# Patient Record
Sex: FEMALE | Race: WHITE | NOT HISPANIC OR LATINO | Employment: OTHER | URBAN - METROPOLITAN AREA
[De-identification: names, ages, dates, MRNs, and addresses within clinical notes are randomized per-mention and may not be internally consistent; named-entity substitution may affect disease eponyms.]

---

## 2017-01-03 ENCOUNTER — HOSPITAL ENCOUNTER (OUTPATIENT)
Dept: PHYSICAL THERAPY | Facility: CLINIC | Age: 57
Setting detail: THERAPIES SERIES
End: 2017-01-03
Attending: PSYCHIATRY & NEUROLOGY
Payer: COMMERCIAL

## 2017-01-03 PROCEDURE — 97110 THERAPEUTIC EXERCISES: CPT | Mod: GP | Performed by: PHYSICAL THERAPIST

## 2017-01-03 PROCEDURE — 40000718 ZZHC STATISTIC PT DEPARTMENT ORTHO VISIT: Performed by: PHYSICAL THERAPIST

## 2017-01-03 PROCEDURE — 97014 ELECTRIC STIMULATION THERAPY: CPT | Mod: GP | Performed by: PHYSICAL THERAPIST

## 2017-01-03 PROCEDURE — 97140 MANUAL THERAPY 1/> REGIONS: CPT | Mod: GP | Performed by: PHYSICAL THERAPIST

## 2017-01-06 ENCOUNTER — TELEPHONE (OUTPATIENT)
Dept: FAMILY MEDICINE | Facility: CLINIC | Age: 57
End: 2017-01-06
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
## 2017-01-06 NOTE — TELEPHONE ENCOUNTER
Discussed @ length being able to remember things and having a hard time saying what she wants to say.  Talked about stress and anxiety.  Problem with incontinence.  Scheduled appt next week to discuss incontinence.  Yanira

## 2017-01-10 ENCOUNTER — HOSPITAL ENCOUNTER (OUTPATIENT)
Dept: PHYSICAL THERAPY | Facility: CLINIC | Age: 57
Setting detail: THERAPIES SERIES
End: 2017-01-10
Attending: PSYCHIATRY & NEUROLOGY
Payer: COMMERCIAL

## 2017-01-10 PROCEDURE — 97110 THERAPEUTIC EXERCISES: CPT | Mod: GP | Performed by: PHYSICAL THERAPIST

## 2017-01-10 PROCEDURE — 40000718 ZZHC STATISTIC PT DEPARTMENT ORTHO VISIT: Performed by: PHYSICAL THERAPIST

## 2017-01-10 PROCEDURE — 97140 MANUAL THERAPY 1/> REGIONS: CPT | Mod: GP | Performed by: PHYSICAL THERAPIST

## 2017-01-17 ENCOUNTER — HOSPITAL ENCOUNTER (OUTPATIENT)
Dept: PHYSICAL THERAPY | Facility: CLINIC | Age: 57
Setting detail: THERAPIES SERIES
End: 2017-01-17
Attending: PSYCHIATRY & NEUROLOGY
Payer: COMMERCIAL

## 2017-01-17 PROCEDURE — 97140 MANUAL THERAPY 1/> REGIONS: CPT | Mod: GP | Performed by: PHYSICAL THERAPIST

## 2017-01-17 PROCEDURE — 40000718 ZZHC STATISTIC PT DEPARTMENT ORTHO VISIT: Performed by: PHYSICAL THERAPIST

## 2017-01-17 PROCEDURE — 97110 THERAPEUTIC EXERCISES: CPT | Mod: GP | Performed by: PHYSICAL THERAPIST

## 2017-01-25 ENCOUNTER — HOSPITAL ENCOUNTER (OUTPATIENT)
Dept: PHYSICAL THERAPY | Facility: CLINIC | Age: 57
Setting detail: THERAPIES SERIES
End: 2017-01-25
Attending: PSYCHIATRY & NEUROLOGY
Payer: COMMERCIAL

## 2017-01-25 ENCOUNTER — TELEPHONE (OUTPATIENT)
Dept: FAMILY MEDICINE | Facility: CLINIC | Age: 57
End: 2017-01-25

## 2017-01-25 PROCEDURE — 40000718 ZZHC STATISTIC PT DEPARTMENT ORTHO VISIT: Performed by: PHYSICAL THERAPIST

## 2017-01-25 PROCEDURE — 97140 MANUAL THERAPY 1/> REGIONS: CPT | Mod: GP | Performed by: PHYSICAL THERAPIST

## 2017-01-25 PROCEDURE — 97110 THERAPEUTIC EXERCISES: CPT | Mod: GP | Performed by: PHYSICAL THERAPIST

## 2017-01-25 NOTE — TELEPHONE ENCOUNTER
Reason for Call:  Form, our goal is to have forms completed with 72 hours, however, some forms may require a visit or additional information.    Type of letter, form or note:  disability    Who is the form from?:  (if other please explain)    Where did the form come from: form was faxed in    What clinic location was the form placed at?: [facility]    Where the form was placed: Form's Box    What number is listed as a contact on the form?: [phone]       Additional comments:     Call taken on 1/25/2017 at 10:18 AM by Catie Jerez

## 2017-01-25 NOTE — PROGRESS NOTES
Mihaela PEREYRA Pinky  1960  Diagnosis - Cervical Strain / Thoracic pain / Lumbar strain / Rib fracture / Cervicogenic headache Physical Therapy Progress Note  01/25/17 0900   Signing Clinician's Name / Credentials   Signing clinician's name / credentials Loyd Juarez, PT, DPT   Session Number   Session Number 5 (Start of Care Date - 12/27/2016)   Progress Note/Recertification   Progress Note Due Date 01/27/17   Progress Note Completed Date 01/25/17   Adult Goals   PT Ortho Eval Goals 1;2;3;4;5;6   Ortho Goal 1   Goal Description Pt will be independent in HEP in order to achieve long term treatment goals.  (Mostly met.)   Target Date 01/10/17   Ortho Goal 2   Goal Description Pt will be able to take out garbage with minimal increase in back pain 1-2/10.   (Not met)   Target Date 02/21/17   Ortho Goal 3   Goal Description Pt will be able to do dishes with minimal increase in back pain 1-2/10.  (Not met)   Target Date 02/21/17   Ortho Goal 4   Goal Description Pt will be able to vacuum with minimal increase in low back pain 1-2/10.  (Not met)   Target Date 02/21/17   Ortho Goal 5   Goal Description Pt will reduce occurrence of headahces by 50%.  (Not met)   Target Date 02/21/17   Ortho Goal 6   Goal Description Pt will be able to sleep through the night without having to take medication to fall asleep.  (Not met)   Target Date 02/21/17   Subjective Report   Subjective Report Seems like can walk a little better lately. 5/10 pain currently. 25% improvement since beginning physcial therapy.   Objective Measures   Objective Measures Objective Measure 1;Objective Measure 2;Objective Measure 3;Objective Measure 4;Objective Measure 5   Objective Measure 3   Objective Measure Palpation   Details Hypertonicity of cervical paraspinals, thoracic paraspinals, and lumbar paraspinals. Also hypertonicity around PSIS.    Objective Measure 4   Objective Measure Transfers   Details Difficulty with sit to supine and supine to sit  transfers. Able to perform independently.   Objective Measure 5   Objective Measure Gait   Details Shuffling and deliberate gait at today's visit.   Treatment Interventions   Interventions Therapeutic Procedure/Exercise;Manual Therapy   Therapeutic Procedure/exercise   Minutes 11   Skilled Intervention Stretching and strengthening exercises    Patient Response No increase in discomfort noted during treatment session.   Treatment Detail Chin tucks x5 with 3 second holds / Supine knee push x15 B with 3 second holds / Bridges x10 / Reverse crunches x15 /    Manual Therapy   Minutes 31   Skilled Intervention STM / Joint mobilizations    Patient Response Decrease in tone and improved motion following treatment. Mild dizziness upon sitting up which subsided after 30 seconds.   Treatment Detail Suboccipital release to reduce tone and pain / STM to cervical and thoracic paraspinals and UT to reduce tone and discomfort / Mid-cervical downslides grades I-III at C3-C6 to reduce pain and improve motion / Thoracic mobilizations grades I-III to reduce pain and improve motion / Gentle manual cervical spine distraction to improve motion and reduce pain   Education   Learner Patient   Readiness Acceptance   Method Booklet/handout;Explanation;Demonstration   Response Verbalizes Understanding;Demonstrates Understanding   Education Comments VHI handout   Plan   Home program Lumbar rotations / Piriformis stretch / UT stretch 2x30 seconds with hand holding side of chair / LS Stretch / Standing marching x10 B /    Plan for next session MT as tolerated. Strengthening and stretching exercises as tolerated.   Comments   Comments Pt has made some progress towards goals but has not met them currently. Pt states that she has seen roughly a 25% improvement since beginning physical therapy. Pt would benefit from continued skilled PT in order to improve overall strength and functional ability.    Total Session Time   Total Session Time 42 (1TE  2MT)     Referring Physician - Harley Llamas

## 2017-01-31 ENCOUNTER — HOSPITAL ENCOUNTER (OUTPATIENT)
Dept: PHYSICAL THERAPY | Facility: CLINIC | Age: 57
Setting detail: THERAPIES SERIES
End: 2017-01-31
Attending: PSYCHIATRY & NEUROLOGY
Payer: COMMERCIAL

## 2017-01-31 PROCEDURE — 97140 MANUAL THERAPY 1/> REGIONS: CPT | Mod: GP | Performed by: PHYSICAL THERAPIST

## 2017-01-31 PROCEDURE — 97110 THERAPEUTIC EXERCISES: CPT | Mod: GP | Performed by: PHYSICAL THERAPIST

## 2017-01-31 PROCEDURE — 40000718 ZZHC STATISTIC PT DEPARTMENT ORTHO VISIT: Performed by: PHYSICAL THERAPIST

## 2017-02-01 ENCOUNTER — OFFICE VISIT (OUTPATIENT)
Dept: FAMILY MEDICINE | Facility: CLINIC | Age: 57
End: 2017-02-01
Payer: COMMERCIAL

## 2017-02-01 ENCOUNTER — MEDICAL CORRESPONDENCE (OUTPATIENT)
Dept: HEALTH INFORMATION MANAGEMENT | Facility: CLINIC | Age: 57
End: 2017-02-01

## 2017-02-01 VITALS
SYSTOLIC BLOOD PRESSURE: 120 MMHG | BODY MASS INDEX: 26.84 KG/M2 | HEIGHT: 67 IN | OXYGEN SATURATION: 98 % | WEIGHT: 171 LBS | TEMPERATURE: 97.2 F | DIASTOLIC BLOOD PRESSURE: 80 MMHG | HEART RATE: 78 BPM

## 2017-02-01 DIAGNOSIS — Z23 NEED FOR PROPHYLACTIC VACCINATION AND INOCULATION AGAINST INFLUENZA: Primary | ICD-10-CM

## 2017-02-01 PROCEDURE — 90686 IIV4 VACC NO PRSV 0.5 ML IM: CPT | Performed by: FAMILY MEDICINE

## 2017-02-01 PROCEDURE — 90471 IMMUNIZATION ADMIN: CPT | Performed by: FAMILY MEDICINE

## 2017-02-01 PROCEDURE — 99212 OFFICE O/P EST SF 10 MIN: CPT | Mod: 25 | Performed by: FAMILY MEDICINE

## 2017-02-01 ASSESSMENT — PAIN SCALES - GENERAL: PAINLEVEL: SEVERE PAIN (7)

## 2017-02-01 NOTE — PROGRESS NOTES
SUBJECTIVE:                                                    Mihaela Vance is a 56 year old female who presents to clinic today for the following health issues:    She is having trouble walking. She does not walk much outside because she is afraid of falling. She cannot stand or walk for prolonged periods.  She cannot sit for prolonged periods.  She is not able to function mentally in any job.  She has a history of degenerative disc disease of the lumbar spine.  She has a history of anxiety and panic attacks.      Needs forms for disability        Problem list and histories reviewed & adjusted, as indicated.  Additional history: as documented    Medical, surgical, family, social histories, allergies and meds reviewed and updated.    ROS:  General: No change in weight, sleep or appetite.  Normal energy.  No fever or chills  Resp: No coughing, wheezing or shortness of breath  CV: No chest pains or palpitations  GI: No nausea, vomiting,  heartburn, abdominal pain, diarrhea, constipation or change in bowel habits    Exam:  GENERAL APPEARANCE ADULT: Alert, no acute distress  NECK: No adenopathy,masses or thyromegaly  RESP: lungs clear to auscultation   CV: normal rate, regular rhythm, no murmur or gallop    ASSESSMENT:  (Z23) Need for prophylactic vaccination and inoculation against influenza  (primary encounter diagnosis)  Comment:   Plan: FLU VAC, SPLIT VIRUS IM > 3 YO (QUADRIVALENT)         [65581], Vaccine Administration, Initial         [56907]              PLAN:  Orders Placed This Encounter     FLU VAC, SPLIT VIRUS IM > 3 YO (QUADRIVALENT) [60712]     Vaccine Administration, Initial [37786]   Recheck in clinic as needed.      There are no Patient Instructions on file for this visit.      Ivan Wu               Injectable Influenza Immunization Documentation    1.  Is the person to be vaccinated sick today?  No    2. Does the person to be vaccinated have an allergy to eggs or to a component of the  vaccine?  No    3. Has the person to be vaccinated today ever had a serious reaction to influenza vaccine in the past?  No    4. Has the person to be vaccinated ever had Guillain-[place] syndrome?  No     Form completed by Meghan MCKEON

## 2017-02-01 NOTE — NURSING NOTE
"Chief Complaint   Patient presents with     Forms     for disability       Initial /92 mmHg  Pulse 78  Temp(Src) 97.2  F (36.2  C) (Tympanic)  Ht 5' 6.75\" (1.695 m)  Wt 171 lb (77.565 kg)  BMI 27.00 kg/m2  SpO2 98%  LMP 09/16/2006  Breastfeeding? No Estimated body mass index is 27 kg/(m^2) as calculated from the following:    Height as of this encounter: 5' 6.75\" (1.695 m).    Weight as of this encounter: 171 lb (77.565 kg).  BP completed using cuff size: ludmila MCKEON      "

## 2017-02-01 NOTE — MR AVS SNAPSHOT
"              After Visit Summary   2/1/2017    Mihaela Vance    MRN: [id number]           Patient Information     Date Of Birth          1960        Visit Information        Provider Department      2/1/2017 10:20 AM Ivan Wu MD [facility]        Today's Diagnoses     Need for prophylactic vaccination and inoculation against influenza    -  1        Follow-ups after your visit        Your next 10 appointments already scheduled     Feb 09, 2017  9:00 AM   Treatment with Rommel Juarez PT   [facility] ([facility])    [address]  [facility] [address]   [phone]              Who to contact     If you have questions or need follow up information about today's clinic visit or your schedule please contact [facility] directly at [phone].  Normal or non-critical lab and imaging results will be communicated to you by MyChart, letter or phone within 4 business days after the clinic has received the results. If you do not hear from us within 7 days, please contact the clinic through MyChart or phone. If you have a critical or abnormal lab result, we will notify you by phone as soon as possible.  Submit refill requests through Jacked or call your pharmacy and they will forward the refill request to us. Please allow 3 business days for your refill to be completed.          Additional Information About Your Visit        MyChart Information     Jacked lets you send messages to your doctor, view your test results, renew your prescriptions, schedule appointments and more. To sign up, go to [email or web address] . Click on \"Log in\" on the left side of the screen, which will take you to the Welcome page. Then click on \"Sign up Now\" on the right side of the page.     You will be asked to enter the access code listed below, as well as some personal information. Please follow the directions to create your " "username and password.     Your access code is: [email or web address]  Expires: 2017 11:23 AM     Your access code will  in 90 days. If you need help or a new code, please call your [facility] or [phone].        Care EveryWhere ID     This is your Care EveryWhere ID. This could be used by other organizations to access your [facility] medical records  [id number]        Your Vitals Were     Pulse Temperature Height    78 97.2  F (36.2  C) (Tympanic) 5' 6.75\" (1.695 m)    BMI (Body Mass Index) Pulse Oximetry Last Period    27.00 kg/m2 98% 2006    Breastfeeding?          No         Blood Pressure from Last 3 Encounters:   17 120/80   16 117/86   06/24/15 123/83    Weight from Last 3 Encounters:   17 171 lb (77.565 kg)   16 171 lb (77.565 kg)   06/24/15 146 lb (66.225 kg)              We Performed the Following     FLU VAC, SPLIT VIRUS IM > 3 YO (QUADRIVALENT) [13046]     Vaccine Administration, Initial [35217]        Primary Care Provider Office Phone # Fax #    Ivan Wu [phone]       [facility] [address] [facility] [address]        Thank you!     Thank you for choosing [facility]  for your care. Our goal is always to provide you with excellent care. Hearing back from our patients is one way we can continue to improve our services. Please take a few minutes to complete the written survey that you may receive in the mail after your visit with us. Thank you!             Your Updated Medication List - Protect others around you: Learn how to safely use, store and throw away your medicines at www.disposemymeds.org.          This list is accurate as of: 17 11:23 AM.  Always use your most recent med list.                   Brand Name Dispense Instructions for use    amLODIPine 5 MG tablet    NORVASC    90 tablet    Take 1 tablet (5 mg) by mouth daily       atenolol 50 MG tablet    TENORMIN    45 tablet    " Take 0.5 tablets (25 mg) by mouth daily       B COMPLEX PO      Take 1 tablet by mouth daily.       BusPIRone HCl 30 MG Tabs     180 tablet    Take 1 capsule by mouth 2 times daily       CALCIUM + D PO      Take 2 tablets by mouth daily.       celecoxib 200 MG capsule    celeBREX    90 capsule    Take 1 capsule (200 mg) by mouth daily As needed       cyclobenzaprine 10 MG tablet    FLEXERIL    30 tablet    Take 1 tablet (10 mg) by mouth 3 times daily       Diclofenac Sodium 1 % Crea     60 g    Place 2 inches onto the skin daily       esomeprazole 20 MG CR capsule    nexIUM    90 capsule    Take 1 capsule (20 mg) by mouth every morning (before breakfast) Take 30-60 minutes before eating.       FISH OIL PO      Take 1 tablet by mouth daily.       FOLIC ACID PO      Take 400 mcg by mouth daily       furosemide 20 MG tablet    LASIX    90 tablet    Take 1 tablet (20 mg) by mouth daily       HYDROcodone-acetaminophen 5-500 MG per tablet    VICODIN    20 tablet    Take 1-2 tablets by mouth every 6 hours as needed for pain.       hydrOXYzine 25 MG tablet    ATARAX    100 tablet    Take 1 tablet (25 mg) by mouth nightly as needed for itching or other (sleep)       lidocaine 5 % Patch    LIDODERM    30 patch    Apply up to 3 patches to painful area at once for up to 12 h within a 24 h period.  Remove after 12 hours.       magnesium oxide 400 MG tablet    MAG-OX    60 tablet    Take 1 tablet (400 mg) by mouth daily       spironolactone 25 MG tablet    ALDACTONE     Take 50 mg by mouth daily       triamcinolone 0.1 % cream    KENALOG    80 g    Apply sparingly to affected area three times daily as needed       VITAMIN D (CHOLECALCIFEROL) PO      Take by mouth daily

## 2017-02-09 ENCOUNTER — HOSPITAL ENCOUNTER (OUTPATIENT)
Dept: PHYSICAL THERAPY | Facility: CLINIC | Age: 57
Setting detail: THERAPIES SERIES
End: 2017-02-09
Attending: PSYCHIATRY & NEUROLOGY
Payer: COMMERCIAL

## 2017-02-09 PROCEDURE — 97110 THERAPEUTIC EXERCISES: CPT | Mod: GP | Performed by: PHYSICAL THERAPIST

## 2017-02-09 PROCEDURE — 40000718 ZZHC STATISTIC PT DEPARTMENT ORTHO VISIT: Performed by: PHYSICAL THERAPIST

## 2017-02-09 PROCEDURE — 97140 MANUAL THERAPY 1/> REGIONS: CPT | Mod: GP | Performed by: PHYSICAL THERAPIST

## 2017-02-15 ENCOUNTER — HOSPITAL ENCOUNTER (OUTPATIENT)
Dept: PHYSICAL THERAPY | Facility: CLINIC | Age: 57
Setting detail: THERAPIES SERIES
End: 2017-02-15
Attending: PSYCHIATRY & NEUROLOGY
Payer: COMMERCIAL

## 2017-02-15 PROCEDURE — 97110 THERAPEUTIC EXERCISES: CPT | Mod: GP | Performed by: PHYSICAL THERAPIST

## 2017-02-15 PROCEDURE — 40000718 ZZHC STATISTIC PT DEPARTMENT ORTHO VISIT: Performed by: PHYSICAL THERAPIST

## 2017-02-21 ENCOUNTER — HOSPITAL ENCOUNTER (OUTPATIENT)
Dept: PHYSICAL THERAPY | Facility: CLINIC | Age: 57
Setting detail: THERAPIES SERIES
End: 2017-02-21
Attending: PSYCHIATRY & NEUROLOGY
Payer: COMMERCIAL

## 2017-02-21 PROCEDURE — 40000718 ZZHC STATISTIC PT DEPARTMENT ORTHO VISIT: Performed by: PHYSICAL THERAPIST

## 2017-02-21 PROCEDURE — 97110 THERAPEUTIC EXERCISES: CPT | Mod: GP | Performed by: PHYSICAL THERAPIST

## 2017-02-21 PROCEDURE — 97140 MANUAL THERAPY 1/> REGIONS: CPT | Mod: GP | Performed by: PHYSICAL THERAPIST

## 2017-02-21 NOTE — PROGRESS NOTES
"  Mihaela Vance  1960  Diagnosis - Cervical strain / Thoracic pain / Lumbar strain / Rib fracture / Cervicogenic headache Physical Therapy Discharge Note  02/21/17 0800   Signing Clinician's Name / Credentials   Signing clinician's name / credentials Loyd Juarez, PT, DPT   Session Number   Session Number 9 (Start of Care Date - 12/27/2016)   Progress Note/Recertification   Progress Note Due Date 02/25/17   Progress Note Completed Date 02/21/17   Adult Goals   PT Ortho Eval Goals 1;2;3;4;5;6   Ortho Goal 1   Goal Description Pt will be independent in HEP in order to achieve long term treatment goals.  (Not met)   Target Date 01/10/17   Ortho Goal 2   Goal Description Pt will be able to take out garbage with minimal increase in back pain 1-2/10.   (Not met)   Target Date 02/21/17   Ortho Goal 3   Goal Description Pt will be able to do dishes with minimal increase in back pain 1-2/10.  (Not met)   Target Date 02/21/17   Ortho Goal 4   Goal Description Pt will be able to vacuum with minimal increase in low back pain 1-2/10.  (Not met)   Target Date 02/21/17   Ortho Goal 5   Goal Description Pt will reduce occurrence of headahces by 50%.  (Not met. )   Target Date 02/21/17   Ortho Goal 6   Goal Description Pt will be able to sleep through the night without having to take medication to fall asleep.  (Not met)   Target Date 02/21/17   Subjective Report   Subjective Report \"It was horrible after last visit. Too many exercises last time. Took lots of medicine.\" HEP at home does not hurt. Has not been able to do exercises for a few days last week.   Objective Measures   Objective Measures Objective Measure 1;Objective Measure 2;Objective Measure 3;Objective Measure 4;Objective Measure 5   Objective Measure 1   Objective Measure Strength   Details Hip flexion - 4-/5 B / Knee extension - 5/5 B / Ankle DF - 5/5 B / Shoulder shrug - 5/5 B / Shoulder Abduction - 4/5 B / Shoulder ER - 4-/5 B / Elbow Flexion - 5/5 B / Elbow " Extension - 5/5 B / Wrist flexion - 5/5 B / Wrist extension - 5/5 B    Objective Measure 2   Objective Measure ABIMAEL   Details 53.33%   Objective Measure 3   Objective Measure Eliseo   Details High   Objective Measure 4   Objective Measure Reflexes    Details Patellar - 2+ B   Treatment Interventions   Interventions Therapeutic Procedure/Exercise;Manual Therapy   Therapeutic Procedure/exercise   Minutes 15   Skilled Intervention Stretching exercises   Patient Response Requires cueing for several exercises.   Treatment Detail Chair rotations x5B with 10 second holds / Bridges x15 / Reverse crunches x15 / Lumbar rotations x10 with 10 second holds / Piriformis stretch 2x30 seconds / UT stretch 2x30 seconds with hand holding side of chair 4x30 seconds / LS Stretch 4x30 seconds / Standing marching x15 B / Mini Lunges x15 B     Manual Therapy   Minutes 25   Skilled Intervention STM and joint mobilizations   Patient Response Reduction in tone and improved motion   Treatment Detail STM to lumbar, thoracic, and cervical paraspinal musculature / STM to quadratus lumborm B / PA joint mobilizations grades I-III T4-L5 to improve motion and reduce pain / Sacral rocking to improve motion   Plan   Home program Chair rotations / Bridges / Reverse crunches / Lumbar rotations / Piriformis stretch / UT stretch 2x30 seconds with hand holding side of chair / LS Stretch / Standing marching x10 B / Mini Lunges    Plan for next session Discharged   Comments   Comments Pt states that she has seen some improvement since beginning physical therapy, however she has not met goals set up at time of initial evaluation. Pt's ABIMAEL score has increased and her Eliseo score remains high. Due to lack of progress in terms of functional ability, pt will be discharged to [facility]. Pt is in agreement with this plan.   Total Session Time   Total Session Time 40 (1TE 2MT)     Referring Physician - Harley Llamas

## 2017-07-21 ENCOUNTER — TELEPHONE (OUTPATIENT)
Dept: FAMILY MEDICINE | Facility: CLINIC | Age: 57
End: 2017-07-21

## 2017-07-21 ENCOUNTER — OFFICE VISIT (OUTPATIENT)
Dept: FAMILY MEDICINE | Facility: CLINIC | Age: 57
End: 2017-07-21
Payer: COMMERCIAL

## 2017-07-21 VITALS
SYSTOLIC BLOOD PRESSURE: 108 MMHG | TEMPERATURE: 98.4 F | OXYGEN SATURATION: 100 % | DIASTOLIC BLOOD PRESSURE: 82 MMHG | HEART RATE: 103 BPM | BODY MASS INDEX: 24.46 KG/M2 | WEIGHT: 155 LBS

## 2017-07-21 DIAGNOSIS — M54.5 CHRONIC LOW BACK PAIN, UNSPECIFIED BACK PAIN LATERALITY, WITH SCIATICA PRESENCE UNSPECIFIED: ICD-10-CM

## 2017-07-21 DIAGNOSIS — I10 HYPERTENSION, GOAL BELOW 140/90: Primary | ICD-10-CM

## 2017-07-21 DIAGNOSIS — K21.9 GASTROESOPHAGEAL REFLUX DISEASE WITHOUT ESOPHAGITIS: ICD-10-CM

## 2017-07-21 DIAGNOSIS — R52 GENERALIZED PAIN: ICD-10-CM

## 2017-07-21 DIAGNOSIS — M25.50 ARTHRALGIA, UNSPECIFIED JOINT: ICD-10-CM

## 2017-07-21 DIAGNOSIS — Z13.6 CARDIOVASCULAR SCREENING; LDL GOAL LESS THAN 160: ICD-10-CM

## 2017-07-21 DIAGNOSIS — I10 ESSENTIAL HYPERTENSION WITH GOAL BLOOD PRESSURE LESS THAN 140/90: ICD-10-CM

## 2017-07-21 DIAGNOSIS — M51.369 DDD (DEGENERATIVE DISC DISEASE), LUMBAR: Primary | ICD-10-CM

## 2017-07-21 DIAGNOSIS — Z11.59 NEED FOR HEPATITIS C SCREENING TEST: ICD-10-CM

## 2017-07-21 DIAGNOSIS — Z12.4 SCREENING FOR CERVICAL CANCER: ICD-10-CM

## 2017-07-21 DIAGNOSIS — G89.29 CHRONIC LOW BACK PAIN, UNSPECIFIED BACK PAIN LATERALITY, WITH SCIATICA PRESENCE UNSPECIFIED: ICD-10-CM

## 2017-07-21 DIAGNOSIS — Z00.00 ROUTINE GENERAL MEDICAL EXAMINATION AT A HEALTH CARE FACILITY: ICD-10-CM

## 2017-07-21 DIAGNOSIS — L29.9 ITCHING: ICD-10-CM

## 2017-07-21 DIAGNOSIS — F41.9 ANXIETY: ICD-10-CM

## 2017-07-21 LAB
ALBUMIN SERPL-MCNC: 3.9 G/DL (ref 3.4–5)
ALP SERPL-CCNC: 191 U/L (ref 40–150)
ALT SERPL W P-5'-P-CCNC: 58 U/L (ref 0–50)
ANION GAP SERPL CALCULATED.3IONS-SCNC: 11 MMOL/L (ref 3–14)
AST SERPL W P-5'-P-CCNC: 109 U/L (ref 0–45)
BILIRUB SERPL-MCNC: 1.5 MG/DL (ref 0.2–1.3)
BUN SERPL-MCNC: 23 MG/DL (ref 7–30)
CALCIUM SERPL-MCNC: 9.5 MG/DL (ref 8.5–10.1)
CHLORIDE SERPL-SCNC: 88 MMOL/L (ref 94–109)
CO2 SERPL-SCNC: 26 MMOL/L (ref 20–32)
CREAT SERPL-MCNC: 1.47 MG/DL (ref 0.52–1.04)
GFR SERPL CREATININE-BSD FRML MDRD: 36 ML/MIN/1.7M2
GLUCOSE SERPL-MCNC: 97 MG/DL (ref 70–99)
LDLC SERPL DIRECT ASSAY-MCNC: 157 MG/DL
POTASSIUM SERPL-SCNC: 3.5 MMOL/L (ref 3.4–5.3)
PROT SERPL-MCNC: 8.7 G/DL (ref 6.8–8.8)
SODIUM SERPL-SCNC: 125 MMOL/L (ref 133–144)

## 2017-07-21 PROCEDURE — G0476 HPV COMBO ASSAY CA SCREEN: HCPCS | Performed by: FAMILY MEDICINE

## 2017-07-21 PROCEDURE — 99396 PREV VISIT EST AGE 40-64: CPT | Performed by: FAMILY MEDICINE

## 2017-07-21 PROCEDURE — 36415 COLL VENOUS BLD VENIPUNCTURE: CPT | Performed by: FAMILY MEDICINE

## 2017-07-21 PROCEDURE — G0472 HEP C SCREEN HIGH RISK/OTHER: HCPCS | Performed by: FAMILY MEDICINE

## 2017-07-21 PROCEDURE — G0145 SCR C/V CYTO,THINLAYER,RESCR: HCPCS | Performed by: FAMILY MEDICINE

## 2017-07-21 PROCEDURE — 80053 COMPREHEN METABOLIC PANEL: CPT | Performed by: FAMILY MEDICINE

## 2017-07-21 PROCEDURE — 83721 ASSAY OF BLOOD LIPOPROTEIN: CPT | Performed by: FAMILY MEDICINE

## 2017-07-21 RX ORDER — FUROSEMIDE 20 MG
20 TABLET ORAL DAILY
Qty: 90 TABLET | Refills: 3 | Status: SHIPPED | OUTPATIENT
Start: 2017-07-21 | End: 2019-03-26

## 2017-07-21 RX ORDER — CELECOXIB 200 MG/1
200 CAPSULE ORAL DAILY
Qty: 90 CAPSULE | Refills: 3 | Status: ON HOLD | OUTPATIENT
Start: 2017-07-21 | End: 2019-04-25

## 2017-07-21 RX ORDER — LIDOCAINE 50 MG/G
PATCH TOPICAL
Qty: 30 PATCH | Refills: 11 | Status: SHIPPED | OUTPATIENT
Start: 2017-07-21 | End: 2018-08-21

## 2017-07-21 RX ORDER — HYDROXYZINE HYDROCHLORIDE 25 MG/1
25 TABLET, FILM COATED ORAL
Qty: 100 TABLET | Refills: 3 | Status: SHIPPED | OUTPATIENT
Start: 2017-07-21 | End: 2018-08-21

## 2017-07-21 RX ORDER — AMLODIPINE BESYLATE 5 MG/1
5 TABLET ORAL DAILY
Qty: 90 TABLET | Refills: 3 | Status: SHIPPED | OUTPATIENT
Start: 2017-07-21 | End: 2018-08-21

## 2017-07-21 RX ORDER — BUSPIRONE HYDROCHLORIDE 30 MG/1
1 TABLET ORAL 2 TIMES DAILY
Qty: 180 TABLET | Refills: 3 | Status: SHIPPED | OUTPATIENT
Start: 2017-07-21 | End: 2018-08-21

## 2017-07-21 RX ORDER — ATENOLOL 50 MG/1
25 TABLET ORAL DAILY
Qty: 45 TABLET | Refills: 3 | Status: SHIPPED | OUTPATIENT
Start: 2017-07-21 | End: 2018-08-21

## 2017-07-21 ASSESSMENT — PAIN SCALES - GENERAL: PAINLEVEL: SEVERE PAIN (6)

## 2017-07-21 NOTE — TELEPHONE ENCOUNTER
[facility] calling, stating Diclofenac Sodium 1 % does not come in a cream, gel only and directions must state number of grams to apply, please send new rx, thank you!

## 2017-07-21 NOTE — MR AVS SNAPSHOT
After Visit Summary   7/21/2017    Mihaela Vance    MRN: [id number]           Patient Information     Date Of Birth          1960        Visit Information        Provider Department      7/21/2017 9:20 AM Ivan Wu MD [facility]        Today's Diagnoses     Hypertension, goal below 140/90    -  1    Generalized pain        Gastroesophageal reflux disease without esophagitis        Essential hypertension with goal blood pressure less than 140/90        Anxiety        Chronic low back pain, unspecified back pain laterality, with sciatica presence unspecified        Arthralgia, unspecified joint        Itching        CARDIOVASCULAR SCREENING; LDL GOAL LESS THAN 160        Routine general medical examination at a health care facility        Screening for cervical cancer        Need for hepatitis C screening test          Care Instructions      Preventive Health Recommendations  Female Ages 50 - 64    Yearly exam: See your health care provider every year in order to  o Review health changes.   o Discuss preventive care.    o Review your medicines if your doctor has prescribed any.      Get a Pap test every three years (unless you have an abnormal result and your provider advises testing more often).    If you get Pap tests with HPV test, you only need to test every 5 years, unless you have an abnormal result.     You do not need a Pap test if your uterus was removed (hysterectomy) and you have not had cancer.    You should be tested each year for STDs (sexually transmitted diseases) if you're at risk.     Have a mammogram every 1 to 2 years.    Have a colonoscopy at age 50, or have a yearly FIT test (stool test). These exams screen for colon cancer.      Have a cholesterol test every 5 years, or more often if advised.    Have a diabetes test (fasting glucose) every three years. If you are at risk for diabetes, you should have this test more often.     If you are at  "risk for osteoporosis (brittle bone disease), think about having a bone density scan (DEXA).    Shots: Get a flu shot each year. Get a tetanus shot every 10 years.    Nutrition:     Eat at least 5 servings of fruits and vegetables each day.    Eat whole-grain bread, whole-wheat pasta and brown rice instead of white grains and rice.    Talk to your provider about Calcium and Vitamin D.     Lifestyle    Exercise at least 150 minutes a week (30 minutes a day, 5 days a week). This will help you control your weight and prevent disease.    Limit alcohol to one drink per day.    No smoking.     Wear sunscreen to prevent skin cancer.     See your dentist every six months for an exam and cleaning.    See your eye doctor every 1 to 2 years.            Follow-ups after your visit        Who to contact     If you have questions or need follow up information about today's clinic visit or your schedule please contact [facility] directly at [phone].  Normal or non-critical lab and imaging results will be communicated to you by [employer], letter or phone within 4 business days after the clinic has received the results. If you do not hear from us within 7 days, please contact the clinic through [employer] or phone. If you have a critical or abnormal lab result, we will notify you by phone as soon as possible.  Submit refill requests through Cannon[employer] or call your pharmacy and they will forward the refill request to us. Please allow 3 business days for your refill to be completed.          Additional Information About Your Visit        Cannonball Information     Cannonball lets you send messages to your doctor, view your test results, renew your prescriptions, schedule appointments and more. To sign up, go to [email or web address] . Click on \"Log in\" on the left side of the screen, which will take you to the Welcome page. Then click on \"Sign up Now\" on the right side of the page.     You will be asked to enter the " access code listed below, as well as some personal information. Please follow the directions to create your username and password.     Your access code is: [email or web address]  Expires: 10/19/2017 10:00 AM     Your access code will  in 90 days. If you need help or a new code, please call your [facility] clinic or [phone].        Care EveryWhere ID     This is your Care EveryWhere ID. This could be used by other organizations to access your [facility] medical records  [id number]        Your Vitals Were     Pulse Temperature Last Period Pulse Oximetry Breastfeeding? BMI (Body Mass Index)    103 98.4  F (36.9  C) (Tympanic) 2006 100% No 24.46 kg/m2       Blood Pressure from Last 3 Encounters:   17 108/82   17 120/80   16 117/86    Weight from Last 3 Encounters:   17 155 lb (70.3 kg)   17 171 lb (77.6 kg)   16 171 lb (77.6 kg)              We Performed the Following     **Hepatitis C Screen Reflex to RNA FUTURE anytime     Comprehensive metabolic panel (BMP + Alb, Alk Phos, ALT, AST, Total. Bili, TP)     LDL cholesterol direct     Pap imaged thin layer screen with HPV - recommended age 30 - 65 years (select HPV order below)          Where to get your medicines      These medications were sent to [facility] - [facility]DERS - [address]  [address], [place] [address]    Hours:  MICKIE Saunders [facility] Phone:  [phone]     amLODIPine 5 MG tablet    atenolol 50 MG tablet    BusPIRone HCl 30 MG Tabs    celecoxib 200 MG capsule    Diclofenac Sodium 1 % Crea    esomeprazole 20 MG CR capsule    furosemide 20 MG tablet    hydrOXYzine 25 MG tablet    lidocaine 5 % Patch          Primary Care Provider Office Phone # Fax #    Ivan Kumar Wu [phone]       [facility] [address] [facility] [address]        Equal Access to Services     JOSE PRASAD AH: natan Szymanski,  dagoberto humphreys ah. So [facility] [phone].    ATENCIÓN: Si ruma cortés, tiene a guallpa disposición servicios gratuitos de asistencia lingüística. Haley al [phone].    We comply with applicable federal civil rights laws and [place] laws. We do not discriminate on the basis of race, color, national origin, age, disability sex, sexual orientation or gender identity.            Thank you!     Thank you for choosing [facility]  for your care. Our goal is always to provide you with excellent care. Hearing back from our patients is one way we can continue to improve our services. Please take a few minutes to complete the written survey that you may receive in the mail after your visit with us. Thank you!             Your Updated Medication List - Protect others around you: Learn how to safely use, store and throw away your medicines at www.disposemymeds.org.          This list is accurate as of: 7/21/17 10:01 AM.  Always use your most recent med list.                   Brand Name Dispense Instructions for use Diagnosis    amLODIPine 5 MG tablet    NORVASC    90 tablet    Take 1 tablet (5 mg) by mouth daily    Essential hypertension with goal blood pressure less than 140/90       atenolol 50 MG tablet    TENORMIN    45 tablet    Take 0.5 tablets (25 mg) by mouth daily    Essential hypertension with goal blood pressure less than 140/90       B COMPLEX PO      Take 1 tablet by mouth daily.    Generalized anxiety disorder, Panic disorder without agoraphobia       BusPIRone HCl 30 MG Tabs     180 tablet    Take 1 capsule by mouth 2 times daily    Anxiety       CALCIUM + D PO      Take 2 tablets by mouth daily.    Generalized anxiety disorder, Panic disorder without agoraphobia       celecoxib 200 MG capsule    celeBREX    90 capsule    Take 1 capsule (200 mg) by mouth daily As needed    Generalized pain       cyclobenzaprine 10 MG tablet    FLEXERIL    30  tablet    Take 1 tablet (10 mg) by mouth 3 times daily    Other unspecified back disorder       Diclofenac Sodium 1 % Crea     60 g    Place 2 inches onto the skin daily    Arthralgia, unspecified joint       esomeprazole 20 MG CR capsule    nexIUM    90 capsule    Take 1 capsule (20 mg) by mouth every morning (before breakfast) Take 30-60 minutes before eating.    Gastroesophageal reflux disease without esophagitis       FISH OIL PO      Take 1 tablet by mouth daily.    Generalized anxiety disorder, Panic disorder without agoraphobia       FOLIC ACID PO      Take 400 mcg by mouth daily        furosemide 20 MG tablet    LASIX    90 tablet    Take 1 tablet (20 mg) by mouth daily    Essential hypertension with goal blood pressure less than 140/90       hydrOXYzine 25 MG tablet    ATARAX    100 tablet    Take 1 tablet (25 mg) by mouth nightly as needed for itching or other (sleep)    Itching       lidocaine 5 % Patch    LIDODERM    30 patch    Apply up to 3 patches to painful area at once for up to 12 h within a 24 h period.  Remove after 12 hours.    Chronic low back pain, unspecified back pain laterality, with sciatica presence unspecified       magnesium oxide 400 MG tablet    MAG-OX    60 tablet    Take 1 tablet (400 mg) by mouth daily    Anemia       spironolactone 25 MG tablet    ALDACTONE     Take 50 mg by mouth daily        VITAMIN D (CHOLECALCIFEROL) PO      Take by mouth daily

## 2017-07-21 NOTE — LETTER
July 31, 2017    Mihaela Vance  [address] [address] [address]    Dear Mihaela,  We are happy to inform you that your PAP smear result from 7/21/17 is normal.  We are now able to do a follow up test on PAP smears. The DNA test is for HPV (Human Papilloma Virus). Cervical cancer is closely linked with certain types of HPV. Your result showed no evidence of high risk HPV.  Therefore we recommend you return in 3 years for your next pap smear and HPV test.  You will still need to return to the clinic every year for an annual exam and other preventive tests.  Please contact the clinic at [phone] with any questions.  Sincerely,    Ivan Wu MD/hao

## 2017-07-21 NOTE — PROGRESS NOTES
SUBJECTIVE:   CC: Mihaela Vance is an 57 year old woman who presents for preventive health visit.     She has a  and is trying to get Social Security disability due to her severe anxiety.  She is wearing to see a  in a hearing. She will probably have to wait 8 more months.    Her back has been flaring up. She has tried chiropractor and physical therapy.  I recommended trying seeing pain specialist,  [facility].    Healthy Habits:    Do you get at least three servings of calcium containing foods daily (dairy, green leafy vegetables, etc.)? yes    Amount of exercise or daily activities, outside of work: 2 day(s) per week    Problems taking medications regularly No    Medication side effects: No    Have you had an eye exam in the past two years? yes    Do you see a dentist twice per year? no    Do you have sleep apnea, excessive snoring or daytime drowsiness?no              Today's PHQ-2 Score: PHQ-2 ( 1999 [employer]) 7/21/2017 6/24/2016   Q1: Little interest or pleasure in doing things 0 0   Q2: Feeling down, depressed or hopeless 0 0   PHQ-2 Score 0 0         Abuse: Current or Past(Physical, Sexual or Emotional)- No  Do you feel safe in your environment - Yes  Social History   Substance Use Topics     Smoking status: Never Smoker     Smokeless tobacco: Never Used     Alcohol use Yes      Comment: 2 days per week 3-4 drinks      The patient does not drink >3 drinks per day nor >7 drinks per week.    Reviewed orders with patient.  Reviewed health maintenance and updated orders accordingly -           Pertinent mammograms are reviewed under the imaging tab.  History of abnormal Pap smear:     Reviewed and updated as needed this visit by clinical staff  Allergies         Reviewed and updated as needed this visit by Provider            ROS:  C: NEGATIVE for fever, chills, change in weight  I: NEGATIVE for worrisome rashes, moles or lesions  E: NEGATIVE for vision changes or  irritation  ENT: NEGATIVE for ear, mouth and throat problems  R: NEGATIVE for significant cough or SOB  B: NEGATIVE for masses, tenderness or discharge  CV: NEGATIVE for chest pain, palpitations or peripheral edema  GI: NEGATIVE for nausea, abdominal pain, heartburn, or change in bowel habits  : NEGATIVE for unusual urinary or vaginal symptoms. No vaginal bleeding.  M: NEGATIVE for significant arthralgias or myalgia  N: NEGATIVE for weakness, dizziness or paresthesias  P: NEGATIVE for changes in mood or affect     OBJECTIVE:   LMP 09/16/2006  EXAM:  GENERAL APPEARANCE: healthy, alert and no distress  EYES: Eyes grossly normal to inspection, PERRL and conjunctivae and sclerae normal  HENT: ear canals and TM's normal, nose and mouth without ulcers or lesions, oropharynx clear and oral mucous membranes moist  NECK: no adenopathy, no asymmetry, masses, or scars and thyroid normal to palpation  RESP: lungs clear to auscultation - no rales, rhonchi or wheezes  BREAST: normal without masses, tenderness or nipple discharge and no palpable axillary masses or adenopathy  CV: regular rate and rhythm, normal S1 S2, no S3 or S4, no murmur, click or rub, no peripheral edema and peripheral pulses strong  ABDOMEN: soft, nontender, no hepatosplenomegaly, no masses and bowel sounds normal   (female): normal female external genitalia, normal urethral meatus, vaginal mucosal atrophy noted, normal cervix, adnexae, and uterus without masses or abnormal discharge  MS: no musculoskeletal defects are noted and gait is age appropriate without ataxia  SKIN: no suspicious lesions or rashes  NEURO: Normal strength and tone, sensory exam grossly normal, mentation intact and speech normal  PSYCH: mentation appears normal and affect normal/bright    ASSESSMENT/PLAN:       ICD-10-CM    1. Hypertension, goal below 140/90 I10 Comprehensive metabolic panel (BMP + Alb, Alk Phos, ALT, AST, Total. Bili, TP)   2. Generalized pain R52 celecoxib  "(CELEBREX) 200 MG capsule   3. Gastroesophageal reflux disease without esophagitis K21.9 esomeprazole (NEXIUM) 20 MG CR capsule   4. Essential hypertension with goal blood pressure less than 140/90 I10 furosemide (LASIX) 20 MG tablet     atenolol (TENORMIN) 50 MG tablet     amLODIPine (NORVASC) 5 MG tablet   5. Anxiety F41.9 BusPIRone HCl 30 MG TABS   6. Chronic low back pain, unspecified back pain laterality, with sciatica presence unspecified M54.5 lidocaine (LIDODERM) 5 % Patch    G89.29    7. Arthralgia, unspecified joint M25.50 Diclofenac Sodium 1 % CREA   8. Itching L29.9 hydrOXYzine (ATARAX) 25 MG tablet   9. CARDIOVASCULAR SCREENING; LDL GOAL LESS THAN 160 Z13.6 LDL cholesterol direct   10. Routine general medical examination at a health care facility Z00.00    11. Screening for cervical cancer Z12.4 Pap imaged thin layer screen with HPV - recommended age 30 - 65 years (select HPV order below)   12. Need for hepatitis C screening test Z11.59 **Hepatitis C Screen Reflex to RNA FUTURE anytime       COUNSELING:   Reviewed preventive health counseling, as reflected in patient instructions       Regular exercise       Healthy diet/nutrition       reports that she has never smoked. She has never used smokeless tobacco.    Estimated body mass index is 26.98 kg/(m^2) as calculated from the following:    Height as of 2/1/17: 5' 6.75\" (1.695 m).    Weight as of 2/1/17: 171 lb (77.6 kg).   Weight management plan: Discussed healthy diet and exercise guidelines and patient will follow up in 12 months in clinic to re-evaluate.    Counseling Resources:  ATP IV Guidelines  Pooled Cohorts Equation Calculator  Breast Cancer Risk Calculator  FRAX Risk Assessment  ICSI Preventive Guidelines  Dietary Guidelines for Americans, 2010  USDA's MyPlate  ASA Prophylaxis  Lung CA Screening    Ivan Wu MD  [facility]  "

## 2017-07-21 NOTE — TELEPHONE ENCOUNTER
PA for lidoderm patch completed at [facility] and faxed to [employer] - will await response    Id number [id number]  [phone]

## 2017-07-21 NOTE — NURSING NOTE
"Chief Complaint   Patient presents with     Physical       Initial /82 (BP Location: Right arm, Patient Position: Chair, Cuff Size: Adult Regular)  Pulse 103  Temp 98.4  F (36.9  C) (Tympanic)  Wt 155 lb (70.3 kg)  LMP 09/16/2006  SpO2 100%  Breastfeeding? No  BMI 24.46 kg/m2 Estimated body mass index is 24.46 kg/(m^2) as calculated from the following:    Height as of 2/1/17: 5' 6.75\" (1.695 m).    Weight as of this encounter: 155 lb (70.3 kg).  Medication Reconciliation: complete   Meghan Urrutia CMA       "

## 2017-07-21 NOTE — TELEPHONE ENCOUNTER
Please see message below.  Order pended but please state how my grams to the skin to apply.    Thank you  Octavia CAMPBELL RN

## 2017-07-24 LAB — HCV AB SERPL QL IA: NORMAL

## 2017-07-25 ENCOUNTER — TELEPHONE (OUTPATIENT)
Dept: FAMILY MEDICINE | Facility: CLINIC | Age: 57
End: 2017-07-25

## 2017-07-25 LAB
COPATH REPORT: NORMAL
PAP: NORMAL

## 2017-07-25 NOTE — TELEPHONE ENCOUNTER
Please call patient, her kidney function, GFR, has gone edvin from 59 1 year ago to 36. Please stop celebrex, spironolactone, and lasix.  Recheck BMP in 2 weeks.

## 2017-07-26 ENCOUNTER — TELEPHONE (OUTPATIENT)
Dept: FAMILY MEDICINE | Facility: CLINIC | Age: 57
End: 2017-07-26

## 2017-07-26 DIAGNOSIS — M51.369 DDD (DEGENERATIVE DISC DISEASE), LUMBAR: Primary | ICD-10-CM

## 2017-07-26 RX ORDER — CYCLOBENZAPRINE HCL 10 MG
10 TABLET ORAL 3 TIMES DAILY
Qty: 30 TABLET | Refills: 5 | Status: SHIPPED | OUTPATIENT
Start: 2017-07-26 | End: 2019-07-23

## 2017-07-26 NOTE — TELEPHONE ENCOUNTER
Flexeril refill.  Low back pain.  Has herniated discs that act up.  Has done ice,Ibuprofen and Tylenol.  Last written 3/11/15.  Last seen 7/21/17 for HTN.  Pt uses sparingly.  Advise.  Marcy

## 2017-07-26 NOTE — TELEPHONE ENCOUNTER
Please call patient, please stop ibuprofen also as this medicine can cause a decrease in kidney function also.

## 2017-07-26 NOTE — TELEPHONE ENCOUNTER
Reason for Call:  Other prescription    Detailed comments: Flexeril - Pt calling for refill - [facility]  Flexeril      Last Written Prescription Date: 03/11/15  Last Fill Quantity: 30,  # refills: 5   Last Office Visit with [facility], P or [facility] prescribing provider: 7/21/17                                             Phone Number Patient can be reached at: Home number on file [phone] (home)    Best Time: any    Can we leave a detailed message on this number? YES    Call taken on 7/26/2017 at 10:59 AM by Catie Jerez

## 2017-07-27 LAB
FINAL DIAGNOSIS: NORMAL
HPV HR 12 DNA CVX QL NAA+PROBE: NEGATIVE
HPV16 DNA SPEC QL NAA+PROBE: NEGATIVE
HPV18 DNA SPEC QL NAA+PROBE: NEGATIVE
SPECIMEN DESCRIPTION: NORMAL

## 2017-07-29 NOTE — TELEPHONE ENCOUNTER
PA for lidoderm patch has been denied. Patient does not have a qualifying diagnosis.  Pharmacy notified.

## 2017-08-02 ENCOUNTER — TELEPHONE (OUTPATIENT)
Dept: FAMILY MEDICINE | Facility: CLINIC | Age: 57
End: 2017-08-02

## 2017-08-02 NOTE — TELEPHONE ENCOUNTER
Pt will continue with Diclofenac gel (was doing 1x/day will increase to 4x/day per order).  Will do mor ice for inflammation.  Will f/u as needed.KpavelRN

## 2017-08-02 NOTE — TELEPHONE ENCOUNTER
"Reason for call:  Patient reporting a symptom    Symptom or request: Pt saw Dr. MARCK Wu took her off of her Ibuprofen, Naprosyn and Lasix when she saw him 7/21/17.  She is taking Diclofenac and Tylenol.  Pt states that she is in \"so much pain\" in her back and all over her body.  Pt states that she is swollen, in ankles especially and the pain is almost more than she can take.  Pt is sobbing.      Duration (how long have symptoms been present): ongpoing    Have you been treated for this before? Yes    Additional comments:     Phone Number patient can be reached at:  Home number on file [phone] (home)    Best Time:  any    Can we leave a detailed message on this number:  YES    Call taken on 8/2/2017 at 11:05 AM by Catie Jerez    "

## 2017-08-03 ENCOUNTER — DOCUMENTATION ONLY (OUTPATIENT)
Dept: FAMILY MEDICINE | Facility: CLINIC | Age: 57
End: 2017-08-03

## 2017-08-03 DIAGNOSIS — I10 HYPERTENSION, GOAL BELOW 140/90: Primary | ICD-10-CM

## 2017-08-03 DIAGNOSIS — E83.42 HYPOMAGNESEMIA: ICD-10-CM

## 2017-08-03 NOTE — PROGRESS NOTES
Pt is coming in for lab work on 08-08.  She states she needs kidney tested.  Please place orders.    Thanks.

## 2017-08-08 DIAGNOSIS — E83.42 HYPOMAGNESEMIA: ICD-10-CM

## 2017-08-08 DIAGNOSIS — I10 HYPERTENSION, GOAL BELOW 140/90: ICD-10-CM

## 2017-08-08 PROCEDURE — 36415 COLL VENOUS BLD VENIPUNCTURE: CPT | Performed by: FAMILY MEDICINE

## 2017-08-08 PROCEDURE — 80053 COMPREHEN METABOLIC PANEL: CPT | Performed by: FAMILY MEDICINE

## 2017-08-09 DIAGNOSIS — R94.5 ABNORMAL LIVER FUNCTION: Primary | ICD-10-CM

## 2017-08-09 DIAGNOSIS — I10 HYPERTENSION, GOAL BELOW 140/90: ICD-10-CM

## 2017-08-09 LAB
ALBUMIN SERPL-MCNC: 3.6 G/DL (ref 3.4–5)
ALP SERPL-CCNC: 173 U/L (ref 40–150)
ALT SERPL W P-5'-P-CCNC: 54 U/L (ref 0–50)
ANION GAP SERPL CALCULATED.3IONS-SCNC: 7 MMOL/L (ref 3–14)
AST SERPL W P-5'-P-CCNC: 101 U/L (ref 0–45)
BILIRUB SERPL-MCNC: 0.3 MG/DL (ref 0.2–1.3)
BUN SERPL-MCNC: 6 MG/DL (ref 7–30)
CALCIUM SERPL-MCNC: 9.1 MG/DL (ref 8.5–10.1)
CHLORIDE SERPL-SCNC: 90 MMOL/L (ref 94–109)
CO2 SERPL-SCNC: 27 MMOL/L (ref 20–32)
CREAT SERPL-MCNC: 0.83 MG/DL (ref 0.52–1.04)
GFR SERPL CREATININE-BSD FRML MDRD: 71 ML/MIN/1.7M2
GLUCOSE SERPL-MCNC: 98 MG/DL (ref 70–99)
POTASSIUM SERPL-SCNC: 4.6 MMOL/L (ref 3.4–5.3)
PROT SERPL-MCNC: 8.1 G/DL (ref 6.8–8.8)
SODIUM SERPL-SCNC: 124 MMOL/L (ref 133–144)

## 2017-08-28 ENCOUNTER — TELEPHONE (OUTPATIENT)
Dept: FAMILY MEDICINE | Facility: CLINIC | Age: 57
End: 2017-08-28

## 2017-08-28 DIAGNOSIS — M25.50 ARTHRALGIA, UNSPECIFIED JOINT: Primary | ICD-10-CM

## 2017-08-28 NOTE — TELEPHONE ENCOUNTER
Pt requesting Diclofenac Gel PA.  States she is unable to take any oral anti-inflammatories.  Advise.  Yanira

## 2017-08-28 NOTE — TELEPHONE ENCOUNTER
Reason for Call:  Other prescription    Detailed comments: Pt states that her insurance will not cover her Diclofenac Gel Rx.  Pt cannot afford to pay for the med out-pf-pocket and she cannot take any oral anti-inflammatories.  She is asking that Dr. MARCK Wu send a P.A. to her insurance company stating that she must take the gel, because she cannot take any anti-inflammatories.      Phone Number Patient can be reached at: Home number on file [phone] (home)    Best Time: any    Can we leave a detailed message on this number? YES    Call taken on 8/28/2017 at 12:04 PM by Catie Jerez

## 2017-09-05 ENCOUNTER — TELEPHONE (OUTPATIENT)
Dept: PEDIATRICS | Facility: CLINIC | Age: 57
End: 2017-09-05

## 2017-09-05 DIAGNOSIS — E87.1 HYPONATREMIA: Primary | ICD-10-CM

## 2017-09-05 NOTE — TELEPHONE ENCOUNTER
Patient called stating that she has questions with buspar, she states that OD on buspar well on vacation.     Radha VALERO  Station

## 2017-09-05 NOTE — TELEPHONE ENCOUNTER
REYNA:  Spoke with pt @ length about the amt/dose of Buspar she is taking.  Pt has been on Buspar 30 mg 1 tab bid for a little over 2 yrs.  Over the w/e pt took 1 tab AM and then 3 tabs later in the day.  She experienced a blackout and not able to talk with this MORE then prescribed dose.  Was in the ER.  Years prior pt was on a LOWER dose and would take 3-4 tabs.  Discussed options to marking the bottle so she wouldn't take MORE then prescribed.  Marcy

## 2017-09-06 DIAGNOSIS — K21.9 GASTROESOPHAGEAL REFLUX DISEASE WITHOUT ESOPHAGITIS: Primary | ICD-10-CM

## 2017-09-06 RX ORDER — OMEPRAZOLE 10 MG/1
CAPSULE, DELAYED RELEASE ORAL
Qty: 90 CAPSULE | Refills: 3 | Status: SHIPPED | OUTPATIENT
Start: 2017-09-06 | End: 2018-10-15

## 2017-09-06 NOTE — TELEPHONE ENCOUNTER
Request for omeprazole instead of esomeprazole because it is cheaper.    Omperazole      Last Written Prescription Date:  Not on med list  Last Fill Quantity: 0,   # refills: 0  Last Office Visit with G, P or [facility] prescribing provider: 07/21/2017  Future Office visit:       Routing refill request to provider for review/approval because:  Drug not active on patient's medication list      William WOODRUFF (R)

## 2017-09-06 NOTE — TELEPHONE ENCOUNTER
Pt has Esomeprazole 20 mg order in chart.  Requesting Omeprazole, cheaper.  Order pended, check dose?  Advise.  KPavelRN

## 2017-09-12 DIAGNOSIS — R94.5 ABNORMAL LIVER FUNCTION: ICD-10-CM

## 2017-09-12 DIAGNOSIS — I10 HYPERTENSION, GOAL BELOW 140/90: ICD-10-CM

## 2017-09-12 LAB
ANION GAP SERPL CALCULATED.3IONS-SCNC: 7 MMOL/L (ref 3–14)
BUN SERPL-MCNC: 9 MG/DL (ref 7–30)
CALCIUM SERPL-MCNC: 9 MG/DL (ref 8.5–10.1)
CHLORIDE SERPL-SCNC: 89 MMOL/L (ref 94–109)
CO2 SERPL-SCNC: 28 MMOL/L (ref 20–32)
CREAT SERPL-MCNC: 0.84 MG/DL (ref 0.52–1.04)
GFR SERPL CREATININE-BSD FRML MDRD: 69 ML/MIN/1.7M2
GLUCOSE SERPL-MCNC: 91 MG/DL (ref 70–99)
POTASSIUM SERPL-SCNC: 3.8 MMOL/L (ref 3.4–5.3)
SODIUM SERPL-SCNC: 124 MMOL/L (ref 133–144)

## 2017-09-12 PROCEDURE — 80048 BASIC METABOLIC PNL TOTAL CA: CPT | Performed by: FAMILY MEDICINE

## 2017-09-12 PROCEDURE — 36415 COLL VENOUS BLD VENIPUNCTURE: CPT | Performed by: FAMILY MEDICINE

## 2017-09-12 NOTE — TELEPHONE ENCOUNTER
Medication approved from 9/7/17 to 9/7/17  Pharmacy notified and sent to christoph Paz  [facility] [place]

## 2017-09-19 DIAGNOSIS — E87.1 HYPONATREMIA: ICD-10-CM

## 2017-10-13 ENCOUNTER — TELEPHONE (OUTPATIENT)
Dept: PEDIATRICS | Facility: CLINIC | Age: 57
End: 2017-10-13

## 2017-10-13 DIAGNOSIS — K21.9 GASTROESOPHAGEAL REFLUX DISEASE WITHOUT ESOPHAGITIS: ICD-10-CM

## 2017-10-13 NOTE — TELEPHONE ENCOUNTER
[facility] reports that Omeprazole is prescribed over 120 days per 365 calendar year  Medication requires a prior authorization for quantity exception beyond 120 days  Member # [id number]  [facility] #[phone]      Routing     Jaimee MANNING Rn

## 2017-10-13 NOTE — TELEPHONE ENCOUNTER
Patient called stating that she is having difficulties refilling medication; it sounds like medication may have been prescribed greater than 120 days; and md office needs to fill out additional paperwork, patient states that she called insurance; insurance say that md office has not contacted them for reason why patient needs to be on medication.     Nidia Saunders-- pharmacy    Radha VALERO  [facility]

## 2017-10-17 ENCOUNTER — TELEPHONE (OUTPATIENT)
Dept: FAMILY MEDICINE | Facility: CLINIC | Age: 57
End: 2017-10-17

## 2017-10-17 NOTE — TELEPHONE ENCOUNTER
Reason for Call:  Other FYI    Detailed comments: She didn't bring in her UA because of her anxiety and taking too much Buspar.  She was to take 15 mg 2 tablets twice daily.  They went to the [facility].  She took her Buspar 1 tablet in the AM and then took 3 tablets in the PM but the tablets were 30 mg each.  She got to be like a zombie.  She was taken to [place], [place] by ambulance.  She is vomiting 1 day per week but now that is getting better to 2-3 days per month.  She will bring the urine in 1-2 weeks when she feels better.  Her anxiety is still very high.    Phone Number Patient can be reached at: Home number on file [phone] (home)    Best Time: any    Can we leave a detailed message on this number? YES    Call taken on 10/17/2017 at 8:57 AM by Stephanie Mustafa

## 2017-10-19 NOTE — TELEPHONE ENCOUNTER
The PA will be denied regardless.  Patient's insurance will only cover 120 tabs/capsules in a 365 day period.  Patient has already had 120 approved for the year.  She will need to wait until next year or pay out of pocket in the meantime.

## 2017-10-19 NOTE — TELEPHONE ENCOUNTER
Pt notified she would need to pay cash for her Omeprazole for the rest of the year.  She has already received her #120 for the year.  KpavelRN

## 2017-11-04 ENCOUNTER — NURSE TRIAGE (OUTPATIENT)
Dept: NURSING | Facility: CLINIC | Age: 57
End: 2017-11-04

## 2017-11-04 NOTE — TELEPHONE ENCOUNTER
Ongoing anxiety, is on Buspar BID which has normally been working for her. Questions about Celexa being an option?  Questions answered as noted in [employer].  Concerns for nausea/vomiting ongoing, which is caused by her anxiety.      Reason for Disposition    [1] Symptoms of anxiety or panic attack AND [2] is a chronic symptom (recurrent or ongoing AND present > 4 weeks)    Protocols used: ANXIETY AND PANIC ATTACK-ADULT-

## 2018-04-18 DIAGNOSIS — M25.50 ARTHRALGIA, UNSPECIFIED JOINT: ICD-10-CM

## 2018-04-18 NOTE — TELEPHONE ENCOUNTER
Requested Prescriptions   Pending Prescriptions Disp Refills     diclofenac (VOLTAREN) 1 % GEL topical gel  Last Written Prescription Date:  08/29/2017  Last Fill Quantity: 100 g,  # refills: 1   Last office visit: 7/21/2017 with prescribing provider:  PAPI Wu   Future Office Visit:     100 g 1     Sig: Apply 4 grams to knees or 2 grams to hands four times daily using enclosed dosing card.    There is no refill protocol information for this order        William McclendonR)

## 2018-04-18 NOTE — TELEPHONE ENCOUNTER
Requested Prescriptions   Pending Prescriptions Disp Refills     diclofenac (VOLTAREN) 1 % GEL topical gel 100 g 1     Sig: Apply 4 grams to knees or 2 grams to hands four times daily using enclosed dosing card.    There is no refill protocol information for this order        Routing refill request to provider for review/approval because:  Drug not [place] [facility] [place]     Routing to provider.    Jaimee MANNING Rn

## 2018-06-28 DIAGNOSIS — M51.369 DDD (DEGENERATIVE DISC DISEASE), LUMBAR: ICD-10-CM

## 2018-06-28 NOTE — TELEPHONE ENCOUNTER
"Requested Prescriptions   Pending Prescriptions Disp Refills     diclofenac (VOLTAREN) 1 % GEL topical gel  Last Written Prescription Date:  04/18/2018  Last Fill Quantity: 100g,  # refills: 0   Last office visit: 7/21/2017 with prescribing provider:  Stephen   Future Office Visit:           Sig: Place 2 g onto the skin 4 times daily as needed for moderate pain    Topical Steroid Protocol Passed    6/28/2018  9:58 AM       Passed - Patient is age 6 or older       Passed - Authorizing prescriber's most recent note related to this medication read.       Passed - High potency steroid not ordered       Passed - Recent (12 mo) or future (30 days) visit within the authorizing provider's specialty    Patient had office visit in the last 12 months or has a visit in the next 30 days with authorizing provider or within the authorizing provider's specialty.  See \"Patient Info\" tab in inbasket, or \"Choose Columns\" in Meds & Orders section of the refill encounter.              "

## 2018-08-21 ENCOUNTER — OFFICE VISIT (OUTPATIENT)
Dept: FAMILY MEDICINE | Facility: CLINIC | Age: 58
End: 2018-08-21
Payer: MEDICARE

## 2018-08-21 VITALS
SYSTOLIC BLOOD PRESSURE: 99 MMHG | DIASTOLIC BLOOD PRESSURE: 70 MMHG | TEMPERATURE: 96.8 F | WEIGHT: 148 LBS | BODY MASS INDEX: 24.66 KG/M2 | HEART RATE: 74 BPM | OXYGEN SATURATION: 97 % | HEIGHT: 65 IN | RESPIRATION RATE: 12 BRPM

## 2018-08-21 DIAGNOSIS — Z00.00 ROUTINE GENERAL MEDICAL EXAMINATION AT A HEALTH CARE FACILITY: ICD-10-CM

## 2018-08-21 DIAGNOSIS — Z11.4 SCREENING FOR HUMAN IMMUNODEFICIENCY VIRUS: ICD-10-CM

## 2018-08-21 DIAGNOSIS — M51.369 DDD (DEGENERATIVE DISC DISEASE), LUMBAR: ICD-10-CM

## 2018-08-21 DIAGNOSIS — Z13.220 SCREENING FOR HYPERLIPIDEMIA: ICD-10-CM

## 2018-08-21 DIAGNOSIS — F41.9 ANXIETY: ICD-10-CM

## 2018-08-21 DIAGNOSIS — I10 ESSENTIAL HYPERTENSION WITH GOAL BLOOD PRESSURE LESS THAN 140/90: Primary | ICD-10-CM

## 2018-08-21 PROCEDURE — 99396 PREV VISIT EST AGE 40-64: CPT | Performed by: NURSE PRACTITIONER

## 2018-08-21 RX ORDER — ATENOLOL 50 MG/1
25 TABLET ORAL DAILY
Qty: 45 TABLET | Refills: 3 | Status: SHIPPED | OUTPATIENT
Start: 2018-08-21 | End: 2019-07-23

## 2018-08-21 RX ORDER — HYDROXYZINE HYDROCHLORIDE 25 MG/1
25 TABLET, FILM COATED ORAL
Qty: 100 TABLET | Refills: 3 | Status: SHIPPED | OUTPATIENT
Start: 2018-08-21 | End: 2019-07-23

## 2018-08-21 RX ORDER — BUSPIRONE HYDROCHLORIDE 30 MG/1
30 TABLET ORAL 2 TIMES DAILY
Qty: 180 TABLET | Refills: 3 | Status: SHIPPED | OUTPATIENT
Start: 2018-08-21 | End: 2019-05-28

## 2018-08-21 RX ORDER — AMLODIPINE BESYLATE 5 MG/1
5 TABLET ORAL DAILY
Qty: 90 TABLET | Refills: 3 | Status: SHIPPED | OUTPATIENT
Start: 2018-08-21 | End: 2019-04-23 | Stop reason: DRUGHIGH

## 2018-08-21 ASSESSMENT — PAIN SCALES - GENERAL: PAINLEVEL: MODERATE PAIN (5)

## 2018-08-21 NOTE — MR AVS SNAPSHOT
After Visit Summary   8/21/2018    Mihaela Vance    MRN: [id number]           Patient Information     Date Of Birth          1960        Visit Information        Provider Department      8/21/2018 8:20 AM Alicia Cameron APRN [facility]        Today's Diagnoses     Screening for human immunodeficiency virus    -  1    DDD (degenerative disc disease), lumbar        Essential hypertension with goal blood pressure less than 140/90        Anxiety        Itching        Screening for hyperlipidemia          Care Instructions    Return for fasting labs    Debrox is the solution for your ears- you can buy it over the counter    Resume using regular salt        Preventive Health Recommendations  Female Ages 50 - 64    Yearly exam: See your health care provider every year in order to  o Review health changes.   o Discuss preventive care.    o Review your medicines if your doctor has prescribed any.      Get a Pap test every three years (unless you have an abnormal result and your provider advises testing more often).    If you get Pap tests with HPV test, you only need to test every 5 years, unless you have an abnormal result.     You do not need a Pap test if your uterus was removed (hysterectomy) and you have not had cancer.    You should be tested each year for STDs (sexually transmitted diseases) if you're at risk.     Have a mammogram every 1 to 2 years.    Have a colonoscopy at age 50, or have a yearly FIT test (stool test). These exams screen for colon cancer.      Have a cholesterol test every 5 years, or more often if advised.    Have a diabetes test (fasting glucose) every three years. If you are at risk for diabetes, you should have this test more often.     If you are at risk for osteoporosis (brittle bone disease), think about having a bone density scan (DEXA).    Shots: Get a flu shot each year. Get a tetanus shot every 10 years.    Nutrition:     Eat at least 5  servings of fruits and vegetables each day.    Eat whole-grain bread, whole-wheat pasta and brown rice instead of white grains and rice.    Get adequate Calcium and Vitamin D.     Lifestyle    Exercise at least 150 minutes a week (30 minutes a day, 5 days a week). This will help you control your weight and prevent disease.    Limit alcohol to one drink per day.    No smoking.     Wear sunscreen to prevent skin cancer.     See your dentist every six months for an exam and cleaning.    See your eye doctor every 1 to 2 years.            Follow-ups after your visit        Follow-up notes from your care team     Return in about 4 days (around 8/25/2018) for BP Recheck, Lab Work.      Your next 10 appointments already scheduled     Aug 22, 2018  7:30 AM CDT   LAB with [facility]   [facility]    [address] [place] [id number]   [phone]           Please do not eat 10-12 hours before your appointment if you are coming in fasting for labs on lipids, cholesterol, or glucose (sugar). This does not apply to pregnant women. Water, hot tea and black coffee (with nothing added) are okay. Do not drink other fluids, diet soda or chew gum.            Sep 05, 2018  9:15 AM CDT   MA SCREENING DIGITAL BILATERAL with CLMA1   [facility] ([facility])    [address] [place] [id number]   [phone]           Do not use any powder, lotion or deodorant under your arms or on your breast. If you do, we will ask you to remove it before your exam.  Wear comfortable, two-piece clothing.  If you have any allergies, tell your care team.  Bring any previous mammograms from other facilities or have them mailed to the breast center.              Future tests that were ordered for you today     Open Future Orders        Priority Expected Expires Ordered    **Comprehensive metabolic panel FUTURE anytime Routine 8/21/2018 8/21/2019  "8/21/2018    Lipid panel reflex to direct LDL Fasting Routine 8/21/2018 8/21/2019 8/21/2018    **HIV Antigen Antibody Combo FUTURE anytime Routine 8/21/2018 8/21/2019 8/21/2018            Who to contact     If you have questions or need follow up information about today's clinic visit or your schedule please contact [facility] directly at [phone].  Normal or non-critical lab and imaging results will be communicated to you by MyChart, letter or phone within 4 business days after the clinic has received the results. If you do not hear from us within 7 days, please contact the clinic through MyChart or phone. If you have a critical or abnormal lab result, we will notify you by phone as soon as possible.  Submit refill requests through [employer] or call your pharmacy and they will forward the refill request to us. Please allow 3 business days for your refill to be completed.          Additional Information About Your Visit        Care EveryWhere ID     This is your Care EveryWhere ID. This could be used by other organizations to access your [facility] medical records  [id number]        Your Vitals Were     Pulse Temperature Respirations Height Last Period Pulse Oximetry    74 96.8  F (36  C) (Tympanic) 12 5' 5\" (1.651 m) 09/16/2006 97%    Breastfeeding? BMI (Body Mass Index)                No 24.63 kg/m2           Blood Pressure from Last 3 Encounters:   08/21/18 99/70   07/21/17 108/82   02/01/17 120/80    Weight from Last 3 Encounters:   08/21/18 148 lb (67.1 kg)   07/21/17 155 lb (70.3 kg)   02/01/17 171 lb (77.6 kg)                 Today's Medication Changes          These changes are accurate as of 8/21/18 11:44 AM.  If you have any questions, ask your nurse or doctor.               These medicines have changed or have updated prescriptions.        Dose/Directions    BusPIRone HCl 30 MG Tabs   This may have changed:  how much to take   Used for:  Anxiety   Changed by:  Alicia Cameron, " APRN CNP        Dose:  30 mg   Take 30 mg by mouth 2 times daily   Quantity:  180 tablet   Refills:  3            Where to get your medicines      These medications were sent to [facility] - - [place] - [id number] [facility]  [id number] [place] [address], [place] [address]    Hours:  [place] Phone:  [phone]     amLODIPine 5 MG tablet    atenolol 50 MG tablet    BusPIRone HCl 30 MG Tabs    diclofenac 1 % Gel topical gel    hydrOXYzine 25 MG tablet                Primary Care Provider    Ivan Wu MD       No address on file        Equal Access to Services     [facility]: Hadii malika barr hadasho Soomaali, waaxda luqadaha, qaybta kaalmada adeegyasyeda, dagoberto marvin . So [facility] [phone].    ATENCIÓN: Si habla español, tiene a guallpa disposición servicios gratuitos de asistencia lingüística. [facility] [phone].    We comply with applicable federal civil rights laws and [place] laws. We do not discriminate on the basis of race, color, national origin, age, disability, sex, sexual orientation, or gender identity.            Thank you!     Thank you for choosing [facility]  for your care. Our goal is always to provide you with excellent care. Hearing back from our patients is one way we can continue to improve our services. Please take a few minutes to complete the written survey that you may receive in the mail after your visit with us. Thank you!             Your Updated Medication List - Protect others around you: Learn how to safely use, store and throw away your medicines at www.disposemymeds.org.          This list is accurate as of 8/21/18 11:44 AM.  Always use your most recent med list.                   Brand Name Dispense Instructions for use Diagnosis    amLODIPine 5 MG tablet    NORVASC    90 tablet    Take 1 tablet (5 mg) by mouth daily    Essential hypertension with goal blood pressure less than 140/90        atenolol 50 MG tablet    TENORMIN    45 tablet    Take 0.5 tablets (25 mg) by mouth daily    Essential hypertension with goal blood pressure less than 140/90       B COMPLEX PO      Take 1 tablet by mouth daily.    Generalized anxiety disorder, Panic disorder without agoraphobia       BusPIRone HCl 30 MG Tabs     180 tablet    Take 30 mg by mouth 2 times daily    Anxiety       CALCIUM + D PO      Take 2 tablets by mouth daily.    Generalized anxiety disorder, Panic disorder without agoraphobia       celecoxib 200 MG capsule    celeBREX    90 capsule    Take 1 capsule (200 mg) by mouth daily As needed    Generalized pain       cyclobenzaprine 10 MG tablet    FLEXERIL    30 tablet    Take 1 tablet (10 mg) by mouth 3 times daily    DDD (degenerative disc disease), lumbar       * diclofenac 1 % Gel topical gel    VOLTAREN    100 g    Apply 4 grams to knees or 2 grams to hands four times daily using enclosed dosing card.    Arthralgia, unspecified joint       * diclofenac 1 % Gel topical gel    VOLTAREN    60 g    Place 2 g onto the skin 4 times daily as needed for moderate pain    DDD (degenerative disc disease), lumbar       FISH OIL PO      Take 1 tablet by mouth daily.    Generalized anxiety disorder, Panic disorder without agoraphobia       FOLIC ACID PO      Take 400 mcg by mouth daily        furosemide 20 MG tablet    LASIX    90 tablet    Take 1 tablet (20 mg) by mouth daily    Essential hypertension with goal blood pressure less than 140/90       hydrOXYzine 25 MG tablet    ATARAX    100 tablet    Take 1 tablet (25 mg) by mouth nightly as needed for itching or other (sleep)    Itching       magnesium oxide 400 MG tablet    MAG-OX    60 tablet    Take 1 tablet (400 mg) by mouth daily    Anemia       omeprazole 10 MG CR capsule    priLOSEC    90 capsule    Take by mouth 30-60 minutes before a meal.    Gastroesophageal reflux disease without esophagitis       spironolactone 25 MG tablet    ALDACTONE     Take  50 mg by mouth daily        VITAMIN D (CHOLECALCIFEROL) PO      Take by mouth daily        * Notice:  This list has 2 medication(s) that are the same as other medications prescribed for you. Read the directions carefully, and ask your doctor or other care provider to review them with you.

## 2018-08-21 NOTE — PATIENT INSTRUCTIONS
Return for fasting labs    Debrox is the solution for your ears- you can buy it over the counter    Resume using regular salt        Preventive Health Recommendations  Female Ages 50 - 64    Yearly exam: See your health care provider every year in order to  o Review health changes.   o Discuss preventive care.    o Review your medicines if your doctor has prescribed any.      Get a Pap test every three years (unless you have an abnormal result and your provider advises testing more often).    If you get Pap tests with HPV test, you only need to test every 5 years, unless you have an abnormal result.     You do not need a Pap test if your uterus was removed (hysterectomy) and you have not had cancer.    You should be tested each year for STDs (sexually transmitted diseases) if you're at risk.     Have a mammogram every 1 to 2 years.    Have a colonoscopy at age 50, or have a yearly FIT test (stool test). These exams screen for colon cancer.      Have a cholesterol test every 5 years, or more often if advised.    Have a diabetes test (fasting glucose) every three years. If you are at risk for diabetes, you should have this test more often.     If you are at risk for osteoporosis (brittle bone disease), think about having a bone density scan (DEXA).    Shots: Get a flu shot each year. Get a tetanus shot every 10 years.    Nutrition:     Eat at least 5 servings of fruits and vegetables each day.    Eat whole-grain bread, whole-wheat pasta and brown rice instead of white grains and rice.    Get adequate Calcium and Vitamin D.     Lifestyle    Exercise at least 150 minutes a week (30 minutes a day, 5 days a week). This will help you control your weight and prevent disease.    Limit alcohol to one drink per day.    No smoking.     Wear sunscreen to prevent skin cancer.     See your dentist every six months for an exam and cleaning.    See your eye doctor every 1 to 2 years.

## 2018-08-21 NOTE — NURSING NOTE
Attempted to flush both ears with 2 bottles of water per ear unable to get wax to come out pt will get ear drops from pharmacy and come back in for nurse only visit to have re flushed. Meghan Urrutia CMA
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
## 2018-08-22 DIAGNOSIS — I10 ESSENTIAL HYPERTENSION WITH GOAL BLOOD PRESSURE LESS THAN 140/90: ICD-10-CM

## 2018-08-22 DIAGNOSIS — Z11.4 SCREENING FOR HUMAN IMMUNODEFICIENCY VIRUS: ICD-10-CM

## 2018-08-22 DIAGNOSIS — Z13.220 SCREENING FOR HYPERLIPIDEMIA: ICD-10-CM

## 2018-08-22 LAB
ALBUMIN SERPL-MCNC: 3.6 G/DL (ref 3.4–5)
ALP SERPL-CCNC: 173 U/L (ref 40–150)
ALT SERPL W P-5'-P-CCNC: 62 U/L (ref 0–50)
ANION GAP SERPL CALCULATED.3IONS-SCNC: 12 MMOL/L (ref 3–14)
AST SERPL W P-5'-P-CCNC: 139 U/L (ref 0–45)
BILIRUB SERPL-MCNC: 0.5 MG/DL (ref 0.2–1.3)
BUN SERPL-MCNC: 6 MG/DL (ref 7–30)
CALCIUM SERPL-MCNC: 8.7 MG/DL (ref 8.5–10.1)
CHLORIDE SERPL-SCNC: 94 MMOL/L (ref 94–109)
CHOLEST SERPL-MCNC: 243 MG/DL
CO2 SERPL-SCNC: 25 MMOL/L (ref 20–32)
CREAT SERPL-MCNC: 0.9 MG/DL (ref 0.52–1.04)
GFR SERPL CREATININE-BSD FRML MDRD: 64 ML/MIN/1.7M2
GLUCOSE SERPL-MCNC: 76 MG/DL (ref 70–99)
HDLC SERPL-MCNC: 109 MG/DL
LDLC SERPL CALC-MCNC: 124 MG/DL
NONHDLC SERPL-MCNC: 134 MG/DL
POTASSIUM SERPL-SCNC: 4.2 MMOL/L (ref 3.4–5.3)
PROT SERPL-MCNC: 8.6 G/DL (ref 6.8–8.8)
SODIUM SERPL-SCNC: 131 MMOL/L (ref 133–144)
TRIGL SERPL-MCNC: 52 MG/DL

## 2018-08-22 PROCEDURE — 80061 LIPID PANEL: CPT | Performed by: NURSE PRACTITIONER

## 2018-08-22 PROCEDURE — 87389 HIV-1 AG W/HIV-1&-2 AB AG IA: CPT | Performed by: NURSE PRACTITIONER

## 2018-08-22 PROCEDURE — 36415 COLL VENOUS BLD VENIPUNCTURE: CPT | Performed by: NURSE PRACTITIONER

## 2018-08-22 PROCEDURE — 80053 COMPREHEN METABOLIC PANEL: CPT | Performed by: NURSE PRACTITIONER

## 2018-08-23 LAB — HIV 1+2 AB+HIV1 P24 AG SERPL QL IA: NONREACTIVE

## 2018-08-28 ENCOUNTER — OFFICE VISIT (OUTPATIENT)
Dept: FAMILY MEDICINE | Facility: CLINIC | Age: 58
End: 2018-08-28
Payer: MEDICARE

## 2018-08-28 DIAGNOSIS — E78.5 HYPERLIPIDEMIA LDL GOAL <100: Primary | ICD-10-CM

## 2018-08-28 RX ORDER — ATORVASTATIN CALCIUM 20 MG/1
20 TABLET, FILM COATED ORAL DAILY
Qty: 90 TABLET | Refills: 1 | Status: SHIPPED | OUTPATIENT
Start: 2018-08-28 | End: 2018-09-10 | Stop reason: ALTCHOICE

## 2018-08-28 NOTE — MR AVS SNAPSHOT
After Visit Summary   8/28/2018    Mihaela Vance    MRN: [id number]           Patient Information     Date Of Birth          1960        Visit Information        Provider Department      8/28/2018 9:00 AM Alicia Cameron APRN CNP [facility]        Today's Diagnoses     Hyperlipidemia LDL goal <100    -  1       Follow-ups after your visit        Who to contact     If you have questions or need follow up information about today's clinic visit or your schedule please contact [facility] directly at [phone].  Normal or non-critical lab and imaging results will be communicated to you by MyChart, letter or phone within 4 business days after the clinic has received the results. If you do not hear from us within 7 days, please contact the clinic through MyChart or phone. If you have a critical or abnormal lab result, we will notify you by phone as soon as possible.  Submit refill requests through [employer] or call your pharmacy and they will forward the refill request to us. Please allow 3 business days for your refill to be completed.          Additional Information About Your Visit        Care EveryWhere ID     This is your Care EveryWhere ID. This could be used by other organizations to access your [place] medical records  [id number]        Your Vitals Were     Last Period                   09/16/2006            Blood Pressure from Last 3 Encounters:   08/21/18 99/70   07/21/17 108/82   02/01/17 120/80    Weight from Last 3 Encounters:   08/21/18 148 lb (67.1 kg)   07/21/17 155 lb (70.3 kg)   02/01/17 171 lb (77.6 kg)                 Today's Medication Changes          These changes are accurate as of 8/28/18 11:59 PM.  If you have any questions, ask your nurse or doctor.               Start taking these medicines.        Dose/Directions    atorvastatin 20 MG tablet   Commonly known as:  LIPITOR   Used for:  Hyperlipidemia LDL goal <100   Started by:   Alicia Cameron, APRN CNP        Dose:  20 mg   Take 1 tablet (20 mg) by mouth daily   Quantity:  90 tablet   Refills:  1            Where to get your medicines      These medications were sent to [facility] - - [place] - [id number] [address]    Hours:  [facility] Phone:  [phone]     atorvastatin 20 MG tablet                Primary Care Provider    Ivan Wu MD       No address on file        Equal Access to Services     [facility]: Hadii aad ku hadasho Soomaali, waaxda luqadaha, qaybta kaalmada adeegyada, waxay idiin hayaan adeeg kharagera marvin . So [facility] [phone].    ATENCIÓN: Si habla español, tiene a gulalpa disposición servicios gratuitos de asistencia lingüística. [facility] [phone].    We comply with applicable federal civil rights laws and [place] laws. We do not discriminate on the basis of race, color, national origin, age, disability, sex, sexual orientation, or gender identity.            Thank you!     Thank you for choosing [facility]  for your care. Our goal is always to provide you with excellent care. Hearing back from our patients is one way we can continue to improve our services. Please take a few minutes to complete the written survey that you may receive in the mail after your visit with us. Thank you!             Your Updated Medication List - Protect others around you: Learn how to safely use, store and throw away your medicines at www.disposemymeds.org.          This list is accurate as of 8/28/18 11:59 PM.  Always use your most recent med list.                   Brand Name Dispense Instructions for use Diagnosis    amLODIPine 5 MG tablet    NORVASC    90 tablet    Take 1 tablet (5 mg) by mouth daily    Essential hypertension with goal blood pressure less than 140/90       atenolol 50 MG tablet    TENORMIN    45 tablet    Take 0.5 tablets (25 mg) by mouth  daily    Essential hypertension with goal blood pressure less than 140/90       atorvastatin 20 MG tablet    LIPITOR    90 tablet    Take 1 tablet (20 mg) by mouth daily    Hyperlipidemia LDL goal <100       B COMPLEX PO      Take 1 tablet by mouth daily.    Generalized anxiety disorder, Panic disorder without agoraphobia       BusPIRone HCl 30 MG Tabs     180 tablet    Take 30 mg by mouth 2 times daily    Anxiety       CALCIUM + D PO      Take 2 tablets by mouth daily.    Generalized anxiety disorder, Panic disorder without agoraphobia       celecoxib 200 MG capsule    celeBREX    90 capsule    Take 1 capsule (200 mg) by mouth daily As needed    Generalized pain       cyclobenzaprine 10 MG tablet    FLEXERIL    30 tablet    Take 1 tablet (10 mg) by mouth 3 times daily    DDD (degenerative disc disease), lumbar       diclofenac 1 % Gel topical gel    VOLTAREN    60 g    Place 2 g onto the skin 4 times daily as needed for moderate pain    DDD (degenerative disc disease), lumbar       FISH OIL PO      Take 1 tablet by mouth daily.    Generalized anxiety disorder, Panic disorder without agoraphobia       FOLIC ACID PO      Take 400 mcg by mouth daily        furosemide 20 MG tablet    LASIX    90 tablet    Take 1 tablet (20 mg) by mouth daily    Essential hypertension with goal blood pressure less than 140/90       hydrOXYzine 25 MG tablet    ATARAX    100 tablet    Take 1 tablet (25 mg) by mouth nightly as needed for itching or other (sleep)        magnesium oxide 400 MG tablet    MAG-OX    60 tablet    Take 1 tablet (400 mg) by mouth daily    Anemia       omeprazole 10 MG CR capsule    priLOSEC    90 capsule    Take by mouth 30-60 minutes before a meal.    Gastroesophageal reflux disease without esophagitis       VITAMIN D (CHOLECALCIFEROL) PO      Take by mouth daily

## 2018-08-28 NOTE — PROGRESS NOTES
"Mihaela Vance is a 58 year old female who is being evaluated via a telephone visit.      The patient has been notified of following:     \"This telephone visit will be conducted via a call between you and your physician/provider. We have found that certain health care needs can be provided without the need for a physical exam.  This service lets us provide the care you need with a short phone conversation.  If a prescription is necessary we can send it directly to your pharmacy.  If lab work is needed we can place an order for that and you can then stop by our lab to have the test done at a later time.    We will bill your insurance company for this service.  Please check with your medical insurance if this type of visit is covered. You may be responsible for the cost of this type of visit if insurance coverage is denied.  The typical cost is $30 (10min), $59 (11-20min) and $85 (21-30min).  Most often these visits are shorter than 10 minutes.    If during the course of the call the physician/provider feels a telephone visit is not appropriate, you will not be charged for this service.\"       Consent has been obtained for this service by care team member: yes.   See the scanned image in the medical record.    Mihaela Vance complains of  Telephone (televisit to discuss lipids results and potential medication)      I have reviewed and updated the patient's Past Medical History, Social History, Family History and Medication List.    ALLERGIES  Lisinopril; Ativan; Sertraline; Tizanidine; Hctz [hydrochlorothiazide]; and Seroquel [quetiapine fumarate]    Cassy Nash CMA    (MA signature)    Additional provider notes: Patient called to review results of lipid panel.  See labs below.  Currently she is on fish oil.  Patient has a history of hypertension, family history of diabetes.  Recommended that we start a statin.  Patient questions answered.  She is in agreement with starting a statin.  I have asked her to continue " taking her fish oil.  Discussed dietary modifications, increasing exercise.        Lab Results   Component Value Date    CHOL 243 08/22/2018    CHOL 225 06/24/2016     Lab Results   Component Value Date     08/22/2018    HDL 79 06/24/2016     Lab Results   Component Value Date     08/22/2018     07/21/2017     06/24/2016     Lab Results   Component Value Date    TRIG 52 08/22/2018    TRIG 108 06/24/2016     Lab Results   Component Value Date    CHOLHDLRATIO 1.6 04/23/2015    CHOLHDLRATIO 2.0 07/23/2013       Assessment/Plan:  Hyperlipidemia.  Start atorvastatin.  Follow-up lipid panel in 3 months.    I have reviewed the note as documented above.  This accurately captures the substance of my conversation with the patient,  Alicia Cameron RNC, NP      Total time of call between patient and provider was 15 minutes

## 2018-09-05 ENCOUNTER — ALLIED HEALTH/NURSE VISIT (OUTPATIENT)
Dept: FAMILY MEDICINE | Facility: CLINIC | Age: 58
End: 2018-09-05
Payer: MEDICARE

## 2018-09-05 ENCOUNTER — RADIANT APPOINTMENT (OUTPATIENT)
Dept: MAMMOGRAPHY | Facility: CLINIC | Age: 58
End: 2018-09-05
Attending: NURSE PRACTITIONER
Payer: MEDICARE

## 2018-09-05 DIAGNOSIS — Z12.31 VISIT FOR SCREENING MAMMOGRAM: ICD-10-CM

## 2018-09-05 DIAGNOSIS — H61.23 BILATERAL IMPACTED CERUMEN: Primary | ICD-10-CM

## 2018-09-05 PROCEDURE — 77067 SCR MAMMO BI INCL CAD: CPT | Mod: TC

## 2018-09-05 PROCEDURE — 99207 ZZC NO CHARGE NURSE ONLY: CPT

## 2018-09-05 NOTE — MR AVS SNAPSHOT
After Visit Summary   9/5/2018    Mihaela Vance    MRN: [id number]           Patient Information     Date Of Birth          1960        Visit Information        Provider Department      9/5/2018 9:45 AM FL SAMARA RN [facility]        Today's Diagnoses     Bilateral impacted cerumen    -  1       Follow-ups after your visit        Who to contact     If you have questions or need follow up information about today's clinic visit or your schedule please contact [facility] directly at [phone].  Normal or non-critical lab and imaging results will be communicated to you by MyChart, letter or phone within 4 business days after the clinic has received the results. If you do not hear from us within 7 days, please contact the clinic through MyChart or phone. If you have a critical or abnormal lab result, we will notify you by phone as soon as possible.  Submit refill requests through [employer] or call your pharmacy and they will forward the refill request to us. Please allow 3 business days for your refill to be completed.          Additional Information About Your Visit        Care EveryWhere ID     This is your Care EveryWhere ID. This could be used by other organizations to access your [place] medical records  [id number]        Your Vitals Were     Last Period                   09/16/2006            Blood Pressure from Last 3 Encounters:   08/21/18 99/70   07/21/17 108/82   02/01/17 120/80    Weight from Last 3 Encounters:   08/21/18 148 lb (67.1 kg)   07/21/17 155 lb (70.3 kg)   02/01/17 171 lb (77.6 kg)              Today, you had the following     No orders found for display       Primary Care Provider    Ivan Wu MD       No address on file        Equal Access to Services     [facility]: Hadii malika Sparks, walisada luqadaha, qaybta dagoberto reynolds . [facility] [phone].    ATENCIÓN: Si  ruma cortés, tiene a guallpa disposición servicios gratuitos de asistencia lingüística. [facility] [phone].    We comply with applicable federal civil rights laws and [place] laws. We do not discriminate on the basis of race, color, national origin, age, disability, sex, sexual orientation, or gender identity.            Thank you!     Thank you for choosing [facility]  for your care. Our goal is always to provide you with excellent care. Hearing back from our patients is one way we can continue to improve our services. Please take a few minutes to complete the written survey that you may receive in the mail after your visit with us. Thank you!             Your Updated Medication List - Protect others around you: Learn how to safely use, store and throw away your medicines at www.disposemymeds.org.          This list is accurate as of 9/5/18  9:59 AM.  Always use your most recent med list.                   Brand Name Dispense Instructions for use Diagnosis    amLODIPine 5 MG tablet    NORVASC    90 tablet    Take 1 tablet (5 mg) by mouth daily    Essential hypertension with goal blood pressure less than 140/90       atenolol 50 MG tablet    TENORMIN    45 tablet    Take 0.5 tablets (25 mg) by mouth daily    Essential hypertension with goal blood pressure less than 140/90       atorvastatin 20 MG tablet    LIPITOR    90 tablet    Take 1 tablet (20 mg) by mouth daily    Hyperlipidemia LDL goal <100       B COMPLEX PO      Take 1 tablet by mouth daily.    Generalized anxiety disorder, Panic disorder without agoraphobia       BusPIRone HCl 30 MG Tabs     180 tablet    Take 30 mg by mouth 2 times daily    Anxiety       CALCIUM + D PO      Take 2 tablets by mouth daily.    Generalized anxiety disorder, Panic disorder without agoraphobia       celecoxib 200 MG capsule    celeBREX    90 capsule    Take 1 capsule (200 mg) by mouth daily As needed    Generalized pain       cyclobenzaprine 10 MG tablet     FLEXERIL    30 tablet    Take 1 tablet (10 mg) by mouth 3 times daily    DDD (degenerative disc disease), lumbar       diclofenac 1 % Gel topical gel    VOLTAREN    60 g    Place 2 g onto the skin 4 times daily as needed for moderate pain    DDD (degenerative disc disease), lumbar       FISH OIL PO      Take 1 tablet by mouth daily.    Generalized anxiety disorder, Panic disorder without agoraphobia       FOLIC ACID PO      Take 400 mcg by mouth daily        furosemide 20 MG tablet    LASIX    90 tablet    Take 1 tablet (20 mg) by mouth daily    Essential hypertension with goal blood pressure less than 140/90       hydrOXYzine 25 MG tablet    ATARAX    100 tablet    Take 1 tablet (25 mg) by mouth nightly as needed for itching or other (sleep)        magnesium oxide 400 MG tablet    MAG-OX    60 tablet    Take 1 tablet (400 mg) by mouth daily    Anemia       omeprazole 10 MG CR capsule    priLOSEC    90 capsule    Take by mouth 30-60 minutes before a meal.    Gastroesophageal reflux disease without esophagitis       VITAMIN D (CHOLECALCIFEROL) PO      Take by mouth daily

## 2018-09-05 NOTE — PROGRESS NOTES
Ears are plugged with cerumen, difficulty hearing.  Was seen last week and told to use Debrox.  Ear canals filled with wax, no drainage or open wounds.  Ears irrigated with warm water and flushed clear.  TM's visible.  Pt tolerated with no problems.  KpavelRN

## 2018-09-10 ENCOUNTER — TELEPHONE (OUTPATIENT)
Dept: FAMILY MEDICINE | Facility: CLINIC | Age: 58
End: 2018-09-10

## 2018-09-10 DIAGNOSIS — E78.5 HYPERLIPIDEMIA LDL GOAL <100: Primary | ICD-10-CM

## 2018-09-10 RX ORDER — GEMFIBROZIL 600 MG/1
600 TABLET, FILM COATED ORAL 2 TIMES DAILY
Qty: 60 TABLET | Refills: 1 | Status: SHIPPED | OUTPATIENT
Start: 2018-09-10 | End: 2019-04-23

## 2018-09-10 NOTE — TELEPHONE ENCOUNTER
Thank you.  Will discontinue the atorvastatin.  Would like her to start gemfibrozil 600 mg twice daily 30 minutes before meals.  Order has been entered and is pended in system.  Please check on pharmacy.Alicia Cameron RNC, NP

## 2018-09-10 NOTE — TELEPHONE ENCOUNTER
Pt states she should not take Atorvastatin since she has liver problems.  ALT, AST and GGT were abnormal on 4/30/15.  Advise.  ThaliaRN

## 2018-09-10 NOTE — TELEPHONE ENCOUNTER
Reason for call:  Patient reporting a symptom    Symptom or request: Pt was recently prescribed Atorvastatin, by ERNESTO Cameron and she states that she cannot take this med due to having liver issues in the past.  Please call patient and advise.      Duration (how long have symptoms been present): ongoing    Have you been treated for this before? Yes    Additional comments:     Phone Number patient can be reached at:  Home number on file [phone] (home)    Best Time:  any    Can we leave a detailed message on this number:  YES    Call taken on 9/10/2018 at 1:56 PM by Catie Jerez

## 2018-10-15 DIAGNOSIS — K21.9 GASTROESOPHAGEAL REFLUX DISEASE WITHOUT ESOPHAGITIS: ICD-10-CM

## 2018-10-15 NOTE — TELEPHONE ENCOUNTER
"Requested Prescriptions   Pending Prescriptions Disp Refills     omeprazole (PRILOSEC) 10 MG CR capsule  Last Written Prescription Date:  9/6/2017  Last Fill Quantity: 90,  # refills: 3   Last office visit: 9/5/2018 with prescribing provider:  Rakesh   Future Office Visit:     90 capsule 3     Sig: Take by mouth 30-60 minutes before a meal.    PPI Protocol Failed    10/15/2018  6:40 PM       Failed - Recent (12 mo) or future (30 days) visit within the authorizing provider's specialty    Patient had office visit in the last 12 months or has a visit in the next 30 days with authorizing provider or within the authorizing provider's specialty.  See \"Patient Info\" tab in inbasket, or \"Choose Columns\" in Meds & Orders section of the refill encounter.           Passed - Not on Clopidogrel (unless Pantoprazole ordered)       Passed - No diagnosis of osteoporosis on record       Passed - Patient is age 18 or older       Passed - No active pregnacy on record       Passed - No positive pregnancy test in past 12 months          "

## 2018-10-16 RX ORDER — OMEPRAZOLE 10 MG/1
CAPSULE, DELAYED RELEASE ORAL
Qty: 90 CAPSULE | Refills: 2 | Status: ON HOLD | OUTPATIENT
Start: 2018-10-16 | End: 2019-04-25

## 2018-10-16 NOTE — TELEPHONE ENCOUNTER
LOV 8/21/18 with Rakesh.  Prescription approved per [facility] Refill Protocol.  Judy TURPIN RN

## 2019-03-25 DIAGNOSIS — I10 ESSENTIAL HYPERTENSION WITH GOAL BLOOD PRESSURE LESS THAN 140/90: ICD-10-CM

## 2019-03-25 NOTE — TELEPHONE ENCOUNTER
"Furosemide   Last Written Prescription Date:  7/21/2017  Last Fill Quantity: 90,  # refills: 3   Last office visit: 8/21/2018 with prescribing provider:  MISAEL   Future Office Visit:         Requested Prescriptions   Pending Prescriptions Disp Refills     furosemide (LASIX) 20 MG tablet 90 tablet 3     Sig: Take 1 tablet (20 mg) by mouth daily    Diuretics (Including Combos) Protocol Failed - 3/25/2019  6:01 PM       Failed - Recent (12 mo) or future (30 days) visit within the authorizing provider's specialty    Patient had office visit in the last 12 months or has a visit in the next 30 days with authorizing provider or within the authorizing provider's specialty.  See \"Patient Info\" tab in inbasket, or \"Choose Columns\" in Meds & Orders section of the refill encounter.             Failed - Normal serum sodium on file in past 12 months    Recent Labs   Lab Test 08/22/18  0800   *             Passed - Blood pressure under 140/90 in past 12 months    BP Readings from Last 3 Encounters:   08/21/18 99/70   07/21/17 108/82   02/01/17 120/80                Passed - Medication is active on med list       Passed - Patient is age 18 or older       Passed - No active pregancy on record       Passed - Normal serum creatinine on file in past 12 months    Recent Labs   Lab Test 08/22/18  0800   CR 0.90             Passed - Normal serum potassium on file in past 12 months    Recent Labs   Lab Test 08/22/18  0800   POTASSIUM 4.2                   Passed - No positive pregnancy test in past 12 months            "

## 2019-03-26 RX ORDER — FUROSEMIDE 20 MG
20 TABLET ORAL DAILY
Qty: 90 TABLET | Refills: 0 | Status: SHIPPED | OUTPATIENT
Start: 2019-03-26 | End: 2019-04-23 | Stop reason: DRUGHIGH

## 2019-03-26 NOTE — TELEPHONE ENCOUNTER
I left a message for the patient to return call to clinic.  CSS may deliver message below from Dr. Wu.    Gisele ANGULO RN

## 2019-04-01 NOTE — TELEPHONE ENCOUNTER
Patient returned our call, and message given.She will make an appt.  Jina [facility]  Clinic Station [place]

## 2019-04-19 ENCOUNTER — TELEPHONE (OUTPATIENT)
Dept: FAMILY MEDICINE | Facility: CLINIC | Age: 59
End: 2019-04-19

## 2019-04-19 NOTE — TELEPHONE ENCOUNTER
"Reason for call:  Patient reporting a symptom    Symptom or request: pt is calling to report itching through out whole body, also pt has had fainting problems and thinks it is BP related, this happened a while ago and is starting to happen again. Pt would like to be seen for this and also check for diabetes     Duration (how long have symptoms been present): \"a while, cant tell exact time\"     Have you been treated for this before? Yes for itching in the way past    Additional comments: pt does not have a PCP currently     Phone Number patient can be reached at:  Home number on file [phone] (home)    Best Time:  any    Can we leave a detailed message on this number:  YES    Call taken on 4/19/2019 at 10:58 AM by Meghan Ramirez    "
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
## 2019-04-23 ENCOUNTER — TELEPHONE (OUTPATIENT)
Dept: FAMILY MEDICINE | Facility: CLINIC | Age: 59
End: 2019-04-23

## 2019-04-23 ENCOUNTER — HOSPITAL ENCOUNTER (INPATIENT)
Facility: CLINIC | Age: 59
LOS: 2 days | Discharge: HOME | DRG: 378 | End: 2019-04-25
Attending: FAMILY MEDICINE | Admitting: INTERNAL MEDICINE
Payer: COMMERCIAL

## 2019-04-23 ENCOUNTER — OFFICE VISIT (OUTPATIENT)
Dept: FAMILY MEDICINE | Facility: CLINIC | Age: 59
End: 2019-04-23
Payer: COMMERCIAL

## 2019-04-23 VITALS
TEMPERATURE: 97.9 F | DIASTOLIC BLOOD PRESSURE: 50 MMHG | RESPIRATION RATE: 16 BRPM | HEIGHT: 65 IN | HEART RATE: 82 BPM | OXYGEN SATURATION: 99 % | SYSTOLIC BLOOD PRESSURE: 90 MMHG | WEIGHT: 139.4 LBS | BODY MASS INDEX: 23.22 KG/M2

## 2019-04-23 DIAGNOSIS — R01.1 UNDIAGNOSED CARDIAC MURMURS: ICD-10-CM

## 2019-04-23 DIAGNOSIS — K70.30 ALCOHOLIC CIRRHOSIS OF LIVER WITHOUT ASCITES (H): Primary | ICD-10-CM

## 2019-04-23 DIAGNOSIS — R55 SYNCOPE, UNSPECIFIED SYNCOPE TYPE: ICD-10-CM

## 2019-04-23 DIAGNOSIS — E87.1 HYPONATREMIA: ICD-10-CM

## 2019-04-23 DIAGNOSIS — Z59.86: ICD-10-CM

## 2019-04-23 DIAGNOSIS — R63.4 ABNORMAL WEIGHT LOSS: ICD-10-CM

## 2019-04-23 DIAGNOSIS — K92.2 GASTROINTESTINAL HEMORRHAGE, UNSPECIFIED GASTROINTESTINAL HEMORRHAGE TYPE: Primary | ICD-10-CM

## 2019-04-23 DIAGNOSIS — F10.920 ALCOHOLIC INTOXICATION WITHOUT COMPLICATION (H): ICD-10-CM

## 2019-04-23 DIAGNOSIS — D64.9 ACUTE ANEMIA: ICD-10-CM

## 2019-04-23 DIAGNOSIS — E87.6 HYPOKALEMIA: ICD-10-CM

## 2019-04-23 DIAGNOSIS — I95.1 ORTHOSTATIC HYPOTENSION: ICD-10-CM

## 2019-04-23 DIAGNOSIS — L29.9 PRURITIC DISORDER: ICD-10-CM

## 2019-04-23 LAB
ALBUMIN SERPL-MCNC: 3.3 G/DL (ref 3.4–5)
ALBUMIN SERPL-MCNC: 3.5 G/DL (ref 3.4–5)
ALP SERPL-CCNC: 87 U/L (ref 40–150)
ALP SERPL-CCNC: 89 U/L (ref 40–150)
ALT SERPL W P-5'-P-CCNC: 17 U/L (ref 0–50)
ALT SERPL W P-5'-P-CCNC: 17 U/L (ref 0–50)
ANION GAP SERPL CALCULATED.3IONS-SCNC: 6 MMOL/L (ref 3–14)
ANION GAP SERPL CALCULATED.3IONS-SCNC: 9 MMOL/L (ref 3–14)
APTT PPP: 23 SEC (ref 22–37)
AST SERPL W P-5'-P-CCNC: 19 U/L (ref 0–45)
AST SERPL W P-5'-P-CCNC: 25 U/L (ref 0–45)
BASOPHILS # BLD AUTO: 0 10E9/L (ref 0–0.2)
BASOPHILS NFR BLD AUTO: 0.4 %
BILIRUB SERPL-MCNC: 0.4 MG/DL (ref 0.2–1.3)
BILIRUB SERPL-MCNC: 0.5 MG/DL (ref 0.2–1.3)
BLD PROD TYP BPU: NORMAL
BLD UNIT ID BPU: 0
BLOOD PRODUCT CODE: NORMAL
BPU ID: NORMAL
BUN SERPL-MCNC: 14 MG/DL (ref 7–30)
BUN SERPL-MCNC: 15 MG/DL (ref 7–30)
CALCIUM SERPL-MCNC: 8.8 MG/DL (ref 8.5–10.1)
CALCIUM SERPL-MCNC: 9.3 MG/DL (ref 8.5–10.1)
CHLORIDE SERPL-SCNC: 90 MMOL/L (ref 94–109)
CHLORIDE SERPL-SCNC: 92 MMOL/L (ref 94–109)
CO2 SERPL-SCNC: 27 MMOL/L (ref 20–32)
CO2 SERPL-SCNC: 29 MMOL/L (ref 20–32)
CREAT SERPL-MCNC: 1.11 MG/DL (ref 0.52–1.04)
CREAT SERPL-MCNC: 1.14 MG/DL (ref 0.52–1.04)
DIFFERENTIAL METHOD BLD: ABNORMAL
EOSINOPHIL # BLD AUTO: 0.2 10E9/L (ref 0–0.7)
EOSINOPHIL NFR BLD AUTO: 2.5 %
ERYTHROCYTE [DISTWIDTH] IN BLOOD BY AUTOMATED COUNT: 20.9 % (ref 10–15)
ERYTHROCYTE [DISTWIDTH] IN BLOOD BY AUTOMATED COUNT: 21.1 % (ref 10–15)
ETHANOL SERPL-MCNC: 0.12 G/DL
FOLATE SERPL-MCNC: 9.5 NG/ML
GFR SERPL CREATININE-BSD FRML MDRD: 52 ML/MIN/{1.73_M2}
GFR SERPL CREATININE-BSD FRML MDRD: 54 ML/MIN/{1.73_M2}
GGT SERPL-CCNC: 189 U/L (ref 0–40)
GLUCOSE SERPL-MCNC: 130 MG/DL (ref 70–99)
GLUCOSE SERPL-MCNC: 87 MG/DL (ref 70–99)
HCT VFR BLD AUTO: 15.5 % (ref 35–47)
HCT VFR BLD AUTO: 15.6 % (ref 35–47)
HGB BLD-MCNC: 4 G/DL (ref 11.7–15.7)
HGB BLD-MCNC: 4.1 G/DL (ref 11.7–15.7)
IMM GRANULOCYTES # BLD: 0 10E9/L (ref 0–0.4)
IMM GRANULOCYTES NFR BLD: 0.4 %
INR PPP: 1.08 (ref 0.86–1.14)
LIPASE SERPL-CCNC: 41 U/L (ref 73–393)
LYMPHOCYTES # BLD AUTO: 1.9 10E9/L (ref 0.8–5.3)
LYMPHOCYTES NFR BLD AUTO: 27.2 %
MCH RBC QN AUTO: 16.9 PG (ref 26.5–33)
MCH RBC QN AUTO: 17 PG (ref 26.5–33)
MCHC RBC AUTO-ENTMCNC: 25.8 G/DL (ref 31.5–36.5)
MCHC RBC AUTO-ENTMCNC: 26.3 G/DL (ref 31.5–36.5)
MCV RBC AUTO: 65 FL (ref 78–100)
MCV RBC AUTO: 65 FL (ref 78–100)
MONOCYTES # BLD AUTO: 0.7 10E9/L (ref 0–1.3)
MONOCYTES NFR BLD AUTO: 9.5 %
NEUTROPHILS # BLD AUTO: 4.3 10E9/L (ref 1.6–8.3)
NEUTROPHILS NFR BLD AUTO: 60 %
NRBC # BLD AUTO: 0 10*3/UL
NRBC BLD AUTO-RTO: 0 /100
PLATELET # BLD AUTO: 529 10E9/L (ref 150–450)
PLATELET # BLD AUTO: 571 10E9/L (ref 150–450)
POTASSIUM SERPL-SCNC: 2.9 MMOL/L (ref 3.4–5.3)
POTASSIUM SERPL-SCNC: 3.1 MMOL/L (ref 3.4–5.3)
PROT SERPL-MCNC: 7.5 G/DL (ref 6.8–8.8)
PROT SERPL-MCNC: 7.9 G/DL (ref 6.8–8.8)
RBC # BLD AUTO: 2.37 10E12/L (ref 3.8–5.2)
RBC # BLD AUTO: 2.41 10E12/L (ref 3.8–5.2)
SODIUM SERPL-SCNC: 126 MMOL/L (ref 133–144)
SODIUM SERPL-SCNC: 127 MMOL/L (ref 133–144)
TRANSFUSION STATUS PATIENT QL: NORMAL
TRANSFUSION STATUS PATIENT QL: NORMAL
TSH SERPL DL<=0.005 MIU/L-ACNC: 1.34 MU/L (ref 0.4–4)
VIT B12 SERPL-MCNC: 340 PG/ML (ref 193–986)
WBC # BLD AUTO: 7.1 10E9/L (ref 4–11)
WBC # BLD AUTO: 7.4 10E9/L (ref 4–11)

## 2019-04-23 PROCEDURE — 25800030 ZZH RX IP 258 OP 636: Performed by: FAMILY MEDICINE

## 2019-04-23 PROCEDURE — 86850 RBC ANTIBODY SCREEN: CPT | Performed by: FAMILY MEDICINE

## 2019-04-23 PROCEDURE — 83550 IRON BINDING TEST: CPT | Performed by: INTERNAL MEDICINE

## 2019-04-23 PROCEDURE — 99285 EMERGENCY DEPT VISIT HI MDM: CPT | Mod: 25 | Performed by: FAMILY MEDICINE

## 2019-04-23 PROCEDURE — 83540 ASSAY OF IRON: CPT | Performed by: INTERNAL MEDICINE

## 2019-04-23 PROCEDURE — 82607 VITAMIN B-12: CPT | Performed by: NURSE PRACTITIONER

## 2019-04-23 PROCEDURE — 25000128 H RX IP 250 OP 636: Performed by: FAMILY MEDICINE

## 2019-04-23 PROCEDURE — 99000 SPECIMEN HANDLING OFFICE-LAB: CPT | Performed by: NURSE PRACTITIONER

## 2019-04-23 PROCEDURE — 84425 ASSAY OF VITAMIN B-1: CPT | Mod: 90 | Performed by: NURSE PRACTITIONER

## 2019-04-23 PROCEDURE — 80053 COMPREHEN METABOLIC PANEL: CPT | Performed by: FAMILY MEDICINE

## 2019-04-23 PROCEDURE — 36430 TRANSFUSION BLD/BLD COMPNT: CPT | Performed by: FAMILY MEDICINE

## 2019-04-23 PROCEDURE — 36415 COLL VENOUS BLD VENIPUNCTURE: CPT | Performed by: NURSE PRACTITIONER

## 2019-04-23 PROCEDURE — 96361 HYDRATE IV INFUSION ADD-ON: CPT | Performed by: FAMILY MEDICINE

## 2019-04-23 PROCEDURE — 96375 TX/PRO/DX INJ NEW DRUG ADDON: CPT | Performed by: FAMILY MEDICINE

## 2019-04-23 PROCEDURE — 25000125 ZZHC RX 250: Performed by: FAMILY MEDICINE

## 2019-04-23 PROCEDURE — 86900 BLOOD TYPING SEROLOGIC ABO: CPT | Performed by: FAMILY MEDICINE

## 2019-04-23 PROCEDURE — 86923 COMPATIBILITY TEST ELECTRIC: CPT | Performed by: FAMILY MEDICINE

## 2019-04-23 PROCEDURE — 99214 OFFICE O/P EST MOD 30 MIN: CPT | Performed by: NURSE PRACTITIONER

## 2019-04-23 PROCEDURE — 12000000 ZZH R&B MED SURG/OB

## 2019-04-23 PROCEDURE — 83690 ASSAY OF LIPASE: CPT | Performed by: FAMILY MEDICINE

## 2019-04-23 PROCEDURE — P9016 RBC LEUKOCYTES REDUCED: HCPCS | Performed by: FAMILY MEDICINE

## 2019-04-23 PROCEDURE — 84443 ASSAY THYROID STIM HORMONE: CPT | Performed by: NURSE PRACTITIONER

## 2019-04-23 PROCEDURE — 85025 COMPLETE CBC W/AUTO DIFF WBC: CPT | Performed by: FAMILY MEDICINE

## 2019-04-23 PROCEDURE — 86901 BLOOD TYPING SEROLOGIC RH(D): CPT | Performed by: FAMILY MEDICINE

## 2019-04-23 PROCEDURE — 80053 COMPREHEN METABOLIC PANEL: CPT | Performed by: NURSE PRACTITIONER

## 2019-04-23 PROCEDURE — 93005 ELECTROCARDIOGRAM TRACING: CPT | Performed by: FAMILY MEDICINE

## 2019-04-23 PROCEDURE — C9113 INJ PANTOPRAZOLE SODIUM, VIA: HCPCS | Performed by: FAMILY MEDICINE

## 2019-04-23 PROCEDURE — 82746 ASSAY OF FOLIC ACID SERUM: CPT | Performed by: NURSE PRACTITIONER

## 2019-04-23 PROCEDURE — 80320 DRUG SCREEN QUANTALCOHOLS: CPT | Performed by: FAMILY MEDICINE

## 2019-04-23 PROCEDURE — 85027 COMPLETE CBC AUTOMATED: CPT | Performed by: NURSE PRACTITIONER

## 2019-04-23 PROCEDURE — 85610 PROTHROMBIN TIME: CPT | Performed by: FAMILY MEDICINE

## 2019-04-23 PROCEDURE — 96365 THER/PROPH/DIAG IV INF INIT: CPT | Performed by: FAMILY MEDICINE

## 2019-04-23 PROCEDURE — 82977 ASSAY OF GGT: CPT | Performed by: NURSE PRACTITIONER

## 2019-04-23 PROCEDURE — 85730 THROMBOPLASTIN TIME PARTIAL: CPT | Performed by: FAMILY MEDICINE

## 2019-04-23 RX ORDER — AMLODIPINE BESYLATE 5 MG/1
5 TABLET ORAL DAILY
Refills: 3 | Status: ON HOLD | COMMUNITY
Start: 2019-02-05 | End: 2019-04-25

## 2019-04-23 RX ORDER — SODIUM CHLORIDE, SODIUM LACTATE, POTASSIUM CHLORIDE, CALCIUM CHLORIDE 600; 310; 30; 20 MG/100ML; MG/100ML; MG/100ML; MG/100ML
1000 INJECTION, SOLUTION INTRAVENOUS CONTINUOUS
Status: DISCONTINUED | OUTPATIENT
Start: 2019-04-23 | End: 2019-04-24

## 2019-04-23 RX ORDER — FUROSEMIDE 20 MG
20 TABLET ORAL DAILY PRN
Refills: 0 | COMMUNITY
Start: 2019-03-26 | End: 2019-07-23 | Stop reason: ALTCHOICE

## 2019-04-23 RX ORDER — CALCIUM CARBONATE 500 MG/1
2 TABLET, CHEWABLE ORAL 3 TIMES DAILY
COMMUNITY

## 2019-04-23 RX ORDER — FOLIC ACID 1 MG/1
1 TABLET ORAL DAILY
Status: DISCONTINUED | OUTPATIENT
Start: 2019-04-24 | End: 2019-04-25 | Stop reason: HOSPADM

## 2019-04-23 RX ORDER — DIPHENHYDRAMINE HYDROCHLORIDE 50 MG/ML
50 INJECTION INTRAMUSCULAR; INTRAVENOUS ONCE
Status: COMPLETED | OUTPATIENT
Start: 2019-04-23 | End: 2019-04-23

## 2019-04-23 RX ORDER — OLANZAPINE 5 MG/1
5 TABLET, ORALLY DISINTEGRATING ORAL EVERY 6 HOURS PRN
Status: DISCONTINUED | OUTPATIENT
Start: 2019-04-23 | End: 2019-04-25 | Stop reason: HOSPADM

## 2019-04-23 RX ORDER — DIAZEPAM 10 MG/2ML
5-10 INJECTION, SOLUTION INTRAMUSCULAR; INTRAVENOUS EVERY 30 MIN PRN
Status: DISCONTINUED | OUTPATIENT
Start: 2019-04-23 | End: 2019-04-23 | Stop reason: RX

## 2019-04-23 RX ORDER — ONDANSETRON 4 MG/1
4 TABLET, ORALLY DISINTEGRATING ORAL EVERY 6 HOURS PRN
Status: DISCONTINUED | OUTPATIENT
Start: 2019-04-23 | End: 2019-04-25 | Stop reason: HOSPADM

## 2019-04-23 RX ORDER — POTASSIUM CL/LIDO/0.9 % NACL 10MEQ/0.1L
10 INTRAVENOUS SOLUTION, PIGGYBACK (ML) INTRAVENOUS ONCE
Status: COMPLETED | OUTPATIENT
Start: 2019-04-23 | End: 2019-04-23

## 2019-04-23 RX ORDER — MAGNESIUM HYDROXIDE/ALUMINUM HYDROXICE/SIMETHICONE 120; 1200; 1200 MG/30ML; MG/30ML; MG/30ML
30 SUSPENSION ORAL 2 TIMES DAILY
COMMUNITY

## 2019-04-23 RX ORDER — NALOXONE HYDROCHLORIDE 0.4 MG/ML
.1-.4 INJECTION, SOLUTION INTRAMUSCULAR; INTRAVENOUS; SUBCUTANEOUS
Status: DISCONTINUED | OUTPATIENT
Start: 2019-04-23 | End: 2019-04-25 | Stop reason: HOSPADM

## 2019-04-23 RX ORDER — DIAZEPAM 5 MG
10 TABLET ORAL EVERY 30 MIN PRN
Status: DISCONTINUED | OUTPATIENT
Start: 2019-04-23 | End: 2019-04-25 | Stop reason: HOSPADM

## 2019-04-23 RX ORDER — ONDANSETRON 2 MG/ML
4 INJECTION INTRAMUSCULAR; INTRAVENOUS EVERY 6 HOURS PRN
Status: DISCONTINUED | OUTPATIENT
Start: 2019-04-23 | End: 2019-04-25 | Stop reason: HOSPADM

## 2019-04-23 RX ORDER — SODIUM CHLORIDE 9 MG/ML
INJECTION, SOLUTION INTRAVENOUS CONTINUOUS
Status: DISCONTINUED | OUTPATIENT
Start: 2019-04-23 | End: 2019-04-24

## 2019-04-23 RX ORDER — POTASSIUM CL/LIDO/0.9 % NACL 10MEQ/0.1L
10 INTRAVENOUS SOLUTION, PIGGYBACK (ML) INTRAVENOUS
Status: DISCONTINUED | OUTPATIENT
Start: 2019-04-23 | End: 2019-04-24

## 2019-04-23 RX ORDER — LANOLIN ALCOHOL/MO/W.PET/CERES
100 CREAM (GRAM) TOPICAL DAILY
Status: DISCONTINUED | OUTPATIENT
Start: 2019-04-24 | End: 2019-04-25 | Stop reason: HOSPADM

## 2019-04-23 RX ORDER — MULTIPLE VITAMINS W/ MINERALS TAB 9MG-400MCG
1 TAB ORAL DAILY
Status: DISCONTINUED | OUTPATIENT
Start: 2019-04-24 | End: 2019-04-25 | Stop reason: HOSPADM

## 2019-04-23 RX ORDER — HYDROMORPHONE HYDROCHLORIDE 1 MG/ML
0.2 INJECTION, SOLUTION INTRAMUSCULAR; INTRAVENOUS; SUBCUTANEOUS
Status: DISCONTINUED | OUTPATIENT
Start: 2019-04-23 | End: 2019-04-25 | Stop reason: HOSPADM

## 2019-04-23 RX ADMIN — SODIUM CHLORIDE, POTASSIUM CHLORIDE, SODIUM LACTATE AND CALCIUM CHLORIDE 1000 ML: 600; 310; 30; 20 INJECTION, SOLUTION INTRAVENOUS at 18:12

## 2019-04-23 RX ADMIN — PANTOPRAZOLE SODIUM 40 MG: 40 INJECTION, POWDER, FOR SOLUTION INTRAVENOUS at 18:12

## 2019-04-23 RX ADMIN — DIPHENHYDRAMINE HYDROCHLORIDE 50 MG: 50 INJECTION, SOLUTION INTRAMUSCULAR; INTRAVENOUS at 21:01

## 2019-04-23 RX ADMIN — Medication 10 MEQ: at 18:50

## 2019-04-23 RX ADMIN — FOLIC ACID: 5 INJECTION, SOLUTION INTRAMUSCULAR; INTRAVENOUS; SUBCUTANEOUS at 23:06

## 2019-04-23 SDOH — ECONOMIC STABILITY - INCOME SECURITY: FINANCIAL INSECURITY: Z59.86

## 2019-04-23 ASSESSMENT — MIFFLIN-ST. JEOR
SCORE: 1244.46
SCORE: 1203.19

## 2019-04-23 NOTE — TELEPHONE ENCOUNTER
Spoke with Jeremy, patient's SO as she is not answering the phone for multiple attempts. Instructed Jeremy to take Mihaela to the ER for further evaluation for low hemoglobin. Jeremy states he is at work but thinks he can drive to go pick her up and take her to the ER now. Instructed to call us back for any issues getting Mihaela to the ER. RON Lopez CNP
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
## 2019-04-23 NOTE — ED NOTES
Pt here with a low hemoglobin, states she was in the clinic today and they called her and told her to come in. Pt states that she has been experiencing dizziness, low blood pressures and has had syncopal episodes for the past 2 months. Pt denies any blood in stool, no vomiting. Pt is not on any blood thinners. Pt has been feeling fatigued, has edema in left foot but has not been taking her lasix regularly.
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
## 2019-04-23 NOTE — ED NOTES
..DATE:  4/23/2019   TIME OF RECEIPT FROM LAB:  1800  LAB TEST:  hgb  LAB VALUE:  4.1  TIME LAB VALUE REPORTED TO PROVIDER:   180

## 2019-04-23 NOTE — PATIENT INSTRUCTIONS
CERAVAE MOISTURIZING LOTION EVERY DAY.   STOP THE AMLODIPINE AND THE FUROSEMIDE (LASIX).   SCHEDULE THE ULTRASOUND OF THE HEART. Please call [facility] Imaging Department to schedule your imaging [phone].  SCHEDULE AN APPOINTMENT WITH GI.      Patient Education     Understanding Cirrhosis    Cirrhosis is a chronic (lifelong) liver problem. It results from damaged and scarred liver tissue. Cirrhosis can t be cured. But it can be treated. Your healthcare provider can tell you more.  The liver  The liver is a large organ in the upper right part of the belly. A healthy liver metabolizes proteins, carbohydrates, and fats. It makes a digestive fluid called bile and removes toxins from the blood. The liver is also involved in the blood-clotting process.  When you have cirrhosis  When you have cirrhosis, your liver becomes damaged and scarred. The liver doesn t function as it should. In some cases, cirrhosis can lead to liver failure. If it does, your healthcare provider will tell you whether you may need a liver transplant. You can slow down the progression of cirrhosis if you stop all alcohol use. Also, if you have metabolic problems like being overweight, diabetes, high blood pressure, or high cholesterol and triglycerides, you can also slow down the progression of cirrhosis by trying to improve those diseases.   Causes of cirrhosis  Cirrhosis causes include the following:    Alcohol use    Viral liver infections, such as hepatitis    Chronic bile duct blockage    Certain inherited diseases that can result in too much copper or iron being stored in the liver    Certain medicines    Nonalcoholic fatty liver disease    Autoimmune disease  Common signs and symptoms  Typical cirrhosis signs and symptoms include the following:    Fatigue, weakness, and lack of appetite    Vomiting with or without blood    Weight loss or weight gain    Yellowish skin and eyes (jaundice)    Itching    Swollen belly and  legs    Intestinal bleeding    Easy bruising of the skin    Dilated veins in the esophagus and stomach    Poor mental function  Date Last Reviewed: 6/1/2016 2000-2018 [employer]. [address] [place] [address], [place] [address]. All rights reserved. This information is not intended as a substitute for professional medical care. Always follow your healthcare professional's instructions.           Patient Education     Low Blood Pressure (All Causes)  A blood pressure reading is made up of 2 numbers There is a top number over a bottom number. The top number is the systolic pressure. The bottom number is the diastolic pressure. A normal blood pressure is a systolic pressure less than 120 over a diastolic pressure less than 80. Low blood pressure (hypotension) is a blood pressure that is less than what is normal for you. Low blood pressure can cause dizziness, lightheadedness, or fainting.  Some medicines can cause low blood pressure. They include:    High blood pressure pills    Water pills (diuretics)    Some heart medicines    Some antidepressants    Pain, anxiety, sedative, and sleeping medicines  Other causes include:    Dehydration, severe infection, or fever    Blood loss, such as bleeding from the stomach or intestines.    Heart failure    Change in heart rate or rhythm (arrhythmia)    A drop in blood pressure from a sudden change in body position, from lying down to standing (orthostatic hypotension)    Alcohol or drug intoxication    Neurological diseases that impair the autonomic nervous system  Treatment will depend on what is causing your low blood pressure.  Home care  Follow these guidelines when caring for yourself at home:    Rest until your symptoms get better.    Keep a record of your symptoms and what you were doing when they occurred. Bring the record with you to your next appointment.    Be aware of how quickly your blood pressure drops when you become dehydrated, spend a lot of time in  the sun, or have low blood sugar. Take measures to prevent blood pressure drops at these times.    Follow the treatment plan described by your healthcare provider.  Follow-up care  Follow up with your healthcare provider, or as advised.  When to seek medical advice  Call your healthcare provider right away if any of these occur:    Dizziness, lightheadedness, or fainting    Black or red color in your stools or vomit    Shortness of breath or difficulty breathing    Chest, shoulder, arm, neck, or upper back pain    Abdominal pain    Diarrhea or vomiting that doesn t go away    You aren t able to eat or drink    Fever of 100.4 F (38 C) or higher, or as directed by your healthcare provider    Burning when you urinate    Foul-smelling urine  Date Last Reviewed: 12/1/2016 2000-2018 [employer]. [address] [place] [address], [place] [address]. All rights reserved. This information is not intended as a substitute for professional medical care. Always follow your healthcare professional's instructions.

## 2019-04-23 NOTE — TELEPHONE ENCOUNTER
Unable to reach pt.  LM @ home,cell and s/o cell phone to call clinic/RN for lab results and plan.  Spoke with JOSÉ MIGUEL Vanessa and pt is to be driven to the ER for her Hgb of 4.0.  Gretta

## 2019-04-23 NOTE — TELEPHONE ENCOUNTER
[facility] called with Critical Lab Result.  Hemoglobin of 4.0.  This info given to JOSÉ MIGUEL Vanessa.  Advise.  Gretta

## 2019-04-24 ENCOUNTER — ANESTHESIA (OUTPATIENT)
Dept: GASTROENTEROLOGY | Facility: CLINIC | Age: 59
DRG: 378 | End: 2019-04-24
Payer: COMMERCIAL

## 2019-04-24 ENCOUNTER — ANESTHESIA EVENT (OUTPATIENT)
Dept: GASTROENTEROLOGY | Facility: CLINIC | Age: 59
DRG: 378 | End: 2019-04-24
Payer: COMMERCIAL

## 2019-04-24 PROBLEM — G89.4 CHRONIC PAIN SYNDROME: Chronic | Status: ACTIVE | Noted: 2019-04-24

## 2019-04-24 PROBLEM — G89.4 CHRONIC PAIN SYNDROME: Status: ACTIVE | Noted: 2019-04-24

## 2019-04-24 LAB
ABO + RH BLD: NORMAL
ABO + RH BLD: NORMAL
ANION GAP SERPL CALCULATED.3IONS-SCNC: 8 MMOL/L (ref 3–14)
BASOPHILS # BLD AUTO: 0.1 10E9/L (ref 0–0.2)
BASOPHILS NFR BLD AUTO: 1 %
BLD GP AB SCN SERPL QL: NORMAL
BLD PROD TYP BPU: NORMAL
BLD UNIT ID BPU: 0
BLD UNIT ID BPU: 0
BLOOD BANK CMNT PATIENT-IMP: NORMAL
BLOOD PRODUCT CODE: NORMAL
BLOOD PRODUCT CODE: NORMAL
BPU ID: NORMAL
BPU ID: NORMAL
BUN SERPL-MCNC: 11 MG/DL (ref 7–30)
CALCIUM SERPL-MCNC: 8.1 MG/DL (ref 8.5–10.1)
CHLORIDE SERPL-SCNC: 102 MMOL/L (ref 94–109)
CO2 SERPL-SCNC: 27 MMOL/L (ref 20–32)
CREAT SERPL-MCNC: 0.96 MG/DL (ref 0.52–1.04)
DIFFERENTIAL METHOD BLD: ABNORMAL
EOSINOPHIL # BLD AUTO: 0.1 10E9/L (ref 0–0.7)
EOSINOPHIL NFR BLD AUTO: 2 %
ERYTHROCYTE [DISTWIDTH] IN BLOOD BY AUTOMATED COUNT: 25.4 % (ref 10–15)
GFR SERPL CREATININE-BSD FRML MDRD: 64 ML/MIN/{1.73_M2}
GLUCOSE SERPL-MCNC: 85 MG/DL (ref 70–99)
HCT VFR BLD AUTO: 21.7 % (ref 35–47)
HGB BLD-MCNC: 6.5 G/DL (ref 11.7–15.7)
HGB BLD-MCNC: 8.1 G/DL (ref 11.7–15.7)
HGB BLD-MCNC: 8.2 G/DL (ref 11.7–15.7)
IMM GRANULOCYTES # BLD: 0 10E9/L (ref 0–0.4)
IMM GRANULOCYTES NFR BLD: 0.4 %
IRON SATN MFR SERPL: 3 % (ref 15–46)
IRON SERPL-MCNC: 13 UG/DL (ref 35–180)
LYMPHOCYTES # BLD AUTO: 1.2 10E9/L (ref 0.8–5.3)
LYMPHOCYTES NFR BLD AUTO: 16.5 %
MAGNESIUM SERPL-MCNC: 1.8 MG/DL (ref 1.6–2.3)
MCH RBC QN AUTO: 21.8 PG (ref 26.5–33)
MCHC RBC AUTO-ENTMCNC: 30 G/DL (ref 31.5–36.5)
MCV RBC AUTO: 73 FL (ref 78–100)
MONOCYTES # BLD AUTO: 0.7 10E9/L (ref 0–1.3)
MONOCYTES NFR BLD AUTO: 10.5 %
NEUTROPHILS # BLD AUTO: 4.9 10E9/L (ref 1.6–8.3)
NEUTROPHILS NFR BLD AUTO: 69.6 %
NRBC # BLD AUTO: 0 10*3/UL
NRBC BLD AUTO-RTO: 0 /100
NUM BPU REQUESTED: 3
PLATELET # BLD AUTO: 387 10E9/L (ref 150–450)
POTASSIUM SERPL-SCNC: 3.2 MMOL/L (ref 3.4–5.3)
POTASSIUM SERPL-SCNC: 3.2 MMOL/L (ref 3.4–5.3)
POTASSIUM SERPL-SCNC: 3.8 MMOL/L (ref 3.4–5.3)
RBC # BLD AUTO: 2.98 10E12/L (ref 3.8–5.2)
SODIUM SERPL-SCNC: 134 MMOL/L (ref 133–144)
SODIUM SERPL-SCNC: 135 MMOL/L (ref 133–144)
SODIUM SERPL-SCNC: 137 MMOL/L (ref 133–144)
SPECIMEN EXP DATE BLD: NORMAL
TIBC SERPL-MCNC: 466 UG/DL (ref 240–430)
TRANSFUSION STATUS PATIENT QL: NORMAL
UPPER GI ENDOSCOPY: NORMAL
WBC # BLD AUTO: 7 10E9/L (ref 4–11)

## 2019-04-24 PROCEDURE — 84295 ASSAY OF SERUM SODIUM: CPT | Performed by: INTERNAL MEDICINE

## 2019-04-24 PROCEDURE — 99223 1ST HOSP IP/OBS HIGH 75: CPT | Mod: AI | Performed by: INTERNAL MEDICINE

## 2019-04-24 PROCEDURE — 80048 BASIC METABOLIC PNL TOTAL CA: CPT | Performed by: INTERNAL MEDICINE

## 2019-04-24 PROCEDURE — 25000132 ZZH RX MED GY IP 250 OP 250 PS 637: Performed by: SURGERY

## 2019-04-24 PROCEDURE — 36415 COLL VENOUS BLD VENIPUNCTURE: CPT | Performed by: FAMILY MEDICINE

## 2019-04-24 PROCEDURE — 12000000 ZZH R&B MED SURG/OB

## 2019-04-24 PROCEDURE — 0DB78ZX EXCISION OF STOMACH, PYLORUS, VIA NATURAL OR ARTIFICIAL OPENING ENDOSCOPIC, DIAGNOSTIC: ICD-10-PCS | Performed by: SURGERY

## 2019-04-24 PROCEDURE — 37000008 ZZH ANESTHESIA TECHNICAL FEE, 1ST 30 MIN: Performed by: SURGERY

## 2019-04-24 PROCEDURE — 25000128 H RX IP 250 OP 636: Performed by: INTERNAL MEDICINE

## 2019-04-24 PROCEDURE — 88305 TISSUE EXAM BY PATHOLOGIST: CPT | Mod: 26 | Performed by: SURGERY

## 2019-04-24 PROCEDURE — 25000132 ZZH RX MED GY IP 250 OP 250 PS 637: Performed by: FAMILY MEDICINE

## 2019-04-24 PROCEDURE — 25000125 ZZHC RX 250: Performed by: NURSE ANESTHETIST, CERTIFIED REGISTERED

## 2019-04-24 PROCEDURE — 0DB68ZZ EXCISION OF STOMACH, VIA NATURAL OR ARTIFICIAL OPENING ENDOSCOPIC: ICD-10-PCS | Performed by: SURGERY

## 2019-04-24 PROCEDURE — 43239 EGD BIOPSY SINGLE/MULTIPLE: CPT | Performed by: SURGERY

## 2019-04-24 PROCEDURE — 25800030 ZZH RX IP 258 OP 636: Performed by: NURSE ANESTHETIST, CERTIFIED REGISTERED

## 2019-04-24 PROCEDURE — 25000125 ZZHC RX 250: Performed by: SURGERY

## 2019-04-24 PROCEDURE — 84132 ASSAY OF SERUM POTASSIUM: CPT | Performed by: FAMILY MEDICINE

## 2019-04-24 PROCEDURE — 25000132 ZZH RX MED GY IP 250 OP 250 PS 637: Performed by: INTERNAL MEDICINE

## 2019-04-24 PROCEDURE — P9016 RBC LEUKOCYTES REDUCED: HCPCS | Performed by: FAMILY MEDICINE

## 2019-04-24 PROCEDURE — C9113 INJ PANTOPRAZOLE SODIUM, VIA: HCPCS | Performed by: SURGERY

## 2019-04-24 PROCEDURE — 25000128 H RX IP 250 OP 636: Performed by: NURSE ANESTHETIST, CERTIFIED REGISTERED

## 2019-04-24 PROCEDURE — 85025 COMPLETE CBC W/AUTO DIFF WBC: CPT | Performed by: FAMILY MEDICINE

## 2019-04-24 PROCEDURE — 85018 HEMOGLOBIN: CPT | Performed by: INTERNAL MEDICINE

## 2019-04-24 PROCEDURE — 88305 TISSUE EXAM BY PATHOLOGIST: CPT | Performed by: SURGERY

## 2019-04-24 PROCEDURE — 25000128 H RX IP 250 OP 636: Performed by: SURGERY

## 2019-04-24 PROCEDURE — 84132 ASSAY OF SERUM POTASSIUM: CPT | Performed by: INTERNAL MEDICINE

## 2019-04-24 PROCEDURE — 83735 ASSAY OF MAGNESIUM: CPT | Performed by: INTERNAL MEDICINE

## 2019-04-24 PROCEDURE — 36415 COLL VENOUS BLD VENIPUNCTURE: CPT | Performed by: INTERNAL MEDICINE

## 2019-04-24 RX ORDER — LIDOCAINE HYDROCHLORIDE 10 MG/ML
INJECTION, SOLUTION INFILTRATION; PERINEURAL PRN
Status: DISCONTINUED | OUTPATIENT
Start: 2019-04-24 | End: 2019-04-24

## 2019-04-24 RX ORDER — POTASSIUM CHLORIDE 1500 MG/1
20-40 TABLET, EXTENDED RELEASE ORAL
Status: DISCONTINUED | OUTPATIENT
Start: 2019-04-24 | End: 2019-04-25 | Stop reason: HOSPADM

## 2019-04-24 RX ORDER — BUSPIRONE HYDROCHLORIDE 15 MG/1
30 TABLET ORAL 2 TIMES DAILY
Status: DISCONTINUED | OUTPATIENT
Start: 2019-04-24 | End: 2019-04-24

## 2019-04-24 RX ORDER — HYDROXYZINE HYDROCHLORIDE 25 MG/1
25 TABLET, FILM COATED ORAL
Status: DISCONTINUED | OUTPATIENT
Start: 2019-04-24 | End: 2019-04-25 | Stop reason: HOSPADM

## 2019-04-24 RX ORDER — POTASSIUM CL/LIDO/0.9 % NACL 10MEQ/0.1L
10 INTRAVENOUS SOLUTION, PIGGYBACK (ML) INTRAVENOUS
Status: DISCONTINUED | OUTPATIENT
Start: 2019-04-24 | End: 2019-04-25 | Stop reason: HOSPADM

## 2019-04-24 RX ORDER — LIDOCAINE 40 MG/G
CREAM TOPICAL
Status: DISCONTINUED | OUTPATIENT
Start: 2019-04-24 | End: 2019-04-24 | Stop reason: HOSPADM

## 2019-04-24 RX ORDER — ATENOLOL 25 MG/1
25 TABLET ORAL DAILY
Status: DISCONTINUED | OUTPATIENT
Start: 2019-04-24 | End: 2019-04-25 | Stop reason: HOSPADM

## 2019-04-24 RX ORDER — POTASSIUM CHLORIDE 7.45 MG/ML
10 INJECTION INTRAVENOUS
Status: DISCONTINUED | OUTPATIENT
Start: 2019-04-24 | End: 2019-04-25 | Stop reason: HOSPADM

## 2019-04-24 RX ORDER — GLYCOPYRROLATE 0.2 MG/ML
INJECTION, SOLUTION INTRAMUSCULAR; INTRAVENOUS PRN
Status: DISCONTINUED | OUTPATIENT
Start: 2019-04-24 | End: 2019-04-24

## 2019-04-24 RX ORDER — POTASSIUM CHLORIDE 1.5 G/1.58G
20-40 POWDER, FOR SOLUTION ORAL
Status: DISCONTINUED | OUTPATIENT
Start: 2019-04-24 | End: 2019-04-25 | Stop reason: HOSPADM

## 2019-04-24 RX ORDER — PROPOFOL 10 MG/ML
INJECTION, EMULSION INTRAVENOUS PRN
Status: DISCONTINUED | OUTPATIENT
Start: 2019-04-24 | End: 2019-04-24

## 2019-04-24 RX ORDER — POTASSIUM CHLORIDE 29.8 MG/ML
20 INJECTION INTRAVENOUS
Status: DISCONTINUED | OUTPATIENT
Start: 2019-04-24 | End: 2019-04-24 | Stop reason: CLARIF

## 2019-04-24 RX ORDER — SODIUM CHLORIDE, SODIUM LACTATE, POTASSIUM CHLORIDE, CALCIUM CHLORIDE 600; 310; 30; 20 MG/100ML; MG/100ML; MG/100ML; MG/100ML
INJECTION, SOLUTION INTRAVENOUS CONTINUOUS PRN
Status: DISCONTINUED | OUTPATIENT
Start: 2019-04-24 | End: 2019-04-24

## 2019-04-24 RX ORDER — BUSPIRONE HYDROCHLORIDE 15 MG/1
30 TABLET ORAL 2 TIMES DAILY
Status: DISCONTINUED | OUTPATIENT
Start: 2019-04-24 | End: 2019-04-25 | Stop reason: HOSPADM

## 2019-04-24 RX ADMIN — POTASSIUM CHLORIDE 40 MEQ: 1.5 POWDER, FOR SOLUTION ORAL at 06:28

## 2019-04-24 RX ADMIN — SODIUM CHLORIDE, POTASSIUM CHLORIDE, SODIUM LACTATE AND CALCIUM CHLORIDE: 600; 310; 30; 20 INJECTION, SOLUTION INTRAVENOUS at 13:40

## 2019-04-24 RX ADMIN — PROPOFOL 150 MG: 10 INJECTION, EMULSION INTRAVENOUS at 14:53

## 2019-04-24 RX ADMIN — ATENOLOL 25 MG: 25 TABLET ORAL at 16:00

## 2019-04-24 RX ADMIN — POTASSIUM CHLORIDE 20 MEQ: 1500 TABLET, EXTENDED RELEASE ORAL at 08:39

## 2019-04-24 RX ADMIN — PROPOFOL 100 MG: 10 INJECTION, EMULSION INTRAVENOUS at 14:58

## 2019-04-24 RX ADMIN — FOLIC ACID 1 MG: 1 TABLET ORAL at 08:39

## 2019-04-24 RX ADMIN — LIDOCAINE HYDROCHLORIDE 50 MG: 10 INJECTION, SOLUTION INFILTRATION; PERINEURAL at 14:53

## 2019-04-24 RX ADMIN — DEXTROSE MONOHYDRATE 1000 ML: 50 INJECTION, SOLUTION INTRAVENOUS at 12:58

## 2019-04-24 RX ADMIN — BUSPIRONE HYDROCHLORIDE 30 MG: 15 TABLET ORAL at 02:56

## 2019-04-24 RX ADMIN — PROPOFOL 100 MG: 10 INJECTION, EMULSION INTRAVENOUS at 14:55

## 2019-04-24 RX ADMIN — DEXTROSE MONOHYDRATE 1000 ML: 50 INJECTION, SOLUTION INTRAVENOUS at 10:08

## 2019-04-24 RX ADMIN — GLYCOPYRROLATE 0.2 MG: 0.2 INJECTION, SOLUTION INTRAMUSCULAR; INTRAVENOUS at 14:53

## 2019-04-24 RX ADMIN — PANTOPRAZOLE SODIUM 40 MG: 40 INJECTION, POWDER, FOR SOLUTION INTRAVENOUS at 15:59

## 2019-04-24 RX ADMIN — Medication 100 MG: at 08:39

## 2019-04-24 RX ADMIN — BUSPIRONE HYDROCHLORIDE 30 MG: 15 TABLET ORAL at 08:39

## 2019-04-24 RX ADMIN — BUSPIRONE HYDROCHLORIDE 30 MG: 15 TABLET ORAL at 20:39

## 2019-04-24 RX ADMIN — HYDROXYZINE HYDROCHLORIDE 25 MG: 25 TABLET ORAL at 02:57

## 2019-04-24 RX ADMIN — MULTIPLE VITAMINS W/ MINERALS TAB 1 TABLET: TAB at 08:38

## 2019-04-24 ASSESSMENT — ACTIVITIES OF DAILY LIVING (ADL)
ADLS_ACUITY_SCORE: 15
ADLS_ACUITY_SCORE: 13
ADLS_ACUITY_SCORE: 15
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
## 2019-04-24 NOTE — PROGRESS NOTES
Patient refused atenolol this morning and stated her home  Told her not to take this medication as her pressures have been low.  She states she had not been taking this at home prior to admission.  Anxious person and rambling about her medications, she was adiment about not taking medication.  Followed up with patient now about the BP medication and her story is inconsistent.  Patient claims her new doctor yesterday told her not to take this medication, (which would have been the MD she seen in the clinic for a check-up).  Informed anesthesia that patient did not get her bet-blocker.  Since EGD is at 1400, per anesthesia, we will not give now and reschedule for later.

## 2019-04-24 NOTE — ANESTHESIA CARE TRANSFER NOTE
Patient: Mihaela Vance    Procedure(s):  ESOPHAGOGASTRODUODENOSCOPY (EGD)    Diagnosis: GI bleed  Diagnosis Additional Information: No value filed.    Anesthesia Type:   MAC     Note:  Airway :Face Mask  Patient transferred to:Phase II  Handoff Report: Identifed the Patient, Identified the Reponsible Provider, Reviewed the pertinent medical history, Discussed the surgical course, Reviewed Intra-OP anesthesia mangement and issues during anesthesia, Set expectations for post-procedure period and Allowed opportunity for questions and acknowledgement of understanding      Vitals: (Last set prior to Anesthesia Care Transfer)    CRNA VITALS  4/24/2019 1436 - 4/24/2019 1506      4/24/2019             Pulse:  89    SpO2:  100 %                Electronically Signed By: RON Allen CRNA  April 24, 2019  3:06 PM
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
## 2019-04-24 NOTE — PROGRESS NOTES
"[facility] [place]    Patient admitted to room 2215 at approximately 2245 via wheel chair from emergency room. Patient was accompanied by transport tech.     Verbal SBAR report received from [employer] prior to patient arrival.     Patient ambulated to bed with stand-by assist. Patient alert and oriented X 3. The patient is not having any pain. 0-10 Pain Scale: 5. Admission vital signs: Blood pressure 131/78, pulse 90, temperature 99  F (37.2  C), temperature source Oral, resp. rate 18, height 1.727 m (5' 8\"), weight 62.6 kg (138 lb), last menstrual period 09/16/2006, SpO2 100 %, not currently breastfeeding. Patient was oriented to plan of care, call light, bed controls, tv, telephone, bathroom and visiting hours.     Risk Assessment    The following safety risks were identified during admission: fall. Yellow risk band applied: YES.     Skin Initial Assessment    This writer admitted this patient and completed a full skin assessment and Hugh score in the Adult PCS flowsheet. Appropriate interventions initiated as needed.     Secondary skin check completed by PUNEET Arteaga RN.         Catie Reed RN    "

## 2019-04-24 NOTE — PLAN OF CARE
Potassium replaced, WNL post protocol.  Up with SBA.  Patient left unit around 1330 for EGD, report given to Marjorie at that time.  Has been NPO this shift.  IV fluids infusing, no pain, no signs of bleeding and she has not had a BM today.  Is an anxious person and talks excessively with flight of ideas.  CIWAA completed every 4 hours and scoring 2-3.  Banana bag infusing between special orders.

## 2019-04-24 NOTE — PLAN OF CARE
Alert and oriented. Appears slightly anxious. Received 2 units of blood with no adverse effects. Ciwa score of 1 and 3.   Patient is itchy all over,and has scattered scabs from received Hydroxyzine, which helped some.   She states she has pins and needle  in leg.  Banana bag infusing.    Up with assist of one, unsteady on feet.   Bed alarm on for safety.

## 2019-04-24 NOTE — PROGRESS NOTES
[facility]    Medicine Progress Note - Hospitalist Service       Date of Admission:  4/23/2019  Assessment & Plan      Mihaela Vance is a 59 year old female with past medical history significant for reported cirrhosis, anemia, hypertension, panic disorder, anxiety who now presents on 4/23/2019 with progressive weakness and falls and was found to have a hemoglobin of 4.0      GI bleed, chronic    Anemia, microcytic   Suspect FLORI anemia from chronic GI bleeding no overt bleeding episodes noted. Progressive weakness, dizziness and falls. Hemoglobin 4.0 in clinic and sent to ED. . Has been having increasing heartburn and reflux lately   - Received 2 units PRBC and recheck hemoglobin 8.1.    - Upper endoscopy - showed Esophagitis, LA Grade C, Gastritis, Gastric polyps. Test had to be prematurely aborted due to hypoxemia (?related to gagging or apneas)    Recommend   -Repeat upper endoscopy in 6 weeks for repeat biopsies.    - PPI therapy daily until then.    - Abstain from alcohol and NSAIDs completely.  - May as well do colonoscopy inn 6 weeks, last was 9 years ago    Hyponatremia  Has had mild hyponatremia on Labs since 2017. Na initially 126, sudden increase to 137 this evening  Received D%W 1000 ml with recheck Na 135  - Receiving 2nd 1000cc bolus D%W, recheck na. Goal < 135 tonight      Suspect alcohol abuse   Patient evasive regarding use. BAL 0.12 on admission. Denies withdrawal history but does not seem reliable    - CIWA has not been needed      ?Cirrhosis of liver (H)   Previous diagnosis uncertain veracity. Imaging suggests she has fatty liver. LFTS, Platelets and INR normal. No hepatomegaly. No asterixis or hepatic encephalopathy evident.       Panic disorder   - continue Buspar      Hypertension, goal below 140/90   Patient's BP has been running low for her lately.    - continued atenolol, holding amlodipine           Diet: Advance Diet as Tolerated: Clear Liquid Diet    DVT  Prophylaxis: Low Risk/Ambulatory with no VTE prophylaxis indicated  Soto Catheter: not present  Code Status: not adressed    Disposition Plan   Expected discharge: Tomorrow, recommended to prior living arrangement once hemoglobin stable.  Entered: Luis Cuello MD 04/24/2019, 4:15 PM       The patient's care was discussed with the Bedside Nurse, Patient and Patient's Family.    Luis Cuello MD  Hospitalist Service  [facility]    ______________________________________________________________________    Interval History   No new complaints    Data reviewed today: I reviewed all medications, new labs and imaging results over the last 24 hours. I personally reviewed no images or EKG's today.    Physical Exam   Vital Signs: Temp: 98.4  F (36.9  C) Temp src: Oral BP: 139/76 Pulse: 101 Heart Rate: 92 Resp: 16 SpO2: 99 % O2 Device: None (Room air) Oxygen Delivery: 6 LPM  Weight: 138 lbs 0 oz  Constitutional: awake, alert, cooperative, no apparent distress, and appears stated age    Data   CMP  Recent Labs   Lab 04/24/19  1108 04/24/19  0554 04/23/19  1741 04/23/19  1004    137 126* 127*   POTASSIUM 3.8 3.2*  3.2* 2.9* 3.1*   CHLORIDE  --  102 90* 92*   CO2  --  27 27 29   ANIONGAP  --  8 9 6   GLC  --  85 87 130*   BUN  --  11 14 15   CR  --  0.96 1.14* 1.11*   GFRESTIMATED  --  64 52* 54*   GFRESTBLACK  --  74 60* 62   SUE  --  8.1* 8.8 9.3   MAG  --  1.8  --   --    PROTTOTAL  --   --  7.9 7.5   ALBUMIN  --   --  3.5 3.3*   BILITOTAL  --   --  0.4 0.5   ALKPHOS  --   --  89 87   AST  --   --  25 19   ALT  --   --  17 17     CBC  Recent Labs   Lab 04/24/19  1554 04/24/19  1108 04/24/19  0554 04/23/19  1741 04/23/19  1004   WBC  --   --  7.0 7.1 7.4   RBC  --   --  2.98* 2.41* 2.37*   HGB 8.2* 8.1* 6.5* 4.1* 4.0*   HCT  --   --  21.7* 15.6* 15.5*   MCV  --   --  73* 65* 65*   MCH  --   --  21.8* 17.0* 16.9*   MCHC  --   --  30.0* 26.3* 25.8*   RDW  --   --  25.4* 21.1* 20.9*   PLT  --    --  387 529* 571*     INR  Recent Labs   Lab 04/23/19  1741   INR 1.08     Arterial Blood GasNo lab results found in last 7 days.

## 2019-04-24 NOTE — PROGRESS NOTES
Skin affirmation note    Admitting nurse completed full skin assessment, Hugh score and Hugh interventions. This writer agrees with the initial skin assessment findings.

## 2019-04-24 NOTE — PROGRESS NOTES
Chart reviewed for bed planning with anticipated admission to [facility].
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
## 2019-04-24 NOTE — BRIEF OP NOTE
[facility]    Brief Operative Note    Pre-operative diagnosis: GI bleed  Post-operative diagnosis     1. Esophagitis, LA Grade C  2. Gastritis  3. Gastric polyps    Procedure: Procedure(s):  ESOPHAGOGASTRODUODENOSCOPY (EGD)  Surgeon: Surgeon(s) and Role:     * Nic Reardon DO - Primary  Anesthesia: Monitor Anesthesia Care   Estimated blood loss: Minimal  Drains: None  Specimens:   ID Type Source Tests Collected by Time Destination   A : antrum for H pylori Biopsy Stomach, Antrum SURGICAL PATHOLOGY EXAM Nic Reardon,  4/24/2019  3:00 PM    B : gastric polyp Polyp Stomach, Body SURGICAL PATHOLOGY EXAM Nic Reardon,  4/24/2019  3:01 PM      Findings:   None.  Complications: Desaturation .  Implants:  * No implants in log *     Recommend repeat upper endoscopy in 6 weeks for repeat biopsies.  PPI therapy daily until then.  Abstain from alcohol and NSAIDs completely.

## 2019-04-25 ENCOUNTER — PATIENT OUTREACH (OUTPATIENT)
Dept: CARE COORDINATION | Facility: CLINIC | Age: 59
End: 2019-04-25

## 2019-04-25 VITALS
DIASTOLIC BLOOD PRESSURE: 81 MMHG | WEIGHT: 138 LBS | SYSTOLIC BLOOD PRESSURE: 137 MMHG | RESPIRATION RATE: 18 BRPM | OXYGEN SATURATION: 99 % | BODY MASS INDEX: 20.92 KG/M2 | HEIGHT: 68 IN | HEART RATE: 82 BPM | TEMPERATURE: 98.4 F

## 2019-04-25 LAB
ANION GAP SERPL CALCULATED.3IONS-SCNC: 8 MMOL/L (ref 3–14)
BUN SERPL-MCNC: 7 MG/DL (ref 7–30)
CALCIUM SERPL-MCNC: 7.9 MG/DL (ref 8.5–10.1)
CHLORIDE SERPL-SCNC: 105 MMOL/L (ref 94–109)
CO2 SERPL-SCNC: 23 MMOL/L (ref 20–32)
CREAT SERPL-MCNC: 0.85 MG/DL (ref 0.52–1.04)
ERYTHROCYTE [DISTWIDTH] IN BLOOD BY AUTOMATED COUNT: 24.9 % (ref 10–15)
GFR SERPL CREATININE-BSD FRML MDRD: 75 ML/MIN/{1.73_M2}
GLUCOSE SERPL-MCNC: 100 MG/DL (ref 70–99)
HCT VFR BLD AUTO: 25.7 % (ref 35–47)
HGB BLD-MCNC: 7.6 G/DL (ref 11.7–15.7)
HGB BLD-MCNC: 7.9 G/DL (ref 11.7–15.7)
MCH RBC QN AUTO: 22.2 PG (ref 26.5–33)
MCHC RBC AUTO-ENTMCNC: 29.6 G/DL (ref 31.5–36.5)
MCV RBC AUTO: 75 FL (ref 78–100)
PLATELET # BLD AUTO: 388 10E9/L (ref 150–450)
POTASSIUM SERPL-SCNC: 4 MMOL/L (ref 3.4–5.3)
RBC # BLD AUTO: 3.42 10E12/L (ref 3.8–5.2)
SODIUM SERPL-SCNC: 136 MMOL/L (ref 133–144)
WBC # BLD AUTO: 7.1 10E9/L (ref 4–11)

## 2019-04-25 PROCEDURE — 25000128 H RX IP 250 OP 636: Performed by: SURGERY

## 2019-04-25 PROCEDURE — 36415 COLL VENOUS BLD VENIPUNCTURE: CPT | Performed by: INTERNAL MEDICINE

## 2019-04-25 PROCEDURE — 90682 RIV4 VACC RECOMBINANT DNA IM: CPT | Performed by: INTERNAL MEDICINE

## 2019-04-25 PROCEDURE — 25000132 ZZH RX MED GY IP 250 OP 250 PS 637: Performed by: SURGERY

## 2019-04-25 PROCEDURE — 25000132 ZZH RX MED GY IP 250 OP 250 PS 637: Performed by: INTERNAL MEDICINE

## 2019-04-25 PROCEDURE — 80048 BASIC METABOLIC PNL TOTAL CA: CPT | Performed by: SURGERY

## 2019-04-25 PROCEDURE — 84132 ASSAY OF SERUM POTASSIUM: CPT | Performed by: SURGERY

## 2019-04-25 PROCEDURE — 25000128 H RX IP 250 OP 636: Performed by: INTERNAL MEDICINE

## 2019-04-25 PROCEDURE — C9113 INJ PANTOPRAZOLE SODIUM, VIA: HCPCS | Performed by: SURGERY

## 2019-04-25 PROCEDURE — 85027 COMPLETE CBC AUTOMATED: CPT | Performed by: SURGERY

## 2019-04-25 PROCEDURE — 99238 HOSP IP/OBS DSCHRG MGMT 30/<: CPT | Performed by: INTERNAL MEDICINE

## 2019-04-25 PROCEDURE — 85018 HEMOGLOBIN: CPT | Performed by: INTERNAL MEDICINE

## 2019-04-25 PROCEDURE — 36415 COLL VENOUS BLD VENIPUNCTURE: CPT | Performed by: SURGERY

## 2019-04-25 RX ORDER — ACETAMINOPHEN 500 MG
1000 TABLET ORAL ONCE
Status: COMPLETED | OUTPATIENT
Start: 2019-04-25 | End: 2019-04-25

## 2019-04-25 RX ORDER — OMEPRAZOLE 40 MG/1
40 CAPSULE, DELAYED RELEASE ORAL DAILY
Qty: 30 CAPSULE | Refills: 1 | Status: SHIPPED | OUTPATIENT
Start: 2019-04-25 | End: 2019-05-23

## 2019-04-25 RX ADMIN — HYDROXYZINE HYDROCHLORIDE 25 MG: 25 TABLET ORAL at 01:55

## 2019-04-25 RX ADMIN — FOLIC ACID 1 MG: 1 TABLET ORAL at 08:02

## 2019-04-25 RX ADMIN — PANTOPRAZOLE SODIUM 40 MG: 40 INJECTION, POWDER, FOR SOLUTION INTRAVENOUS at 16:32

## 2019-04-25 RX ADMIN — ATENOLOL 25 MG: 25 TABLET ORAL at 08:02

## 2019-04-25 RX ADMIN — INFLUENZA A VIRUS A/MICHIGAN/45/2015 (H1N1) RECOMBINANT HEMAGGLUTININ ANTIGEN, INFLUENZA A VIRUS A/SINGAPORE/INFIMH-16-0019/2016 (H3N2) RECOMBINANT HEMAGGLUTININ ANTIGEN, INFLUENZA B VIRUS B/MARYLAND/15/2016 RECOMBINANT HEMAGGLUTININ ANTIGEN, AND INFLUENZA B VIRUS B/PHUKET/3073/2013 RECOMBINANT HEMAGGLUTININ ANTIGEN 0.5 ML: 45; 45; 45; 45 INJECTION INTRAMUSCULAR at 11:17

## 2019-04-25 RX ADMIN — ACETAMINOPHEN 1000 MG: 500 TABLET ORAL at 12:07

## 2019-04-25 RX ADMIN — BUSPIRONE HYDROCHLORIDE 30 MG: 15 TABLET ORAL at 08:04

## 2019-04-25 RX ADMIN — Medication 100 MG: at 08:02

## 2019-04-25 RX ADMIN — MULTIPLE VITAMINS W/ MINERALS TAB 1 TABLET: TAB at 08:02

## 2019-04-25 RX ADMIN — PANTOPRAZOLE SODIUM 40 MG: 40 INJECTION, POWDER, FOR SOLUTION INTRAVENOUS at 04:18

## 2019-04-25 ASSESSMENT — ACTIVITIES OF DAILY LIVING (ADL)
ADLS_ACUITY_SCORE: 15

## 2019-04-25 NOTE — PLAN OF CARE
HGb this am 7.6. No stool overnight.   .   K TOMASA Hancock updated on am labs, no orders at this time.

## 2019-04-25 NOTE — PROGRESS NOTES
Clinic Care Coordination Contact    Situation: Patient chart reviewed by care coordinator.    Background: Received referral to care coordination and 4/23/2019.    Assessment: Review of chart noted that patient is currently admitted to the hospital and has been since 4/23/2019.    Plan/Recommendations: We will review chart in 2 business days for discharge and follow-up.    NABIL Laws, [facility] - Care Coordinator   [facility]  4/25/2019   9:14 AM  [phone]

## 2019-04-25 NOTE — DISCHARGE INSTRUCTIONS
[facility] Discharge Instructions     Discharge disposition:  Discharged to home       Diet:  Regular       Activity Activity as tolerated       Follow-up: Follow up with primary care provider in 1 weeks       Additional instructions: Repeat EGD with a colonoscopy in 6 weeks  No alcohol  No NSAIDS- (ibuprofen, naproxen, celebrex, bextra, diclofenac, indocin, ketoprofen, oxaprozin, meloxicam, nabumetone etc)

## 2019-04-25 NOTE — ANESTHESIA POSTPROCEDURE EVALUATION
Patient: Mihaela Vance    Procedure(s):  ESOPHAGOGASTRODUODENOSCOPY, WITH BIOPSY    Diagnosis:GI bleed  Diagnosis Additional Information: No value filed.    Anesthesia Type:  MAC    Note:  Anesthesia Post Evaluation    Patient location during evaluation: Bedside  Patient participation: Able to fully participate in evaluation  Level of consciousness: awake and alert  Pain management: adequate  Airway patency: patent  Cardiovascular status: acceptable  Respiratory status: acceptable  Hydration status: acceptable  PONV: none     Anesthetic complications: None          Last vitals:  Vitals:    04/24/19 1600 04/24/19 1630 04/24/19 1700   BP: 139/76 125/76 132/75   Pulse: 101 86 97   Resp:      Temp:      SpO2: 100%           Electronically Signed By: Chadwick Dimas CRNA, APRN CRNA  April 24, 2019  7:16 PM

## 2019-04-25 NOTE — PLAN OF CARE
No signs of alcohol withdrawal.  Gait more steady today.  HGB recheck 7.9.  She is tolerating diet, denies pain and voiding.  VSS.  Received flu shot this shift.  IV saline locked.

## 2019-04-25 NOTE — CONSULTS
Care Transition Initial Assessment - RN      Met with: Patient.    DATA   Principal Problem:    GI bleed  Active Problems:    Panic disorder    Hyponatremia    Hypertension, goal below 140/90    Fatty liver/ ?cirrhosis    Chronic pain syndrome       Primary Care Clinic Name: [facility]  Primary Care MD Name: Kylah Calix MD  Contact information and PCP information verified: Updated PCP in demographics    ASSESSMENT  Cognitive Status: awake, alert and oriented.  Who is your support system?:    Insurance Concerns: No Insurance issues identified    This writer met with pt, introduced self and role. Care Transitions is consulted for PCP, patient stated that Kylah Yepez MD is her PCP. This writer discussed discharge planning, Pt lives with her spouse in the community. Pt has no services at home and she does have family support.  Patient is in agreement and has a goal of going home upon discharge.  Transportation provided by family.    CTS note. Chart reviewed and pt discussed in interdisciplinary rounds. No anticipated discharge needs at this time. CTS team will continue to monitor daily in interdisciplinary rounds and will intervene as needed. If immediate needs arise, please contact CTS team at [phone].     PLAN  Home           Erinn Benavides RN Care Coordinator  [facility] [phone]  [facility] [phone]

## 2019-04-25 NOTE — PLAN OF CARE
Patient denies pain; no acute distress.  Ambulated in room to bathroom; unsteady on her feet, but patient reports this as her normal.  Anxiety and slight agitation noted; patient's speech is flighty.  Plan of care reviewed; all questions answered at this time.

## 2019-04-25 NOTE — DISCHARGE SUMMARY
"Discharge Summary    Mihaela Vance MRN# [id number]   YOB: 1960 Age: 59 year old     Date of Admission:  4/23/2019  Date of Discharge:  4/25/2019  Admitting Physician:  Eran Guillen MD  Discharge Physician:  Luis Cuello MD  Discharging Service:  Hospitalist       Primary Provider: Kylah Yepez            Discharge Diagnosis:     Principal Problem:    GI bleed  Active Problems:    Chronic pain syndrome    Panic disorder    Hyponatremia    Hypertension, goal below 140/90    Fatty liver/ ?cirrhosis               Discharge Disposition:     Discharged to home           Condition on Discharge:     Discharge condition:   Good   Discharge vitals: Blood pressure 137/81, pulse 82, temperature 98.4  F (36.9  C), temperature source Oral, resp. rate 18, height 1.727 m (5' 8\"), weight 62.6 kg (138 lb), last menstrual period 09/16/2006, SpO2 99 %, not currently breastfeeding.             Procedures / Labs / Imaging:     Esophagogastroduodenoscopy              Discharge Medications:     Current Discharge Medication List      CONTINUE these medications which have CHANGED    Details   omeprazole (PRILOSEC) 40 MG DR capsule Take 1 capsule (40 mg) by mouth daily  Qty: 30 capsule, Refills: 1    Associated Diagnoses: Gastrointestinal hemorrhage, unspecified gastrointestinal hemorrhage type         CONTINUE these medications which have NOT CHANGED    Details   alum & mag hydroxide-simethicone (MYLANTA/MAALOX) 200-200-20 MG/5ML SUSP suspension Take 30 mLs by mouth 2 times daily      atenolol (TENORMIN) 50 MG tablet Take 0.5 tablets (25 mg) by mouth daily  Qty: 45 tablet, Refills: 3    Associated Diagnoses: Essential hypertension with goal blood pressure less than 140/90      B Complex Vitamins (B COMPLEX PO) Take 1 tablet by mouth daily.    Associated Diagnoses: Generalized anxiety disorder; Panic disorder without agoraphobia      BusPIRone HCl 30 MG TABS Take 30 mg by mouth 2 times daily  Qty: 180 tablet, " Refills: 3    Associated Diagnoses: Anxiety      calcium carbonate (TUMS) 500 MG chewable tablet Take 2 chew tab by mouth 3 times daily      Calcium Carbonate-Vitamin D (CALCIUM + D PO) Take 2 tablets by mouth daily.    Associated Diagnoses: Generalized anxiety disorder; Panic disorder without agoraphobia      diclofenac (VOLTAREN) 1 % GEL topical gel Place 2 g onto the skin 4 times daily as needed for moderate pain  Qty: 60 g, Refills: 3    Associated Diagnoses: DDD (degenerative disc disease), lumbar      FOLIC ACID PO Take 400 mcg by mouth daily      furosemide (LASIX) 20 MG tablet Take 20 mg by mouth daily as needed  Refills: 0      hydrOXYzine (ATARAX) 25 MG tablet Take 1 tablet (25 mg) by mouth nightly as needed for itching or other (sleep)  Qty: 100 tablet, Refills: 3      magnesium oxide (MAG-OX) 400 MG tablet Take 1 tablet (400 mg) by mouth daily  Qty: 60 tablet, Refills: 3    Associated Diagnoses: Anemia      Omega-3 Fatty Acids (FISH OIL PO) Take 1 capsule by mouth daily     Associated Diagnoses: Generalized anxiety disorder; Panic disorder without agoraphobia      VITAMIN D, CHOLECALCIFEROL, PO Take 400 Units by mouth daily       cyclobenzaprine (FLEXERIL) 10 MG tablet Take 1 tablet (10 mg) by mouth 3 times daily  Qty: 30 tablet, Refills: 5    Associated Diagnoses: DDD (degenerative disc disease), lumbar         STOP taking these medications       amLODIPine (NORVASC) 5 MG tablet Comments:   Reason for Stopping:         celecoxib (CELEBREX) 200 MG capsule Comments:   Reason for Stopping:                     Consultations:     No consultations were requested during this admission             Brief History of Illness:     Mihaela Vance is a 59 year old female who presents with recurrent falls dizzy spells and was found to have hemoglobin of 4.0 when checked in clinic.  Patient has had 2 months of intermittent dizziness with standing and has fallen about 9 times.  Her  feels she loses consciousness  with these episodes but she does not think so.  She denies serious injury with the falls.  She is noticed her blood pressures from the running lower than the systolic as low as 73.  She did not make changes in medications.  She is also had progressive itching all over for the last month or so.  No rash.  Has not had this before.  She had no black stools or bright red blood per rectum.  No history of prior severe anemia.  No prior transfusions.          Hospital Course:     GI bleed, chronic    Anemia, microcytic   Suspect FLORI anemia from chronic GI bleeding no overt bleeding episodes noted. Progressive weakness, dizziness and falls. Hemoglobin 4.0 in clinic and sent to ED. . Has been having increasing heartburn and reflux lately   - Received 2 units PRBC and recheck hemoglobin 8.1. Remained stable after without symptoms.    - Upper endoscopy -showed Esophagitis, LA Grade C, Gastritis, Gastric polyps. Test had to be prematurely aborted due to hypoxemia (?related to gagging or apneas)     Recommend   -Repeat upper endoscopy in 6 weeks for repeat biopsies.    - PPI therapy daily until then.    - Abstain from alcohol and NSAIDs completely.  - May as well do colonoscopy inn 6 weeks, last was 9 years ago     Hyponatremia  Has had mild hyponatremia on Labs since 2017. Na initially 126, sudden increase to 137 this evening. Received D5W 1000 ml with recheck Na 135, Received 2nd 1000cc bolus D5W, recheck Na appropriate       Suspect alcohol abuse   Patient evasive regarding use. BAL 0.12 on admission. Denies withdrawal history but does not seem reliable    - CIWA has not been needed       ?Cirrhosis of liver (H)   Previous diagnosis uncertain veracity. Imaging suggests she has fatty liver. LFTS, Platelets and INR normal. No hepatomegaly. No asterixis or hepatic encephalopathy evident.        Panic disorder   - continued Buspar       Hypertension, goal below 140/90   Patient's BP has been running low for her lately.     -Continued atenolol  - Hold amlodipine until follow-up with Kylah Yepez               Final Day of Progress before Discharge:       Assessment and Plan:  Stable, as above, discharge          Interval History:  No comlaints          Physical Exam:  Vitals were reviewed  Constitutional:   awake, alert, cooperative, no apparent distress, and appears stated age          Data:  CMP  Recent Labs   Lab 04/25/19  0413 04/24/19  1808 04/24/19  1108 04/24/19  0554 04/23/19  1741 04/23/19  1004    134 135 137 126* 127*   POTASSIUM 4.0  --  3.8 3.2*  3.2* 2.9* 3.1*   CHLORIDE 105  --   --  102 90* 92*   CO2 23  --   --  27 27 29   ANIONGAP 8  --   --  8 9 6   *  --   --  85 87 130*   BUN 7  --   --  11 14 15   CR 0.85  --   --  0.96 1.14* 1.11*   GFRESTIMATED 75  --   --  64 52* 54*   GFRESTBLACK 87  --   --  74 60* 62   SUE 7.9*  --   --  8.1* 8.8 9.3   MAG  --   --   --  1.8  --   --    PROTTOTAL  --   --   --   --  7.9 7.5   ALBUMIN  --   --   --   --  3.5 3.3*   BILITOTAL  --   --   --   --  0.4 0.5   ALKPHOS  --   --   --   --  89 87   AST  --   --   --   --  25 19   ALT  --   --   --   --  17 17     CBC  Recent Labs   Lab 04/25/19  1334 04/25/19  0413 04/24/19  1554 04/24/19  1108 04/24/19  0554 04/23/19  1741 04/23/19  1004   WBC  --  7.1  --   --  7.0 7.1 7.4   RBC  --  3.42*  --   --  2.98* 2.41* 2.37*   HGB 7.9* 7.6* 8.2* 8.1* 6.5* 4.1* 4.0*   HCT  --  25.7*  --   --  21.7* 15.6* 15.5*   MCV  --  75*  --   --  73* 65* 65*   MCH  --  22.2*  --   --  21.8* 17.0* 16.9*   MCHC  --  29.6*  --   --  30.0* 26.3* 25.8*   RDW  --  24.9*  --   --  25.4* 21.1* 20.9*   PLT  --  388  --   --  387 529* 571*     INR  Recent Labs   Lab 04/23/19  1741   INR 1.08               Pending Results:       Unresulted Labs Ordered in the Past 30 Days of this Admission     Date and Time Order Name Status Description    4/24/2019 1500 Surgical pathology exam In process     4/23/2019 0945 VITAMIN B1 WHOLE BLOOD In process                   Discharge Instructions and Follow-Up:     Discharge disposition:  Discharged to home       Diet:  Regular       Activity Activity as tolerated       Follow-up: Follow up with primary care provider in 1 weeks       Additional instructions: Repeat EGD with a colonoscopy in 6 weeks  No alcohol  No NSAIDS- (ibuprofen, naproxen, celebrex, bextra, diclofenac, indocin, ketoprofen, oxaprozin, meloxicam, nabumetone etc)

## 2019-04-25 NOTE — PROGRESS NOTES
[place] [facility] DISCHARGE NOTE    Patient discharged to home at 6:51 PM via wheel chair. Accompanied by spouse and staff. Discharge instructions reviewed with patient, opportunity offered to ask questions. Prescriptions filled and sent with patient upon discharge. All belongings sent with patient.    Leti Gracia

## 2019-04-26 ENCOUNTER — PATIENT OUTREACH (OUTPATIENT)
Dept: CARE COORDINATION | Facility: CLINIC | Age: 59
End: 2019-04-26

## 2019-04-26 LAB
COPATH REPORT: NORMAL
VIT B1 BLD-MCNC: 44 NMOL/L (ref 70–180)

## 2019-04-26 ASSESSMENT — ACTIVITIES OF DAILY LIVING (ADL): DEPENDENT_IADLS:: INDEPENDENT

## 2019-04-26 NOTE — PROGRESS NOTES
Clinic Care Coordination Contact    Clinic Care Coordination Contact  OUTREACH    Referral Information:  Referral Source: IP Report    Primary Diagnosis: GI Disorders    Chief Complaint   Patient presents with     Clinic Care Coordination - Post Hospital     Care Team        [place] Utilization:   Clinic Utilization  Difficulty keeping appointments:: No  Compliance Concerns: No  No-Show Concerns: No  No PCP office visit in Past Year: No  Utilization    Last refreshed: 4/26/2019  7:43 AM:  Hospital Admissions 1           Last refreshed: 4/26/2019  7:43 AM:  ED Visits 0           Last refreshed: 4/26/2019  7:43 AM:  No Show Count (past year) 1              Current as of: 4/26/2019  7:43 AM              Clinical Concerns:  Current Medical Concerns:  Patient reports she is doing well at home. Patient has follow up appointments set up. Patient reports no new s/s no bleeding reported.     Current Behavioral Concerns: none    Education Provided to patient: Encouraged follow up appointment with PCP.    Medication Management:  Patient is knowledgeable on medications and is adherent. No financial concerns reported at this time.        Functional Status:  Dependent ADLs:: Independent  Dependent IADLs:: Independent  Bed or wheelchair confined:: No  Mobility Status: Independent    Living Situation:  Current living arrangement:: I live in a private home with spouse  Type of residence:: Private home - stairs    Diet/Exercise/Sleep:  Diet:: Regular  Inadequate nutrition (GOAL):: No  Food Insecurity: No  Tube Feeding: No  Exercise:: Currently not exercising    Transportation:  Transportation concerns (GOAL):: No  Transportation means:: Regular car     Psychosocial:  Yazidi or spiritual beliefs that impact treatment:: No  Mental health DX:: No  Mental health management concern (GOAL):: No  Informal Support system:: None     Financial/Insurance:   Financial/Insurance concerns (GOAL):: No       Resources and  Interventions:  Current Resources:    ;   Community Resources: None  Supplies used at home:: None  Equipment Currently Used at Home: none         Referrals Placed: None          Future Appointments              In 5 days Kylah Sánchez APRN [facility]          Plan: 1) Patient will continue to follow treatment plan as directed and follow up with PCP with concerns ongoing.   2) Care Coordination to remain available for future needs. Follow up planned for next week unless otherwise notified.     Erick Coker RN  Clinic Care Coordinator  [phone] or [phone]

## 2019-04-26 NOTE — LETTER
[place] CARE COORDINATION  [facility]  [address] [address]  [phone]  April 26, 2019    Mihaela Vance  [address] [place] [address]      Dear Mihaela,    I am a clinic care coordinator who works with Kylah Matos at [facility]. I recently tried to call and was unable to reach you. I wanted to introduce myself and provide you with my contact information so that you can call me with questions or concerns about your health care. Below is a description of clinic care coordination and how I can further assist you.     The clinic care coordinator is a registered nurse and/or  who understand the health care system. The goal of clinic care coordination is to help you manage your health and improve access to the [facility] system in the most efficient manner. The registered nurse can assist you in meeting your health care goals by providing education, coordinating services, and strengthening the communication among your providers. The  can assist you with financial, behavioral, psychosocial, chemical dependency, counseling, and/or psychiatric resources.    Please feel free to contact me at [phone], [phone], with any questions or concerns. We at [facility] are focused on providing you with the highest-quality healthcare experience possible and that all starts with you.     Sincerely,     Erick Coker RN  Clinic Care Coordinator    Enclosed: I have enclosed a copy of a 24 Hour Access Plan. This has helpful phone numbers for you to call when needed. Please keep this in an easy to access place to use as needed.

## 2019-04-26 NOTE — LETTER
Health Care Home - Access Care Plan    About Me:    Patient Name:  Mihaela Vance    YOB: 1960  Age:                      59 year old   [place] MRN:     [id number] Telephone Information:   Home Phone [phone]   Mobile [phone]       Address:  [address] [address] Email address:  No e-mail address on record      Emergency Contact(s)   Name Relationship Lgl Grd Work Phone Home Phone Mobile Phone   1MARYLU MOON CARA Significant ot*   [phone]             Health Maintenance:    Health Maintenance Due   Topic Date Due     ZOSTER IMMUNIZATION (1 of 2) 02/18/2009     ADVANCE DIRECTIVE PLANNING Q5 YRS  02/18/2014     PHQ-2  01/01/2019       My Access Plan  Medical Emergency 911   Questions or concerns during clinic hours Primary Clinic Line, I will call the clinic directly: [facility] - [phone]   24 Hour Appointment Line [phone] or  [phone]) (toll free)   24 Hour Nurse Line [phone] (toll free)   Questions or concerns outside clinic hours 24 Hour Appointment Line, I will call the after-hours on-call line:   [facility] [phone] or [phone] ([phone]) (toll-free)   Preferred Urgent Care [facility], [phone]   Preferred Hospital [place], [place]  [phone]   Preferred Pharmacy [facility] - [address]     Behavioral Health Crisis Line The National Suicide Prevention Lifeline at 1-122.678.8989 or 913                     My Care Team Members  Patient Care Team       Relationship Specialty Notifications Start End    Kylah Yepez PCP - General   4/25/19     Ralph Aviles MD MD Hematology & Oncology Admissions 5/6/15     Phone: [phone] Fax: [phone] [address] [facility] [address]    Ashlie Chan, RN Case Manager Hematology Admissions 5/6/15     Alicia Cameron APRN CNP Assigned PCP   9/2/18      Octavia Ansari, [facility] Care Coordinator Primary Care - CC Admissions 4/25/19     Phone: [phone] Fax: [phone]               My Medical and Care Information  Problem List   Patient Active Problem List   Diagnosis     Family history of diabetes mellitus     CARDIOVASCULAR SCREENING; LDL GOAL LESS THAN 160     Anxiety     Panic disorder     Hyponatremia     Hypertension, goal below 140/90     History of anemia     Fatty liver/ ?cirrhosis     Hyperlipidemia LDL goal <100     GI bleed     Chronic pain syndrome      Current Medications and Allergies:  See printed Medication Report

## 2019-04-26 NOTE — PROGRESS NOTES
Clinic Care Coordination Contact  [facility]/Voicemail    Referral Source: IP Report  Clinical Data: Care Coordinator Outreach  Outreach attempted x 1.  Left message on voicemail with call back information and requested return call.  Plan: Care Coordinator will mail out care coordination introduction letter with care coordinator contact information and explanation of care coordination services. Care Coordinator will try to reach patient again in 1-2 business days.  Erick Coker RN  Primary Care Clinic RN Care Coordinator  [facility]   [phone] or [phone]

## 2019-04-29 ENCOUNTER — PATIENT OUTREACH (OUTPATIENT)
Dept: CARE COORDINATION | Facility: CLINIC | Age: 59
End: 2019-04-29

## 2019-04-29 ASSESSMENT — ACTIVITIES OF DAILY LIVING (ADL): DEPENDENT_IADLS:: INDEPENDENT

## 2019-04-29 NOTE — PROGRESS NOTES
"Clinic Care Coordination Contact  Outreach attempt    Call placed to patient in person who answered said \"sorry\" and then all that was heard was noises.  No one came on the line and even when  would say something no one responded.    Initial referrals cited insurance and cost of office visit as concerns.  Care management in the hospital noted no insurance concerns on their assessment.    Will attempt call in about 1 week.     NABIL Laws, [place] - Care Coordinator   [facility]  4/29/2019   9:52 AM  [phone]    "

## 2019-04-29 NOTE — PROGRESS NOTES
Clinic Care Coordination Contact  [facility]/Voicemail    Referral Source: PCP  Clinical Data: Care Coordinator Outreach  Outreach attempted x 2.  Left message on voicemail with call back information and requested return call.  Plan:  Care Coordinator will try to reach patient again in 5-10 business days.  NABIL Laws, [place] - Care Coordinator   [facility]  4/29/2019   3:25 PM  [phone]

## 2019-05-01 ENCOUNTER — OFFICE VISIT (OUTPATIENT)
Dept: FAMILY MEDICINE | Facility: CLINIC | Age: 59
End: 2019-05-01
Payer: COMMERCIAL

## 2019-05-01 VITALS
HEART RATE: 80 BPM | DIASTOLIC BLOOD PRESSURE: 83 MMHG | BODY MASS INDEX: 20.92 KG/M2 | WEIGHT: 138 LBS | HEIGHT: 68 IN | OXYGEN SATURATION: 99 % | TEMPERATURE: 97.7 F | RESPIRATION RATE: 18 BRPM | SYSTOLIC BLOOD PRESSURE: 125 MMHG

## 2019-05-01 DIAGNOSIS — E87.1 HYPONATREMIA: ICD-10-CM

## 2019-05-01 DIAGNOSIS — E51.11 THIAMINE DEFICIENCY NEUROPATHY: Primary | ICD-10-CM

## 2019-05-01 DIAGNOSIS — D50.0 IRON DEFICIENCY ANEMIA DUE TO CHRONIC BLOOD LOSS: ICD-10-CM

## 2019-05-01 LAB
ANION GAP SERPL CALCULATED.3IONS-SCNC: 2 MMOL/L (ref 3–14)
BUN SERPL-MCNC: 16 MG/DL (ref 7–30)
CALCIUM SERPL-MCNC: 9.3 MG/DL (ref 8.5–10.1)
CHLORIDE SERPL-SCNC: 99 MMOL/L (ref 94–109)
CO2 SERPL-SCNC: 29 MMOL/L (ref 20–32)
CREAT SERPL-MCNC: 0.94 MG/DL (ref 0.52–1.04)
ERYTHROCYTE [DISTWIDTH] IN BLOOD BY AUTOMATED COUNT: 28.3 % (ref 10–15)
FERRITIN SERPL-MCNC: 16 NG/ML (ref 8–252)
GFR SERPL CREATININE-BSD FRML MDRD: 66 ML/MIN/{1.73_M2}
GLUCOSE SERPL-MCNC: 115 MG/DL (ref 70–99)
HCT VFR BLD AUTO: 29.8 % (ref 35–47)
HGB BLD-MCNC: 8.6 G/DL (ref 11.7–15.7)
IRON SATN MFR SERPL: 9 % (ref 15–46)
IRON SERPL-MCNC: 40 UG/DL (ref 35–180)
MCH RBC QN AUTO: 22.5 PG (ref 26.5–33)
MCHC RBC AUTO-ENTMCNC: 28.9 G/DL (ref 31.5–36.5)
MCV RBC AUTO: 78 FL (ref 78–100)
PLATELET # BLD AUTO: 413 10E9/L (ref 150–450)
POTASSIUM SERPL-SCNC: 4.4 MMOL/L (ref 3.4–5.3)
RBC # BLD AUTO: 3.82 10E12/L (ref 3.8–5.2)
SODIUM SERPL-SCNC: 130 MMOL/L (ref 133–144)
TIBC SERPL-MCNC: 429 UG/DL (ref 240–430)
WBC # BLD AUTO: 6 10E9/L (ref 4–11)

## 2019-05-01 PROCEDURE — 80048 BASIC METABOLIC PNL TOTAL CA: CPT | Performed by: NURSE PRACTITIONER

## 2019-05-01 PROCEDURE — 36415 COLL VENOUS BLD VENIPUNCTURE: CPT | Performed by: NURSE PRACTITIONER

## 2019-05-01 PROCEDURE — 99495 TRANSJ CARE MGMT MOD F2F 14D: CPT | Performed by: NURSE PRACTITIONER

## 2019-05-01 PROCEDURE — 83540 ASSAY OF IRON: CPT | Performed by: NURSE PRACTITIONER

## 2019-05-01 PROCEDURE — 83550 IRON BINDING TEST: CPT | Performed by: NURSE PRACTITIONER

## 2019-05-01 PROCEDURE — 82728 ASSAY OF FERRITIN: CPT | Performed by: NURSE PRACTITIONER

## 2019-05-01 PROCEDURE — 85027 COMPLETE CBC AUTOMATED: CPT | Performed by: NURSE PRACTITIONER

## 2019-05-01 RX ORDER — LANOLIN ALCOHOL/MO/W.PET/CERES
CREAM (GRAM) TOPICAL
Qty: 90 TABLET | Refills: 3 | Status: SHIPPED | OUTPATIENT
Start: 2019-05-01

## 2019-05-01 RX ORDER — FERROUS SULFATE 325(65) MG
325 TABLET ORAL
Qty: 100 TABLET | Refills: 3 | Status: SHIPPED | OUTPATIENT
Start: 2019-05-01

## 2019-05-01 ASSESSMENT — PAIN SCALES - GENERAL: PAINLEVEL: MILD PAIN (2)

## 2019-05-01 ASSESSMENT — MIFFLIN-ST. JEOR: SCORE: 1244.46

## 2019-05-01 NOTE — LETTER
May 2, 2019      Mihaela Vance  [address] [address] [facility] [address]        Dear ,    We are writing to inform you of your test results.    Patel Chairez, the hemoglobin and iron levels are improving. Start the daily iron supplement and we will check in 3 months. The sodium is slightly low. Avoid taking the lasix pills. We can recheck this next time also. Please let us know if you have any questions.     Resulted Orders   Basic metabolic panel  (Ca, Cl, CO2, Creat, Gluc, K, Na, BUN)   Result Value Ref Range    Sodium 130 (L) 133 - 144 mmol/L    Potassium 4.4 3.4 - 5.3 mmol/L    Chloride 99 94 - 109 mmol/L    Carbon Dioxide 29 20 - 32 mmol/L    Anion Gap 2 (L) 3 - 14 mmol/L    Glucose 115 (H) 70 - 99 mg/dL      Comment:      Non Fasting    Urea Nitrogen 16 7 - 30 mg/dL    Creatinine 0.94 0.52 - 1.04 mg/dL    GFR Estimate 66 >60 mL/min/[1.73_m2]      Comment:      Non  GFR Calc  Starting 12/18/2018, serum creatinine based estimated GFR (eGFR) will be   calculated using the Chronic Kidney Disease Epidemiology Collaboration   (CKD-EPI) equation.      GFR Estimate If Black 76 >60 mL/min/[1.73_m2]      Comment:       GFR Calc  Starting 12/18/2018, serum creatinine based estimated GFR (eGFR) will be   calculated using the Chronic Kidney Disease Epidemiology Collaboration   (CKD-EPI) equation.      Calcium 9.3 8.5 - 10.1 mg/dL   CBC with platelets   Result Value Ref Range    WBC 6.0 4.0 - 11.0 10e9/L    RBC Count 3.82 3.8 - 5.2 10e12/L    Hemoglobin 8.6 (L) 11.7 - 15.7 g/dL    Hematocrit 29.8 (L) 35.0 - 47.0 %    MCV 78 78 - 100 fl    MCH 22.5 (L) 26.5 - 33.0 pg    MCHC 28.9 (L) 31.5 - 36.5 g/dL    RDW 28.3 (H) 10.0 - 15.0 %    Platelet Count 413 150 - 450 10e9/L   Ferritin   Result Value Ref Range    Ferritin 16 8 - 252 ng/mL   Iron and iron binding capacity   Result Value Ref Range    Iron 40 35 - 180 ug/dL    Iron Binding Cap 429 240 - 430 ug/dL    Iron Saturation Index 9 (L)  15 - 46 %       If you have any questions or concerns, please call the clinic at the number listed above.       Sincerely,        RON Lora CNP

## 2019-05-01 NOTE — PATIENT INSTRUCTIONS
Patient Education     Iron-Deficiency Anemia (Adult)  Red blood cells carry oxygen to the tissues of your body. Anemia is a condition in which you have too few red blood cells. You need iron to make red cells. Anemia makes you feel tired and run down. When anemia becomes severe, your skin becomes pale. You may feel short of breath after physical activity. Other symptoms include:    Headaches    Dizziness    Leg cramps with physical activity    Drowsiness    Restless legs  Your anemia is caused by not having enough iron in your body. This may be because of:    Loss of blood. This can be caused by heavy menstrual periods. It can also be caused by bleeding from the stomach or intestines.    Poor diet. You may not be eating enough foods that contain iron.    Inability to absorb iron from the foods you eat    Pregnancy  If your blood count is low enough, your healthcare provider may prescribe an iron supplement. It usually takes about 2 to 3 months of treatment with iron supplements to correct anemia. Severe cases of anemia need a blood transfusion to quickly ease symptoms and deliver more oxygen to the cells.  Home care  Follow these guidelines when caring for yourself at home:    Eat foods high in iron. This will boost the amount of iron stored in your body. It is a natural way to build up the number of blood cells. Good sources of iron include beef, liver, spinach and other dark green leafy vegetables, whole grains, beans, and nuts.    Don't overexert yourself.    Talk with your healthcare provider before traveling by air or traveling to high altitudes.  Follow-up care  Follow up with your healthcare provider in 2 months, or as advised. This is to have another red blood cell count to be sure your anemia has been fixed.  Call 911  Call 911 or seek immediate medical care if any of these occur:    Shortness of breath or chest pain    Dizziness or fainting    Vomiting blood or passing red or black-colored stool   Date  Last Reviewed: 3/1/2018    1756-5366 [employer]. [address] [place] [address], [place] [address]. All rights reserved. This information is not intended as a substitute for professional medical care. Always follow your healthcare professional's instructions.

## 2019-05-01 NOTE — PROGRESS NOTES
SUBJECTIVE:   Mihaela Vance is a 59 year old female who presents to clinic today for the following   health issues:      Hospital Follow-up Visit:    Hospital/Nursing Home/IP Rehab Facility: [facility]  Date of Admission: 04/23/19  Date of Discharge: 04/25/19  Reason(s) for Admission: gastrointestinal bleeding            Problems taking medications regularly:  None       Medication changes since discharge: None       Problems adhering to non-medication therapy:  None    Summary of hospitalization:  [facility] discharge summary reviewed  Diagnostic Tests/Treatments reviewed.  Follow up needed: repeat endoscopy and colonoscopy in 6 weeks  Other Healthcare Providers Involved in Patient s Care:         None  Update since discharge: improved.     Post Discharge Medication Reconciliation: discharge medications reconciled, continue medications without change.  Plan of care communicated with patient     Coding guidelines for this visit:  Type of Medical   Decision Making Face-to-Face Visit       within 7 Days of discharge Face-to-Face Visit        within 14 days of discharge   Moderate Complexity [id number]   High Complexity 49135 66905            Additional history: as documented    Reviewed  and updated as needed this visit by clinical staff  Tobacco  Allergies  Meds  Med Hx  Surg Hx  Fam Hx  Soc Hx        Reviewed and updated as needed this visit by Provider         Patient Active Problem List   Diagnosis     Family history of diabetes mellitus     CARDIOVASCULAR SCREENING; LDL GOAL LESS THAN 160     Anxiety     Panic disorder     Hyponatremia     Hypertension, goal below 140/90     History of anemia     Fatty liver/ ?cirrhosis     Hyperlipidemia LDL goal <100     GI bleed     Chronic pain syndrome     Thiamine deficiency neuropathy     Iron deficiency anemia due to chronic blood loss     Past Surgical History:   Procedure Laterality Date     BONE MARROW BIOPSY, BONE SPECIMEN, NEEDLE/TROCAR  "N/A 6/2/2015    Procedure: BIOPSY BONE MARROW;  Surgeon: Cady Rodriguez MD;  Location: [facility]     COLONOSCOPY  12/8/2010    COLONOSCOPY performed by MADAY BADILLO at [facility]     ESOPHAGOSCOPY, GASTROSCOPY, DUODENOSCOPY (EGD), COMBINED N/A 4/24/2019    Procedure: ESOPHAGOGASTRODUODENOSCOPY, WITH BIOPSY;  Surgeon: Nic Reardon DO;  Location: [facility]     ORTHOPEDIC SURGERY  4/28/2005    Left knee medial and lateral meniscal tears.       Social History     Tobacco Use     Smoking status: Never Smoker     Smokeless tobacco: Never Used   Substance Use Topics     Alcohol use: Yes     Comment: 2 days per week 3-4 drinks      Family History   Problem Relation Age of Onset     Cardiovascular Brother 40        3 heart attacks, last MI 55 years old     Hypertension Brother      Diabetes Brother      C.A.D. Father          age 77 MI     Heart Disease Father         heart attack     Prostate Cancer Father      Cardiovascular Mother         CHF     Diabetes Mother      Hypertension Mother      Obesity Mother      Psychotic Disorder Mother         hospitalized after birth of third child for 6 weeks, was hitting her head on the wall and also hitting her head with a croquet mallet, placed on Thorazine.     Psychotic Disorder Maternal Grandmother         attempted suicide           ROS:  Constitutional, HEENT, cardiovascular, pulmonary, gi and gu systems are negative, except as otherwise noted.    OBJECTIVE:     /83   Pulse 80   Temp 97.7  F (36.5  C) (Tympanic)   Resp 18   Ht 1.727 m (5' 8\")   Wt 62.6 kg (138 lb)   LMP 09/16/2006 (Exact Date)   SpO2 99%   Breastfeeding? No   BMI 20.98 kg/m    Body mass index is 20.98 kg/m .  GENERAL: healthy, alert and no distress  EYES: Eyes grossly normal to inspection, PERRL and conjunctivae and sclerae normal  HENT: normal cephalic/atraumatic, oropharynx clear and oral mucous membranes moist  NECK: no adenopathy, no asymmetry, masses, or scars and thyroid normal to " palpation  RESP: lungs clear to auscultation - no rales, rhonchi or wheezes  CV: regular rates and rhythm, normal S1 S2, no S3 or S4 and no murmur, click or rub  ABDOMEN: soft, nontender and no organomegaly or masses  SKIN: no suspicious lesions or rashes  NEURO: Normal strength and tone, mentation intact and speech normal    Diagnostic Test Results:  No results found for this or any previous visit (from the past 24 hour(s)).    ASSESSMENT/PLAN:       ICD-10-CM    1. Thiamine deficiency neuropathy E51.11 vitamin B1 (THIAMINE) 100 MG tablet   2. Hyponatremia E87.1 Basic metabolic panel  (Ca, Cl, CO2, Creat, Gluc, K, Na, BUN)   3. Iron deficiency anemia due to chronic blood loss D50.0 CBC with platelets     Ferritin     Iron and iron binding capacity     ferrous sulfate (FEROSUL) 325 (65 Fe) MG tablet     GASTROENTEROLOGY ADULT REF PROCEDURE ONLY [facility] [phone]; No Provider Preference       FURTHER TESTING:       - see orders    FUTURE APPOINTMENTS:       - Follow-up visit in 3 months     Patient Instructions     Patient Education     Iron-Deficiency Anemia (Adult)  Red blood cells carry oxygen to the tissues of your body. Anemia is a condition in which you have too few red blood cells. You need iron to make red cells. Anemia makes you feel tired and run down. When anemia becomes severe, your skin becomes pale. You may feel short of breath after physical activity. Other symptoms include:    Headaches    Dizziness    Leg cramps with physical activity    Drowsiness    Restless legs  Your anemia is caused by not having enough iron in your body. This may be because of:    Loss of blood. This can be caused by heavy menstrual periods. It can also be caused by bleeding from the stomach or intestines.    Poor diet. You may not be eating enough foods that contain iron.    Inability to absorb iron from the foods you eat    Pregnancy  If your blood count is low enough, your healthcare provider may prescribe an  iron supplement. It usually takes about 2 to 3 months of treatment with iron supplements to correct anemia. Severe cases of anemia need a blood transfusion to quickly ease symptoms and deliver more oxygen to the cells.  Home care  Follow these guidelines when caring for yourself at home:    Eat foods high in iron. This will boost the amount of iron stored in your body. It is a natural way to build up the number of blood cells. Good sources of iron include beef, liver, spinach and other dark green leafy vegetables, whole grains, beans, and nuts.    Don't overexert yourself.    Talk with your healthcare provider before traveling by air or traveling to high altitudes.  Follow-up care  Follow up with your healthcare provider in 2 months, or as advised. This is to have another red blood cell count to be sure your anemia has been fixed.  Call 911  Call 911 or seek immediate medical care if any of these occur:    Shortness of breath or chest pain    Dizziness or fainting    Vomiting blood or passing red or black-colored stool   Date Last Reviewed: 3/1/2018    2103-1877 [employer]. [address] [place] [address]. All rights reserved. This information is not intended as a substitute for professional medical care. Always follow your healthcare professional's instructions.               RON Lora [facility]

## 2019-05-01 NOTE — PROGRESS NOTES
"  SUBJECTIVE:   Mihaela Vance is a 59 year old female who presents to clinic today for the following   health issues:        ED/UC Followup:    Facility:  [facility]  Date of visit: 04/23/19  Reason for visit: gastrointestinal bleeding  Current Status: feeling better         {additional problems for provider to add:878449}    Additional history: {NONE - AS DOCUMENTED:197648::\"as documented\"}    Reviewed  and updated as needed this visit by clinical staff  Tobacco  Allergies  Meds  Med Hx  Surg Hx  Fam Hx  Soc Hx        Reviewed and updated as needed this visit by Provider         {HIST REVIEW/ LINKS 2:765907}    {PROVIDER CHARTING PREFERENCE:076563}        "

## 2019-05-02 NOTE — RESULT ENCOUNTER NOTE
Please send patient letter notifying of labs/imaging and include provider message.    Hi Mihaela, the hemoglobin and iron levels are improving. Start the daily iron supplement and we will check in 3 months. The sodium is slightly low. Avoid taking the lasix pills. We can recheck this next time also. Please let us know if you have any questions.      Thanks,  RON Lopez CNP

## 2019-05-06 ASSESSMENT — ACTIVITIES OF DAILY LIVING (ADL): DEPENDENT_IADLS:: INDEPENDENT

## 2019-05-06 NOTE — PROGRESS NOTES
Clinic Care Coordination Contact  Outreach    Call placed to patient regarding her questions with insurance and cost of office visit.  She said she had received a $450 bill from the office in about 100 was knocked off and she was supposed to pay the rest.  She also said she got charged for a telephone call to the provider.  Her chart does not indicate any telephone visits where she would have been charged.  Patient went into long conversation that reviewed everything in the hospital and coverage etc. listened and offered empathy    Patient did not have any other concerns at the moment other than why she was billed so much.  Email was sent to patient her supervisor to see if they can find out any information as to why patient was billed as much as she was.    Plan:  will call once information is obtained.    NABIL Laws, [facility] - Care Coordinator   [facility]  5/6/2019   12:51 PM  [phone]

## 2019-05-13 ENCOUNTER — PATIENT OUTREACH (OUTPATIENT)
Dept: CARE COORDINATION | Facility: CLINIC | Age: 59
End: 2019-05-13

## 2019-05-13 ASSESSMENT — ACTIVITIES OF DAILY LIVING (ADL): DEPENDENT_IADLS:: INDEPENDENT

## 2019-05-13 NOTE — PROGRESS NOTES
Clinic Care Coordination Contact  [facility]/Voicemail    Referral Source: PCP  Clinical Data: Care Coordinator Outreach  Outreach attempted x 1.  Left message on voicemail with call back information and requested return call.  Plan:  Care Coordinator will try to reach patient again in 6-9 business days.  NABIL Laws, [place] [facility] - Care Coordinator   [facility]  5/13/2019   1:06 PM  [phone]

## 2019-05-17 NOTE — PROGRESS NOTES
Clinic Care Coordination Contact  Care Team Conversations    Patient called to ask about a bill she has received from [facility]. Patient is upset that she is being billed so much for services. Patient reports that she has been in contact with [facility] and they were of no help.    Care Coordinator encouraged Patient to continue to work with [facility] to make sure bills get set in through her insurance and she can also call and ask to speak with the clinic supervisor to ask about how things are billed and if anything can be done.     Erick Coker RN  Primary Care Clinic RN Care Coordinator  [facility]   [phone] or [phone]

## 2019-05-21 ASSESSMENT — ACTIVITIES OF DAILY LIVING (ADL): DEPENDENT_IADLS:: INDEPENDENT

## 2019-05-21 NOTE — PROGRESS NOTES
Clinic Care Coordination Contact    Pt continues to have questions about her bills.  She no longer has the bill to help identify what appointment.  She was not sure what appointment it was.  Patient is not wanting to call the billing office to try and get a copy of that bill.  She continues to complain about the cost of that and feels that she had a been billed to Medicare get suggestions and options that were given to her were declined.  She did mention that someone had told her to call the clinic and talk to the  and these names were given as an option.    Plan: Patient to either call the billing office to see what bills she has to pay and to verify if they were billed to Medicare or to call the clinic administrator.   to follow-up in about 1 month.    NABIL Laws, [place] [facility] - Care Coordinator   [facility]  5/21/2019   9:21 AM  [phone]

## 2019-05-22 ENCOUNTER — TELEPHONE (OUTPATIENT)
Dept: FAMILY MEDICINE | Facility: CLINIC | Age: 59
End: 2019-05-22

## 2019-05-22 DIAGNOSIS — F41.9 ANXIETY: ICD-10-CM

## 2019-05-22 DIAGNOSIS — K92.2 GASTROINTESTINAL HEMORRHAGE, UNSPECIFIED GASTROINTESTINAL HEMORRHAGE TYPE: ICD-10-CM

## 2019-05-22 NOTE — TELEPHONE ENCOUNTER
"Requested Prescriptions   Pending Prescriptions Disp Refills     busPIRone HCl (BUSPAR) 30 MG tablet 180 tablet 3     Sig: Take 1 tablet (30 mg) by mouth 2 times daily   Last Written Prescription Date:  8/21/18  Last Fill Quantity: 180 tab,  # refills: 3   Last office visit: 5/1/2019 with prescribing provider:  Kylah Sánchez     Future Office Visit:        Atypical Antidepressants Protocol Passed - 5/22/2019 11:17 AM        Passed - Recent (12 mo) or future (30 days) visit within the authorizing provider's specialty     Patient had office visit in the last 12 months or has a visit in the next 30 days with authorizing provider or within the authorizing provider's specialty.  See \"Patient Info\" tab in inbasket, or \"Choose Columns\" in Meds & Orders section of the refill encounter.              Passed - Medication active on med list        Passed - Patient is age 18 or older        Passed - No active pregnancy on record        Passed - No positive pregnancy test in past 12 mos        omeprazole (PRILOSEC) 40 MG DR capsule 30 capsule 1     Sig: Take 1 capsule (40 mg) by mouth daily   Last Written Prescription Date:  4/25/19  Last Fill Quantity: 30 cap,  # refills: 1   Last office visit: 5/1/2019 with prescribing provider:  Kylah Sánchez     Future Office Visit:        PPI Protocol Passed - 5/22/2019 11:17 AM        Passed - Not on Clopidogrel (unless Pantoprazole ordered)        Passed - No diagnosis of osteoporosis on record        Passed - Recent (12 mo) or future (30 days) visit within the authorizing provider's specialty     Patient had office visit in the last 12 months or has a visit in the next 30 days with authorizing provider or within the authorizing provider's specialty.  See \"Patient Info\" tab in inbasket, or \"Choose Columns\" in Meds & Orders section of the refill encounter.              Passed - Medication is active on med list        Passed - Patient is age 18 or older        Passed - No " active pregnacy on record        Passed - No positive pregnancy test in past 12 months

## 2019-05-23 RX ORDER — OMEPRAZOLE 40 MG/1
40 CAPSULE, DELAYED RELEASE ORAL DAILY
Qty: 90 CAPSULE | Refills: 3 | Status: SHIPPED | OUTPATIENT
Start: 2019-05-23 | End: 2020-06-02

## 2019-05-23 RX ORDER — BUSPIRONE HYDROCHLORIDE 30 MG/1
30 TABLET ORAL 2 TIMES DAILY
Qty: 180 TABLET | Refills: 3 | OUTPATIENT
Start: 2019-05-23

## 2019-05-23 NOTE — TELEPHONE ENCOUNTER
Message left for patient to return call to clinic.  CSS - ok to deliver message below.    Mable MANNING RN

## 2019-05-23 NOTE — TELEPHONE ENCOUNTER
Refilled. Please let her know she should follow up in office in 2 months for repeat labs/recheck. THanks. RON Lopez CNP

## 2019-05-23 NOTE — TELEPHONE ENCOUNTER
Routing refill request to provider for review/approval because:  Are you ok refilling this med?  She was prescribed while in hospital for GI bleed.  She saw you for f/u 5-1-19.    Mable MANNING RN

## 2019-05-28 RX ORDER — BUSPIRONE HYDROCHLORIDE 30 MG/1
30 TABLET ORAL 2 TIMES DAILY
Qty: 180 TABLET | Refills: 0 | Status: SHIPPED | OUTPATIENT
Start: 2019-05-28 | End: 2019-07-23

## 2019-05-30 ENCOUNTER — TELEPHONE (OUTPATIENT)
Dept: FAMILY MEDICINE | Facility: CLINIC | Age: 59
End: 2019-05-30

## 2019-05-30 ENCOUNTER — TELEPHONE (OUTPATIENT)
Dept: SURGERY | Facility: CLINIC | Age: 59
End: 2019-05-30

## 2019-05-30 NOTE — TELEPHONE ENCOUNTER
Reason for Call:  Other     Detailed comments: Pt is calling and concerned about her upcoming colonoscopy and her hemoglobin. She thinks she is going to end up in the hospital again. She want she blood count done again.       Phone Number Patient can be reached at: Home number on file [phone] (home)    Best Time: any    Can we leave a detailed message on this number? YES    Call taken on 5/30/2019 at 4:06 PM by Meghan Ramirez

## 2019-05-30 NOTE — TELEPHONE ENCOUNTER
I spoke to Mihaela today. She is scheduled to have an endoscopy and colonoscopy on 6-19-19.   She had a gastro on 4-23-19 due to decreasing Hgb. The procedure had to be discontinue because she became unstable during her endoscopy.    She calls today because she says that she does not want to stay in the hospital this time.  I told her that she should speak to her primary about this and perhaps she could check her Hgb just before the procedure. I will FYI the surgeon as well. Monalisa ROMERO Rn

## 2019-05-30 NOTE — TELEPHONE ENCOUNTER
Reason for Call:  Other call back    Detailed comments: pt calling stating she is scheduled for a procedure on 6/19, she does not want to stay in the hospital if her hemoglobin is not high enough.     Phone Number Patient can be reached at: Home number on file [phone] (home)    Best Time: any     Can we leave a detailed message on this number? YES    Call taken on 5/30/2019 at 3:40 PM by Phuong Nichols

## 2019-05-30 NOTE — TELEPHONE ENCOUNTER
Discussed colonoscopy with pt.  Pts Hgb was 8.6 on 5/1/19.  Procedure is 3 weeks away, pt will do all she can to elevate her lab values with diet.  Laura

## 2019-06-10 ENCOUNTER — TELEPHONE (OUTPATIENT)
Dept: FAMILY MEDICINE | Facility: CLINIC | Age: 59
End: 2019-06-10

## 2019-06-10 DIAGNOSIS — D64.9 ANEMIA, UNSPECIFIED TYPE: Primary | ICD-10-CM

## 2019-06-10 NOTE — TELEPHONE ENCOUNTER
Patient notified of lab ordered by provider.  Patient verbalized understanding    Jaimee MANNING Rn

## 2019-06-10 NOTE — TELEPHONE ENCOUNTER
Lab ordered. There is no complication associated with low hemoglobin. She had complications last time because her blood pressure was very low. We need to see if she is bleeding from somewhere. Have her schedule the lab draw and then we can discuss further based on the lab value. RON Lopez CNP

## 2019-06-10 NOTE — TELEPHONE ENCOUNTER
Reason for Call:  Low hgb    Detailed comments: patient is calling and stating that she is concerned that her HGB is too low, and will have complications with her Endoscopy and Colonoscopy. On May 1st her hgb was 8.6. She would like to get another Hgb drawn. Please advise.    Phone Number Patient can be reached at: Home number on file [phone] (home)    Best Time: any    Can we leave a detailed message on this number? YES   Jina Caraballo  Clinic Station  [facility]      Call taken on 6/10/2019 at 10:54 AM by Jina Caraballo

## 2019-06-10 NOTE — TELEPHONE ENCOUNTER
Patient is concerned about her HGB 5/1/19 of 8.6 and upcoming colonoscopy and endoscopy 6/19/19 at [facility]  She is requesting a new Hgb lab and or recommendations regarding procedure.    Routing to provider.    Jaimee MANNING Rn

## 2019-06-11 ENCOUNTER — TELEPHONE (OUTPATIENT)
Dept: FAMILY MEDICINE | Facility: CLINIC | Age: 59
End: 2019-06-11

## 2019-06-11 ENCOUNTER — ALLIED HEALTH/NURSE VISIT (OUTPATIENT)
Dept: FAMILY MEDICINE | Facility: CLINIC | Age: 59
End: 2019-06-11
Payer: COMMERCIAL

## 2019-06-11 VITALS — SYSTOLIC BLOOD PRESSURE: 110 MMHG | DIASTOLIC BLOOD PRESSURE: 82 MMHG

## 2019-06-11 DIAGNOSIS — I10 HYPERTENSION, GOAL BELOW 140/90: Primary | ICD-10-CM

## 2019-06-11 DIAGNOSIS — D64.9 ANEMIA, UNSPECIFIED TYPE: ICD-10-CM

## 2019-06-11 LAB — HGB BLD-MCNC: 10.4 G/DL (ref 11.7–15.7)

## 2019-06-11 PROCEDURE — 85018 HEMOGLOBIN: CPT | Performed by: NURSE PRACTITIONER

## 2019-06-11 PROCEDURE — 99207 ZZC NO CHARGE NURSE ONLY: CPT

## 2019-06-11 PROCEDURE — 36415 COLL VENOUS BLD VENIPUNCTURE: CPT | Performed by: NURSE PRACTITIONER

## 2019-06-11 NOTE — TELEPHONE ENCOUNTER
Message below relayed to patient and she expressed understanding & agreement with plan.    Gisele ANGULO RN

## 2019-06-11 NOTE — PROGRESS NOTES
"Mihaela Vance is a 59 year old year old patient who comes in today for a Blood Pressure check because of ongoing blood pressure monitoring.  Vital Signs as repeated by RN:  Vitals:    06/11/19 1017 06/11/19 1028   BP: 102/76 110/82   BP Location: Right arm Left arm   Patient Position: Sitting Sitting   Cuff Size: Adult Regular Adult Regular     Patient is taking medication as prescribed.    She reports around \"a couple weeks ago, approximately\" she, on her own, restarted taking amlodipine 5mg, every other day. Her BPs were 130/100 prior to resumption of the amlodipine. She says she was told by a provider that she would need to resume this medication if her BPs started to go up again, so she did.  She is also taking atenolol 25mg po daily.    Pt reports her BP on left arm is generally higher than her right; this was true as checked by RN today.    She took one furosemide a couple weeks ago, but overall has refrained from taking this. \"I couldn't see my ankles hardly.\"     Patient is tolerating medications well.  [facility] Reports it was 100/60 a few days ago at [facility].  Current complaints: none  Disposition:  Routed to provider to review and advise.    Gisele ANGULO RN      "

## 2019-06-11 NOTE — TELEPHONE ENCOUNTER
Can you please let her know that the hemoglobin is up to 10.4 from 8.6 at last check in clinic after she was out of the hospital. I would recommend to continue the atenolol. Stop the Amlodipine for now as her pressures are on the low side. The Amlodipine may have caused the swelling in the ankles. I would like her to try and not take the lasix as her sodium runs low and that makes it worse. Follow up in clinic in 1-2 months for repeat blood work. Thank you. RON Lopez CNP

## 2019-06-11 NOTE — TELEPHONE ENCOUNTER
"Mihaela Vance is a 59 year old year old patient who comes in today for a Blood Pressure check because of ongoing blood pressure monitoring.  Vital Signs as repeated by RN:  Vitals:    06/11/19 1017 06/11/19 1028   BP: 102/76 110/82   BP Location: Right arm Left arm   Patient Position: Sitting Sitting   Cuff Size: Adult Regular Adult Regular     Patient is taking medication as prescribed.    She reports around \"a couple weeks ago, approximately\" she, on her own, restarted taking amlodipine 5mg, every other day. Her BPs were 130/100 prior to resumption of the amlodipine. She says she was told by a provider that she would need to resume this medication if her BPs started to go up again, so she did.  She is also taking atenolol 25mg po daily.    Pt reports her BP on left arm is generally higher than her right; this was true as checked by RN today.    She took one furosemide a couple weeks ago, but overall has refrained from taking this. \"I couldn't see my ankles hardly.\"     Patient is tolerating medications well.  [facility] Reports it was 100/60 a few days ago at [facility].  Current complaints: none  Disposition:  Routed to provider to review and advise.    Gisele ANGULO RN  "

## 2019-06-17 ENCOUNTER — ANESTHESIA EVENT (OUTPATIENT)
Dept: GASTROENTEROLOGY | Facility: CLINIC | Age: 59
End: 2019-06-17
Payer: COMMERCIAL
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
## 2019-06-19 ENCOUNTER — HOSPITAL ENCOUNTER (OUTPATIENT)
Facility: CLINIC | Age: 59
Discharge: HOME | End: 2019-06-19
Attending: SURGERY | Admitting: SURGERY
Payer: COMMERCIAL

## 2019-06-19 ENCOUNTER — ANESTHESIA (OUTPATIENT)
Dept: GASTROENTEROLOGY | Facility: CLINIC | Age: 59
End: 2019-06-19
Payer: COMMERCIAL

## 2019-06-19 VITALS
BODY MASS INDEX: 20.92 KG/M2 | WEIGHT: 138 LBS | DIASTOLIC BLOOD PRESSURE: 97 MMHG | HEIGHT: 68 IN | HEART RATE: 68 BPM | RESPIRATION RATE: 16 BRPM | SYSTOLIC BLOOD PRESSURE: 167 MMHG | TEMPERATURE: 98.3 F | OXYGEN SATURATION: 100 %

## 2019-06-19 LAB
COLONOSCOPY: NORMAL
UPPER GI ENDOSCOPY: NORMAL

## 2019-06-19 PROCEDURE — 37000008 ZZH ANESTHESIA TECHNICAL FEE, 1ST 30 MIN: Performed by: SURGERY

## 2019-06-19 PROCEDURE — 25800030 ZZH RX IP 258 OP 636: Performed by: SURGERY

## 2019-06-19 PROCEDURE — 45385 COLONOSCOPY W/LESION REMOVAL: CPT | Performed by: SURGERY

## 2019-06-19 PROCEDURE — 45380 COLONOSCOPY AND BIOPSY: CPT | Performed by: SURGERY

## 2019-06-19 PROCEDURE — 88305 TISSUE EXAM BY PATHOLOGIST: CPT | Mod: 26 | Performed by: SURGERY

## 2019-06-19 PROCEDURE — 43239 EGD BIOPSY SINGLE/MULTIPLE: CPT | Mod: 51 | Performed by: SURGERY

## 2019-06-19 PROCEDURE — 88305 TISSUE EXAM BY PATHOLOGIST: CPT | Performed by: SURGERY

## 2019-06-19 PROCEDURE — 25000125 ZZHC RX 250: Performed by: NURSE ANESTHETIST, CERTIFIED REGISTERED

## 2019-06-19 PROCEDURE — 37000009 ZZH ANESTHESIA TECHNICAL FEE, EACH ADDTL 15 MIN: Performed by: SURGERY

## 2019-06-19 PROCEDURE — 43239 EGD BIOPSY SINGLE/MULTIPLE: CPT | Performed by: SURGERY

## 2019-06-19 PROCEDURE — 25000125 ZZHC RX 250: Performed by: SURGERY

## 2019-06-19 PROCEDURE — 25000128 H RX IP 250 OP 636: Performed by: NURSE ANESTHETIST, CERTIFIED REGISTERED

## 2019-06-19 RX ORDER — LIDOCAINE 40 MG/G
CREAM TOPICAL
Status: DISCONTINUED | OUTPATIENT
Start: 2019-06-19 | End: 2019-06-19 | Stop reason: HOSPADM

## 2019-06-19 RX ORDER — SODIUM CHLORIDE, SODIUM LACTATE, POTASSIUM CHLORIDE, CALCIUM CHLORIDE 600; 310; 30; 20 MG/100ML; MG/100ML; MG/100ML; MG/100ML
INJECTION, SOLUTION INTRAVENOUS CONTINUOUS
Status: DISCONTINUED | OUTPATIENT
Start: 2019-06-19 | End: 2019-06-19 | Stop reason: HOSPADM

## 2019-06-19 RX ORDER — GLYCOPYRROLATE 0.2 MG/ML
INJECTION, SOLUTION INTRAMUSCULAR; INTRAVENOUS PRN
Status: DISCONTINUED | OUTPATIENT
Start: 2019-06-19 | End: 2019-06-19

## 2019-06-19 RX ORDER — PROPOFOL 10 MG/ML
INJECTION, EMULSION INTRAVENOUS CONTINUOUS PRN
Status: DISCONTINUED | OUTPATIENT
Start: 2019-06-19 | End: 2019-06-19

## 2019-06-19 RX ORDER — ONDANSETRON 2 MG/ML
4 INJECTION INTRAMUSCULAR; INTRAVENOUS
Status: DISCONTINUED | OUTPATIENT
Start: 2019-06-19 | End: 2019-06-19 | Stop reason: HOSPADM

## 2019-06-19 RX ORDER — LIDOCAINE HYDROCHLORIDE 10 MG/ML
INJECTION, SOLUTION INFILTRATION; PERINEURAL PRN
Status: DISCONTINUED | OUTPATIENT
Start: 2019-06-19 | End: 2019-06-19

## 2019-06-19 RX ORDER — FLUMAZENIL 0.1 MG/ML
0.2 INJECTION, SOLUTION INTRAVENOUS
Status: DISCONTINUED | OUTPATIENT
Start: 2019-06-19 | End: 2019-06-19 | Stop reason: HOSPADM

## 2019-06-19 RX ORDER — NALOXONE HYDROCHLORIDE 0.4 MG/ML
.1-.4 INJECTION, SOLUTION INTRAMUSCULAR; INTRAVENOUS; SUBCUTANEOUS
Status: DISCONTINUED | OUTPATIENT
Start: 2019-06-19 | End: 2019-06-19 | Stop reason: HOSPADM

## 2019-06-19 RX ADMIN — PROPOFOL 200 MCG/KG/MIN: 10 INJECTION, EMULSION INTRAVENOUS at 10:43

## 2019-06-19 RX ADMIN — GLYCOPYRROLATE 0.2 MG: 0.2 INJECTION, SOLUTION INTRAMUSCULAR; INTRAVENOUS at 10:38

## 2019-06-19 RX ADMIN — SODIUM CHLORIDE, POTASSIUM CHLORIDE, SODIUM LACTATE AND CALCIUM CHLORIDE: 600; 310; 30; 20 INJECTION, SOLUTION INTRAVENOUS at 10:03

## 2019-06-19 RX ADMIN — TOPICAL ANESTHETIC 1 SPRAY: 200 SPRAY DENTAL; PERIODONTAL at 10:38

## 2019-06-19 RX ADMIN — LIDOCAINE HYDROCHLORIDE 50 MG: 10 INJECTION, SOLUTION INFILTRATION; PERINEURAL at 10:43

## 2019-06-19 RX ADMIN — LIDOCAINE HYDROCHLORIDE 0.1 ML: 10 INJECTION, SOLUTION EPIDURAL; INFILTRATION; INTRACAUDAL; PERINEURAL at 10:03

## 2019-06-19 ASSESSMENT — MIFFLIN-ST. JEOR: SCORE: 1244.46

## 2019-06-19 NOTE — ANESTHESIA POSTPROCEDURE EVALUATION
Patient: Mihaela Vance    Procedure(s):  COLONOSCOPY, FLEXIBLE, WITH LESION REMOVAL USING SNARE  ESOPHAGOGASTRODUODENOSCOPY, WITH BIOPSY    Diagnosis:iron def anemia  diagnostic  Diagnosis Additional Information: No value filed.    Anesthesia Type:  No value filed.    Note:  Anesthesia Post Evaluation    Patient location during evaluation: Phase 2  Patient participation: Able to fully participate in evaluation  Level of consciousness: awake and alert  Pain management: adequate  Airway patency: patent  Cardiovascular status: acceptable and hemodynamically stable  Respiratory status: acceptable, room air and spontaneous ventilation  Hydration status: acceptable  PONV: none     Anesthetic complications: None          Last vitals:  Vitals:    06/19/19 0901 06/19/19 1146   BP: (!) 147/109 123/88   Pulse:  87   Resp: 18 16   Temp: 36.8  C (98.3  F)    SpO2: 100% 98%         Electronically Signed By: RON Sauceda CRNA  June 19, 2019  12:01 PM

## 2019-06-19 NOTE — ANESTHESIA CARE TRANSFER NOTE
Patient: Mihaela Vance    Procedure(s):  COLONOSCOPY, FLEXIBLE, WITH LESION REMOVAL USING SNARE  ESOPHAGOGASTRODUODENOSCOPY, WITH BIOPSY    Diagnosis: iron def anemia  diagnostic  Diagnosis Additional Information: No value filed.    Anesthesia Type:   No value filed.     Note:  Airway :Nasal Cannula  Patient transferred to:Phase II  Handoff Report: Identifed the Patient, Identified the Reponsible Provider, Reviewed the pertinent medical history, Discussed the surgical course, Reviewed Intra-OP anesthesia mangement and issues during anesthesia, Set expectations for post-procedure period and Allowed opportunity for questions and acknowledgement of understanding      Vitals: (Last set prior to Anesthesia Care Transfer)    CRNA VITALS  6/19/2019 1114 - 6/19/2019 1145      6/19/2019             Pulse:  89    SpO2:  100 %                Electronically Signed By: RON Sauceda CRNA  June 19, 2019  11:45 AM
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
## 2019-06-21 LAB — COPATH REPORT: NORMAL

## 2019-06-26 ENCOUNTER — PATIENT OUTREACH (OUTPATIENT)
Dept: CARE COORDINATION | Facility: CLINIC | Age: 59
End: 2019-06-26

## 2019-06-26 ASSESSMENT — ACTIVITIES OF DAILY LIVING (ADL): DEPENDENT_IADLS:: INDEPENDENT

## 2019-06-26 NOTE — PROGRESS NOTES
Clinic Care Coordination Contact  [facility]/Voicemail    Referral Source: PCP  Clinical Data: Care Coordinator Outreach  Outreach attempted x 1.  Left message on voicemail with call back information and requested return call.  Plan: Care Coordinator will try to reach patient again in 6-9 business days.    NABIL Laws, [facility] - Care Coordinator   [facility]  6/26/2019   8:49 AM  [phone]

## 2019-06-27 ASSESSMENT — ACTIVITIES OF DAILY LIVING (ADL): DEPENDENT_IADLS:: INDEPENDENT

## 2019-06-27 NOTE — PROGRESS NOTES
Clinic Care Coordination Contact  [facility]/Voicemail    Referral Source: PCP  Clinical Data: Care Coordinator Outreach  Outreach attempted x 2, pt left a message after last call.  Left message on voicemail with call back information and requested return call.  Plan: Care Coordinator will try to reach patient again in 10-15 business days.    NABIL Laws, [facility] - Care Coordinator   [facility]  6/27/2019   2:13 PM  [phone]

## 2019-07-15 DIAGNOSIS — M51.369 DDD (DEGENERATIVE DISC DISEASE), LUMBAR: ICD-10-CM

## 2019-07-15 ASSESSMENT — ACTIVITIES OF DAILY LIVING (ADL): DEPENDENT_IADLS:: INDEPENDENT

## 2019-07-15 NOTE — PROGRESS NOTES
Clinic Care Coordination Contact  Follow Up Progress Note      Assessment: follow up call placed to check on bill coverage/support.       Goals:     n/a     Intervention/Education provided during outreach: pt said she is working with the billing office for her bill from a year ago.  There was nothing for CC to assist with.  She brought up her phone call for a refill on a medication.  Reviewed chart and noted to pt that the message has been sent to her PCP for refill.      Pt denied any other needs at this time.      Outreach Frequency: no further outreaches      Plan:   pt will follow up with clinic on refill and appointments as scheduled.      Care Coordinator will close to care coordination.     NABIL Laws, [facility] - Care Coordinator   [facility]  7/15/2019   1:22 PM  [phone]

## 2019-07-15 NOTE — TELEPHONE ENCOUNTER
Patient has seen Kylah this year,   Kylah, please see notes below,   She wants this voltaren gel refilled.     Thanks,   Zita Yost RNC

## 2019-07-23 ENCOUNTER — OFFICE VISIT (OUTPATIENT)
Dept: FAMILY MEDICINE | Facility: CLINIC | Age: 59
End: 2019-07-23
Payer: COMMERCIAL

## 2019-07-23 VITALS
HEIGHT: 68 IN | RESPIRATION RATE: 17 BRPM | HEART RATE: 94 BPM | BODY MASS INDEX: 21.55 KG/M2 | DIASTOLIC BLOOD PRESSURE: 102 MMHG | SYSTOLIC BLOOD PRESSURE: 152 MMHG | WEIGHT: 142.2 LBS | OXYGEN SATURATION: 100 % | TEMPERATURE: 96.9 F

## 2019-07-23 DIAGNOSIS — M51.369 DDD (DEGENERATIVE DISC DISEASE), LUMBAR: ICD-10-CM

## 2019-07-23 DIAGNOSIS — I10 ESSENTIAL HYPERTENSION WITH GOAL BLOOD PRESSURE LESS THAN 140/90: ICD-10-CM

## 2019-07-23 DIAGNOSIS — D50.0 IRON DEFICIENCY ANEMIA DUE TO CHRONIC BLOOD LOSS: Primary | ICD-10-CM

## 2019-07-23 DIAGNOSIS — F41.9 ANXIETY: ICD-10-CM

## 2019-07-23 LAB
ANION GAP SERPL CALCULATED.3IONS-SCNC: 9 MMOL/L (ref 3–14)
BUN SERPL-MCNC: 16 MG/DL (ref 7–30)
CALCIUM SERPL-MCNC: 10.1 MG/DL (ref 8.5–10.1)
CHLORIDE SERPL-SCNC: 93 MMOL/L (ref 94–109)
CO2 SERPL-SCNC: 26 MMOL/L (ref 20–32)
CREAT SERPL-MCNC: 1.06 MG/DL (ref 0.52–1.04)
ERYTHROCYTE [DISTWIDTH] IN BLOOD BY AUTOMATED COUNT: 16.2 % (ref 10–15)
GFR SERPL CREATININE-BSD FRML MDRD: 57 ML/MIN/{1.73_M2}
GLUCOSE SERPL-MCNC: 109 MG/DL (ref 70–99)
HCT VFR BLD AUTO: 33.9 % (ref 35–47)
HGB BLD-MCNC: 11.2 G/DL (ref 11.7–15.7)
MCH RBC QN AUTO: 29.5 PG (ref 26.5–33)
MCHC RBC AUTO-ENTMCNC: 33 G/DL (ref 31.5–36.5)
MCV RBC AUTO: 89 FL (ref 78–100)
PLATELET # BLD AUTO: 387 10E9/L (ref 150–450)
POTASSIUM SERPL-SCNC: 5.4 MMOL/L (ref 3.4–5.3)
RBC # BLD AUTO: 3.8 10E12/L (ref 3.8–5.2)
SODIUM SERPL-SCNC: 128 MMOL/L (ref 133–144)
WBC # BLD AUTO: 6.3 10E9/L (ref 4–11)

## 2019-07-23 PROCEDURE — 99214 OFFICE O/P EST MOD 30 MIN: CPT | Performed by: NURSE PRACTITIONER

## 2019-07-23 PROCEDURE — 36415 COLL VENOUS BLD VENIPUNCTURE: CPT | Performed by: NURSE PRACTITIONER

## 2019-07-23 PROCEDURE — 85027 COMPLETE CBC AUTOMATED: CPT | Performed by: NURSE PRACTITIONER

## 2019-07-23 PROCEDURE — 80048 BASIC METABOLIC PNL TOTAL CA: CPT | Performed by: NURSE PRACTITIONER

## 2019-07-23 RX ORDER — CYCLOBENZAPRINE HCL 10 MG
10 TABLET ORAL
Qty: 30 TABLET | Refills: 1 | Status: SHIPPED | OUTPATIENT
Start: 2019-07-23 | End: 2020-10-05

## 2019-07-23 RX ORDER — ATENOLOL 50 MG/1
50 TABLET ORAL DAILY
Qty: 90 TABLET | Refills: 3 | Status: SHIPPED | OUTPATIENT
Start: 2019-07-23 | End: 2020-08-20

## 2019-07-23 RX ORDER — BUSPIRONE HYDROCHLORIDE 30 MG/1
30 TABLET ORAL 2 TIMES DAILY
Qty: 180 TABLET | Refills: 3 | Status: SHIPPED | OUTPATIENT
Start: 2019-07-23 | End: 2021-01-18

## 2019-07-23 RX ORDER — AMLODIPINE BESYLATE 2.5 MG/1
2.5 TABLET ORAL DAILY
Qty: 30 TABLET | Refills: 1 | Status: SHIPPED | OUTPATIENT
Start: 2019-07-23 | End: 2019-10-04

## 2019-07-23 RX ORDER — HYDROXYZINE HYDROCHLORIDE 25 MG/1
25 TABLET, FILM COATED ORAL
Qty: 90 TABLET | Refills: 3 | Status: SHIPPED | OUTPATIENT
Start: 2019-07-23 | End: 2020-12-17

## 2019-07-23 ASSESSMENT — MIFFLIN-ST. JEOR: SCORE: 1263.51

## 2019-07-23 NOTE — PATIENT INSTRUCTIONS
Patient Education     Iron-Deficiency Anemia (Adult)  Red blood cells carry oxygen to the tissues of your body. Anemia is a condition in which you have too few red blood cells. You need iron to make red cells. Anemia makes you feel tired and run down. When anemia becomes severe, your skin becomes pale. You may feel short of breath after physical activity. Other symptoms include:    Headaches    Dizziness    Leg cramps with physical activity    Drowsiness    Restless legs  Your anemia is caused by not having enough iron in your body. This may be because of:    Loss of blood. This can be caused by heavy menstrual periods. It can also be caused by bleeding from the stomach or intestines.    Poor diet. You may not be eating enough foods that contain iron.    Inability to absorb iron from the foods you eat    Pregnancy  If your blood count is low enough, your healthcare provider may prescribe an iron supplement. It usually takes about 2 to 3 months of treatment with iron supplements to correct anemia. Severe cases of anemia need a blood transfusion to quickly ease symptoms and deliver more oxygen to the cells.  Home care  Follow these guidelines when caring for yourself at home:    Eat foods high in iron. This will boost the amount of iron stored in your body. It is a natural way to build up the number of blood cells. Good sources of iron include beef, liver, spinach and other dark green leafy vegetables, whole grains, beans, and nuts.    Don't overexert yourself.    Talk with your healthcare provider before traveling by air or traveling to high altitudes.  Follow-up care  Follow up with your healthcare provider in 2 months, or as advised. This is to have another red blood cell count to be sure your anemia has been fixed.  Call 911  Call 911 or seek immediate medical care if any of these occur:    Shortness of breath or chest pain    Dizziness or fainting    Vomiting blood or passing red or black-colored stool   Date  Last Reviewed: 3/1/2018    7551-3861 [employer]. [address] [place] [address], [place] [address]. All rights reserved. This information is not intended as a substitute for professional medical care. Always follow your healthcare professional's instructions.

## 2019-07-23 NOTE — PROGRESS NOTES
Subjective     Mihaela Vance is a 59 year old female who presents to clinic today for the following health issues:    HPI   Hyperlipidemia Follow-Up      Are you having any of the following symptoms? (Select all that apply)  No complaints of shortness of breath, chest pain or pressure.  No increased sweating or nausea with activity.  No left-sided neck or arm pain.  No complaints of pain in calves when walking 1-2 blocks.    Are you regularly taking any medication or supplement to lower your cholesterol?   No Because she is having Liver issues.    Are you having muscle aches or other side effects that you think could be caused by your cholesterol lowering medication?  No      Hypertension Follow-up      Do you check your blood pressure regularly outside of the clinic? Yes     Are you following a low salt diet? Yes    Are your blood pressures ever more than 140 on the top number (systolic) OR more   than 90 on the bottom number (diastolic), for example 140/90? No Stopped amlodipine for 2 weeks because it was so low.    Amount of exercise or physical activity: Not much has 3 herniated discs.     Problems taking medications regularly: No    Medication side effects: none    Diet: regular (no restrictions) and low salt      Medication Followup of Cyclobenzaprine    Taking Medication as prescribed: yes- as needed    Side Effects:  None    Medication Helping Symptoms:  Yes- uses PRN for when her back hurts, this usually happens if she does a lot of house work.     Anxiety Follow-Up    How are you doing with your anxiety since your last visit? No change    Are you having other symptoms that might be associated with anxiety? No    Have you had a significant life event? No     Are you feeling depressed? No    Do you have any concerns with your use of alcohol or other drugs? No    Social History     Tobacco Use     Smoking status: Never Smoker     Smokeless tobacco: Never Used   Substance Use Topics     Alcohol use: Yes      Comment: 2 days per week 3-4 drinks      Drug use: No     PERCY-7 SCORE 10/15/2012 11/20/2012 10/16/2013   Total Score 4 0 1     No flowsheet data found.  No flowsheet data found.  No flowsheet data found.      Patient Active Problem List   Diagnosis     Family history of diabetes mellitus     CARDIOVASCULAR SCREENING; LDL GOAL LESS THAN 160     Anxiety     Panic disorder     Hyponatremia     Hypertension, goal below 140/90     History of anemia     Fatty liver/ ?cirrhosis     Hyperlipidemia LDL goal <100     GI bleed     Chronic pain syndrome     Thiamine deficiency neuropathy     Iron deficiency anemia due to chronic blood loss     Past Surgical History:   Procedure Laterality Date     BONE MARROW BIOPSY, BONE SPECIMEN, NEEDLE/TROCAR N/A 6/2/2015    Procedure: BIOPSY BONE MARROW;  Surgeon: Cady Rodriguez MD;  Location: [facility]     COLONOSCOPY  12/8/2010    COLONOSCOPY performed by MADAY BADILLO at [facility]     ESOPHAGOSCOPY, GASTROSCOPY, DUODENOSCOPY (EGD), COMBINED N/A 4/24/2019    Procedure: ESOPHAGOGASTRODUODENOSCOPY, WITH BIOPSY;  Surgeon: Nic Reardon DO;  Location: [facility]     ESOPHAGOSCOPY, GASTROSCOPY, DUODENOSCOPY (EGD), COMBINED N/A 6/19/2019    Procedure: ESOPHAGOGASTRODUODENOSCOPY, WITH BIOPSY;  Surgeon: Nic Reardon DO;  Location: [facility]     ORTHOPEDIC SURGERY  4/28/2005    Left knee medial and lateral meniscal tears.       Social History     Tobacco Use     Smoking status: Never Smoker     Smokeless tobacco: Never Used   Substance Use Topics     Alcohol use: Yes     Comment: 2 days per week 3-4 drinks      Family History   Problem Relation Age of Onset     Cardiovascular Brother 40        3 heart attacks, last MI 55 years old     Hypertension Brother      Diabetes Brother      C.A.D. Father          age 77 MI     Heart Disease Father         heart attack     Prostate Cancer Father      Cardiovascular Mother         CHF     Diabetes Mother      Hypertension Mother      Obesity  "Mother      Psychotic Disorder Mother         hospitalized after birth of third child for 6 weeks, was hitting her head on the wall and also hitting her head with a croquet mallet, placed on Thorazine.     Psychotic Disorder Maternal Grandmother         attempted suicide           Reviewed and updated as needed this visit by Provider         Review of Systems   ROS COMP: Constitutional, HEENT, cardiovascular, pulmonary, gi and gu systems are negative, except as otherwise noted.      Objective    BP (!) 152/102 (BP Location: Right arm, Patient Position: Sitting, Cuff Size: Adult Regular)   Pulse 94   Temp 96.9  F (36.1  C) (Oral)   Resp 17   Ht 1.727 m (5' 8\")   Wt 64.5 kg (142 lb 3.2 oz)   LMP 09/16/2006 (Exact Date)   SpO2 100%   BMI 21.62 kg/m    Body mass index is 21.62 kg/m .  Physical Exam   GENERAL: healthy, alert and no distress  EYES: Eyes grossly normal to inspection, PERRL and conjunctivae and sclerae normal  HENT: normal cephalic/atraumatic, oropharynx clear and oral mucous membranes moist  NECK: no adenopathy, no asymmetry, masses, or scars and thyroid normal to palpation  RESP: lungs clear to auscultation - no rales, rhonchi or wheezes  CV: regular rate and rhythm, normal S1 S2, no S3 or S4, no murmur, click or rub, no peripheral edema and peripheral pulses strong  ABDOMEN: soft, nontender, no hepatosplenomegaly, no masses and bowel sounds normal  MS: no gross musculoskeletal defects noted, no edema  NEURO: Normal strength and tone, mentation intact and speech normal  PSYCH: mentation appears normal, affect normal/bright    Diagnostic Test Results:  Labs reviewed in Epic  Results for orders placed or performed in visit on 07/23/19 (from the past 24 hour(s))   CBC with platelets   Result Value Ref Range    WBC 6.3 4.0 - 11.0 10e9/L    RBC Count 3.80 3.8 - 5.2 10e12/L    Hemoglobin 11.2 (L) 11.7 - 15.7 g/dL    Hematocrit 33.9 (L) 35.0 - 47.0 %    MCV 89 78 - 100 fl    MCH 29.5 26.5 - 33.0 pg    " MCHC 33.0 31.5 - 36.5 g/dL    RDW 16.2 (H) 10.0 - 15.0 %    Platelet Count 387 150 - 450 10e9/L           Assessment & Plan       ICD-10-CM    1. Iron deficiency anemia due to chronic blood loss D50.0 CBC with platelets   2. Essential hypertension with goal blood pressure less than 140/90 I10 atenolol (TENORMIN) 50 MG tablet     amLODIPine (NORVASC) 2.5 MG tablet     Basic metabolic panel  (Ca, Cl, CO2, Creat, Gluc, K, Na, BUN)   3. DDD (degenerative disc disease), lumbar M51.36 cyclobenzaprine (FLEXERIL) 10 MG tablet   4. Anxiety F41.9 hydrOXYzine (ATARAX) 25 MG tablet     busPIRone HCl (BUSPAR) 30 MG tablet          FUTURE APPOINTMENTS:       - Follow-up visit in 1 month for physical, fasting labs and BP recheck.     Patient Instructions     Patient Education     Iron-Deficiency Anemia (Adult)  Red blood cells carry oxygen to the tissues of your body. Anemia is a condition in which you have too few red blood cells. You need iron to make red cells. Anemia makes you feel tired and run down. When anemia becomes severe, your skin becomes pale. You may feel short of breath after physical activity. Other symptoms include:    Headaches    Dizziness    Leg cramps with physical activity    Drowsiness    Restless legs  Your anemia is caused by not having enough iron in your body. This may be because of:    Loss of blood. This can be caused by heavy menstrual periods. It can also be caused by bleeding from the stomach or intestines.    Poor diet. You may not be eating enough foods that contain iron.    Inability to absorb iron from the foods you eat    Pregnancy  If your blood count is low enough, your healthcare provider may prescribe an iron supplement. It usually takes about 2 to 3 months of treatment with iron supplements to correct anemia. Severe cases of anemia need a blood transfusion to quickly ease symptoms and deliver more oxygen to the cells.  Home care  Follow these guidelines when caring for yourself at  home:    Eat foods high in iron. This will boost the amount of iron stored in your body. It is a natural way to build up the number of blood cells. Good sources of iron include beef, liver, spinach and other dark green leafy vegetables, whole grains, beans, and nuts.    Don't overexert yourself.    Talk with your healthcare provider before traveling by air or traveling to high altitudes.  Follow-up care  Follow up with your healthcare provider in 2 months, or as advised. This is to have another red blood cell count to be sure your anemia has been fixed.  Call 911  Call 911 or seek immediate medical care if any of these occur:    Shortness of breath or chest pain    Dizziness or fainting    Vomiting blood or passing red or black-colored stool   Date Last Reviewed: 3/1/2018    5399-9244 [employer]. [address] [place] [address]. All rights reserved. This information is not intended as a substitute for professional medical care. Always follow your healthcare professional's instructions.               Return in about 4 weeks (around 8/20/2019) for Routine Visit, Lab Work, BP Recheck.    RON Lora [facility]

## 2019-07-25 ENCOUNTER — TELEPHONE (OUTPATIENT)
Dept: FAMILY MEDICINE | Facility: CLINIC | Age: 59
End: 2019-07-25

## 2019-07-25 DIAGNOSIS — E87.1 HYPONATREMIA: Primary | ICD-10-CM

## 2019-07-25 DIAGNOSIS — E87.5 HYPERKALEMIA: ICD-10-CM

## 2019-07-25 NOTE — TELEPHONE ENCOUNTER
Can you please notify Mihaela that her sodium is low again, her potassium is elevated. I would like her to have these rechecked next week. The hemoglobin is up to 11.2. Thank you. RON Lopez CNP

## 2019-07-30 DIAGNOSIS — E87.5 HYPERKALEMIA: ICD-10-CM

## 2019-07-30 DIAGNOSIS — E87.1 HYPONATREMIA: ICD-10-CM

## 2019-07-30 LAB
ANION GAP SERPL CALCULATED.3IONS-SCNC: 10 MMOL/L (ref 3–14)
BUN SERPL-MCNC: 24 MG/DL (ref 7–30)
CALCIUM SERPL-MCNC: 10.3 MG/DL (ref 8.5–10.1)
CHLORIDE SERPL-SCNC: 90 MMOL/L (ref 94–109)
CO2 SERPL-SCNC: 27 MMOL/L (ref 20–32)
CREAT SERPL-MCNC: 1.25 MG/DL (ref 0.52–1.04)
GFR SERPL CREATININE-BSD FRML MDRD: 47 ML/MIN/{1.73_M2}
GLUCOSE SERPL-MCNC: 108 MG/DL (ref 70–99)
POTASSIUM SERPL-SCNC: 4 MMOL/L (ref 3.4–5.3)
SODIUM SERPL-SCNC: 127 MMOL/L (ref 133–144)

## 2019-07-30 PROCEDURE — 36415 COLL VENOUS BLD VENIPUNCTURE: CPT | Performed by: NURSE PRACTITIONER

## 2019-07-30 PROCEDURE — 80048 BASIC METABOLIC PNL TOTAL CA: CPT | Performed by: NURSE PRACTITIONER

## 2019-07-30 NOTE — LETTER
[facility]  [address]  [place] [address]  Phone: [phone]      7/31/2019     Mihaela Vance  [address] [facility] [address]      Dear Mihaela:    Thank you for allowing me to participate in your care. Your recent test results were reviewed and listed below.      Your results are provided below for your review  Your Basic metabolic lab results:  Lab Results   Component Value Date     07/30/2019     (desirable 133-144) - SODIUM  Lab Results   Component Value Date    POTASSIUM 4.0 07/30/2019    (desirable 3.4-5.3)  Lab Results   Component Value Date     07/30/2019     (normal value without eating is below 100; concerning if greater than 126 while fasting) - GLUCOSE  Lab Results   Component Value Date    BUN 24 07/30/2019    (kidney function test and for hydration; desirable 7-30)  Lab Results   Component Value Date    CR 1.25 07/30/2019     (kidney function test; desirable 0.52-1.04) - CREATNINE  Lab Results   Component Value Date    SUE 10.3 07/30/2019     (desirable 8.5-10.4) - CALCIUM      Potassium is back down to normal. Kidney function has gone down as well as sodium slightly. Will recheck and discuss at your physical in a few weeks.            Thank you for choosing [facility]. As a result, please continue with the treatment plan discussed in the office. Return as discussed or sooner if symptoms worsen or fail to improve.     If you have any further questions or concerns, please do not hesitate to contact us.      Sincerely,        RON Lopez CNP

## 2019-07-31 NOTE — RESULT ENCOUNTER NOTE
Please send patient letter notifying of labs/imaging and include provider message.    Potassium is back down to normal. Kidney function has gone down as well as sodium slightly. Will recheck and discuss at your physical in a few weeks. RON Lopez CNP      Thanks,  RON Lopez CNP

## 2019-08-13 DIAGNOSIS — M51.369 DDD (DEGENERATIVE DISC DISEASE), LUMBAR: ICD-10-CM

## 2019-08-13 NOTE — TELEPHONE ENCOUNTER
"Requested Prescriptions   Pending Prescriptions Disp Refills     diclofenac (VOLTAREN) 1 % topical gel 60 g 3     Sig: Place 2 g onto the skin 4 times daily as needed for moderate pain   Last Written Prescription Date:  7/15/19  Last Fill Quantity: 60g,  # refills: 3   Last office visit: 7/23/2019 with prescribing provider:  Kylah Sánchez     Future Office Visit:   Next 5 appointments (look out 90 days)    Aug 23, 2019  9:20 AM CDT  PHYSICAL with RON Lora CNP  [facility] [address] [facility] [address]  [phone]             Topical Steroids and Nonsteroidals Protocol Passed - 8/13/2019  2:16 PM        Passed - Patient is age 6 or older        Passed - Authorizing prescriber's most recent note related to this medication read.     If refill request is for ophthalmic use, please forward request to provider for approval.          Passed - High potency steroid not ordered        Passed - Recent (12 mo) or future (30 days) visit within the authorizing provider's specialty     Patient had office visit in the last 12 months or has a visit in the next 30 days with authorizing provider or within the authorizing provider's specialty.  See \"Patient Info\" tab in inbasket, or \"Choose Columns\" in Meds & Orders section of the refill encounter.              Passed - Medication is active on med list          "

## 2019-09-20 ENCOUNTER — TELEPHONE (OUTPATIENT)
Dept: FAMILY MEDICINE | Facility: CLINIC | Age: 59
End: 2019-09-20

## 2019-09-20 NOTE — TELEPHONE ENCOUNTER
Pt called  to schedule lab appt and B/P check, refusing provider appt. since she doesn't have Insurance.  Encouraged provider appt, pt refused.  Lab orders?  Advise.  Gretta

## 2019-09-21 NOTE — TELEPHONE ENCOUNTER
She should come in for BP check, I don't feel comfortable ordering labs without seeing her given her past history. I can place a care coordination referral if she needs help with insurance. Thank you. RON Lopez CNP

## 2019-09-23 NOTE — TELEPHONE ENCOUNTER
LM to call clinic/RN.  Come in for free B/P check and discuss appt/labs/Insurance?  Pt was seen on 7/23/19 with abnormal lab results.  Labs done on 7/30/19 labs continue to be abnormal but better.  JOSÉ MIGUEL Isabel recommended f/u in clinic.  Appt scheduled for 10/1/19.  KpaIke

## 2019-10-04 ENCOUNTER — OFFICE VISIT (OUTPATIENT)
Dept: FAMILY MEDICINE | Facility: CLINIC | Age: 59
End: 2019-10-04
Payer: COMMERCIAL

## 2019-10-04 VITALS
OXYGEN SATURATION: 100 % | SYSTOLIC BLOOD PRESSURE: 102 MMHG | BODY MASS INDEX: 21.1 KG/M2 | HEIGHT: 68 IN | HEART RATE: 77 BPM | RESPIRATION RATE: 16 BRPM | WEIGHT: 139.2 LBS | DIASTOLIC BLOOD PRESSURE: 74 MMHG | TEMPERATURE: 97.3 F

## 2019-10-04 DIAGNOSIS — M54.50 MIDLINE LOW BACK PAIN WITHOUT SCIATICA, UNSPECIFIED CHRONICITY: ICD-10-CM

## 2019-10-04 DIAGNOSIS — Z00.00 ENCOUNTER FOR MEDICARE ANNUAL WELLNESS EXAM: ICD-10-CM

## 2019-10-04 DIAGNOSIS — I10 ESSENTIAL HYPERTENSION WITH GOAL BLOOD PRESSURE LESS THAN 140/90: ICD-10-CM

## 2019-10-04 DIAGNOSIS — D50.0 IRON DEFICIENCY ANEMIA DUE TO CHRONIC BLOOD LOSS: Primary | ICD-10-CM

## 2019-10-04 DIAGNOSIS — N18.30 CKD (CHRONIC KIDNEY DISEASE) STAGE 3, GFR 30-59 ML/MIN (H): ICD-10-CM

## 2019-10-04 LAB
ANION GAP SERPL CALCULATED.3IONS-SCNC: 9 MMOL/L (ref 3–14)
BUN SERPL-MCNC: 15 MG/DL (ref 7–30)
CALCIUM SERPL-MCNC: 10.1 MG/DL (ref 8.5–10.1)
CHLORIDE SERPL-SCNC: 92 MMOL/L (ref 94–109)
CO2 SERPL-SCNC: 26 MMOL/L (ref 20–32)
CREAT SERPL-MCNC: 1.13 MG/DL (ref 0.52–1.04)
ERYTHROCYTE [DISTWIDTH] IN BLOOD BY AUTOMATED COUNT: 15.4 % (ref 10–15)
FERRITIN SERPL-MCNC: 67 NG/ML (ref 8–252)
GFR SERPL CREATININE-BSD FRML MDRD: 53 ML/MIN/{1.73_M2}
GLUCOSE SERPL-MCNC: 119 MG/DL (ref 70–99)
HCT VFR BLD AUTO: 32.1 % (ref 35–47)
HGB BLD-MCNC: 10.4 G/DL (ref 11.7–15.7)
MCH RBC QN AUTO: 31 PG (ref 26.5–33)
MCHC RBC AUTO-ENTMCNC: 32.4 G/DL (ref 31.5–36.5)
MCV RBC AUTO: 96 FL (ref 78–100)
PLATELET # BLD AUTO: 437 10E9/L (ref 150–450)
POTASSIUM SERPL-SCNC: 3.9 MMOL/L (ref 3.4–5.3)
RBC # BLD AUTO: 3.36 10E12/L (ref 3.8–5.2)
SODIUM SERPL-SCNC: 127 MMOL/L (ref 133–144)
WBC # BLD AUTO: 5.7 10E9/L (ref 4–11)

## 2019-10-04 PROCEDURE — 80048 BASIC METABOLIC PNL TOTAL CA: CPT | Performed by: NURSE PRACTITIONER

## 2019-10-04 PROCEDURE — G0438 PPPS, INITIAL VISIT: HCPCS | Performed by: NURSE PRACTITIONER

## 2019-10-04 PROCEDURE — 36415 COLL VENOUS BLD VENIPUNCTURE: CPT | Performed by: NURSE PRACTITIONER

## 2019-10-04 PROCEDURE — 85027 COMPLETE CBC AUTOMATED: CPT | Performed by: NURSE PRACTITIONER

## 2019-10-04 PROCEDURE — 99213 OFFICE O/P EST LOW 20 MIN: CPT | Mod: 25 | Performed by: NURSE PRACTITIONER

## 2019-10-04 PROCEDURE — 82728 ASSAY OF FERRITIN: CPT | Performed by: NURSE PRACTITIONER

## 2019-10-04 RX ORDER — AMLODIPINE BESYLATE 2.5 MG/1
2.5 TABLET ORAL DAILY
Qty: 90 TABLET | Refills: 1 | Status: SHIPPED | OUTPATIENT
Start: 2019-10-04 | End: 2020-08-20

## 2019-10-04 ASSESSMENT — MIFFLIN-ST. JEOR: SCORE: 1249.91

## 2019-10-04 ASSESSMENT — ACTIVITIES OF DAILY LIVING (ADL): CURRENT_FUNCTION: NO ASSISTANCE NEEDED

## 2019-10-04 NOTE — LETTER
[facility]  [address]  [place] [address]  Phone: [phone]      10/7/2019     Mihaela Vance  [address] [facility] [address]      Dear Mihaela:    Thank you for allowing me to participate in your care. Your recent test results were reviewed and listed below.  Sodium has returned to a low value since you were discharged from the hospital and your kidney function has decreased. The hemoglobin is slightly lower than last check. Would advise against any NSAID use- no advil, aspirin, ibuprofen, naproxen, aleve, excedrin. The only thing ok to take as needed is Tylenol, no more than 2,000 mg in 24 hours. Make sure you are not drinking alcohol as this lowers the sodium levels. Avoid drinking too much fluids, this dilutes the sodium in your blood. Recommend recheck in 1 month with a lab only appointment.    Your results are provided below for your review  Results for orders placed or performed in visit on 10/04/19   Basic metabolic panel  (Ca, Cl, CO2, Creat, Gluc, K, Na, BUN)   Result Value Ref Range    Sodium 127 (L) 133 - 144 mmol/L    Potassium 3.9 3.4 - 5.3 mmol/L    Chloride 92 (L) 94 - 109 mmol/L    Carbon Dioxide 26 20 - 32 mmol/L    Anion Gap 9 3 - 14 mmol/L    Glucose 119 (H) 70 - 99 mg/dL    Urea Nitrogen 15 7 - 30 mg/dL    Creatinine 1.13 (H) 0.52 - 1.04 mg/dL    GFR Estimate 53 (L) >60 mL/min/[1.73_m2]    GFR Estimate If Black 61 >60 mL/min/[1.73_m2]    Calcium 10.1 8.5 - 10.1 mg/dL   CBC with platelets   Result Value Ref Range    WBC 5.7 4.0 - 11.0 10e9/L    RBC Count 3.36 (L) 3.8 - 5.2 10e12/L    Hemoglobin 10.4 (L) 11.7 - 15.7 g/dL    Hematocrit 32.1 (L) 35.0 - 47.0 %    MCV 96 78 - 100 fl    MCH 31.0 26.5 - 33.0 pg    MCHC 32.4 31.5 - 36.5 g/dL    RDW 15.4 (H) 10.0 - 15.0 %    Platelet Count 437 150 - 450 10e9/L   Ferritin   Result Value Ref Range    Ferritin 67 8 - 252 ng/mL     Thank you for choosing [facility]. As a result, please continue with the treatment  plan discussed in the office. Return as discussed or sooner if symptoms worsen or fail to improve.     If you have any further questions or concerns, please do not hesitate to contact us.    Sincerely,        RON Lopze CNP

## 2019-10-04 NOTE — PATIENT INSTRUCTIONS
Patient Education     Back Exercises: Abdominal Lift Brace with Marching    The abdominal lift brace with march strengthens your lower abdominal muscles, helping you keep your pelvis and back stable:    Lie on the floor with both knees bent. Put your feet flat on the floor and your arms by your sides. Tighten your abdominal muscles. Be sure to continue to breathe.    Lift one bent knee about 2 inches then return it to the floor and lift the other about 2 inches. Keep your abdominal muscles tight and continue to breathe. These motions should be slow and controlled without your pelvis rocking side to side.    Repeat 10 times.  Date Last Reviewed: 3/1/2018    0319-9592 [employer]. [address] [place] [address], [place] [address]. All rights reserved. This information is not intended as a substitute for professional medical care. Always follow your healthcare professional's instructions.           Patient Education     Understanding Lumbosacral Strain    Lumbosacral strain is a medical term for an injury that causes low back pain. The lumbosacral area (low back) is between the bottom of the ribcage and the top of the buttocks. A strain is tearing of muscles and tendons. These tears can be very small but still cause pain.  How a lumbosacral strain happens  Muscles and tendons connected to the spine can be strained in a number of ways:    Sitting or standing in the same position for long periods of time. This can harm the low back over time. Poor posture can make low back pain more likely.    Moving the muscles and tendons past their usual range of motion. This can cause a sudden injury. This can happen when you twist, bend over, or lift something heavy. Not using correct technique for sports or tasks like lifting can make back injury more likely.    Accidents or falls  Lumbosacral strain can be caused by other problems, but these are less common.  Symptoms of lumbosacral strain  Symptoms may include:    Pain in  the back, often on one side    Pain that gets worse with movement and gets better with rest    Inability to move as freely as usual    Swelling, slight redness, and skin warmth in the painful area  Treatment for lumbosacral strain  Low back pain often goes away by itself within several weeks. But it often comes back. Treatment focuses on reducing pain and avoiding further injury. Bed rest is usually not recommended for low back pain. Treatments may include:    Avoiding or changing the action that caused the problem. This helps prevent injuring the tissues again.    Prescription or over-the-counter pain medicines. These help reduce inflammation, swelling, and pain.    Cold or heat packs. These help reduce pain and swelling.    Stretching and other exercises. These improve flexibility and strength.    Physical therapy. This usually includes exercises and other treatments.    Injections of medicine. This may relieve symptoms.  If these treatments do not relieve symptoms, your healthcare provider may order imaging tests to learn more about the problem. Sometimes you may need surgery.  Possible complications of lumbosacral strain  If the cause of the pain is not addressed, symptoms may return or get worse. Follow your healthcare provider s instructions on lifestyle changes and treating your back.     When to call your healthcare provider  Call your healthcare provider right away if you have any of these:    Fever of 100.4 F (38 C) or higher, or as directed    Numbness, tingling, or weakness    Problems with bowel or bladder control, or problems having sex    Pain that does not go away, or gets worse    New symptoms   Date Last Reviewed: 3/10/2016    9385-4556 [employer]. [address] [place] [address], [place] [address]. All rights reserved. This information is not intended as a substitute for professional medical care. Always follow your healthcare professional's instructions.           Patient Education    Personalized Prevention Plan  You are due for the preventive services outlined below.  Your care team is available to assist you in scheduling these services.  If you have already completed any of these items, please share that information with your care team to update in your medical record.  Health Maintenance Due   Topic Date Due     Discuss Advance Care Planning  1960     Pneumococcal Vaccine (1 of 1 - PPSV23) 02/18/1978     Zoster (Shingles) Vaccine (1 of 2) 02/18/2009     Annual Wellness Visit  08/21/2019     Flu Vaccine (1) 09/01/2019     Your Health Risk Assessment indicates you feel you are not in good health    A healthy lifestyle helps keep the body fit and the mind alert. It helps protect you from disease, helps you fight disease, and helps prevent chronic disease (disease that doesn't go away) from getting worse. This is important as you get older and begin to notice twinges in muscles and joints and a decline in the strength and stamina you once took for granted. A healthy lifestyle includes good healthcare, good nutrition, weight control, recreation, and regular exercise. Avoid harmful substances and do what you can to keep safe. Another part of a healthy lifestyle is stay mentally active and socially involved.    Good healthcare     Have a wellness visit every year.     If you have new symptoms, let us know right away. Don't wait until the next checkup.     Take medicines exactly as prescribed and keep your medicines in a safe place. Tell us if your medicine causes problems.   Healthy diet and weight control     Eat 3 or 4 small, nutritious, low-fat, high-fiber meals a day. Include a variety of fruits, vegetables, and whole-grain foods.     Make sure you get enough calcium in your diet. Calcium, vitamin D, and exercise help prevent osteoporosis (bone thinning).     If you live alone, try eating with others when you can. That way you get a good meal and have company while you eat it.     Try  to keep a healthy weight. If you eat more calories than your body uses for energy, it will be stored as fat and you will gain weight.     Recreation   Recreation is not limited to sports and team events. It includes any activity that provides relaxation, interest, enjoyment, and exercise. Recreation provides an outlet for physical, mental, and social energy. It can give a sense of worth and achievement. It can help you stay healthy.    Mental Exercise and Social Involvement  Mental and emotional health is as important as physical health. Keep in touch with friends and family. Stay as active as possible. Continue to learn and challenge yourself.   Things you can do to stay mentally active are:    Learn something new, like a foreign language or musical instrument.     Play SCRABBLE or do crossword puzzles. If you cannot find people to play these games with you at home, you can play them with others on your computer through the Internet.     Join a games club--anything from card games to chess or checkers or lawn bowling.     Start a new hobby.     Go back to school.     Volunteer.     Read.   Keep up with world events.    Understanding USDA MyPlate  The USDA (U.S. Department of Agriculture) has guidelines to help you make healthy food choices. These are called MyPlate. MyPlate shows the food groups that make up healthy meals using the image of a place setting. Before you eat, think about the healthiest choices for what to put onto your plate or into your cup or bowl. To learn more about building a healthy plate, visit www.choosemyplate.gov.    The food groups    Fruits. Any fruit or 100% fruit juice counts as part of the Fruit Group. Fruits may be fresh, canned, frozen, or dried, and may be whole, cut-up, or pureed. Make half your plate fruits and vegetables.    Vegetables. Any vegetable or 100% vegetable juice counts as a member of the Vegetable Group. Vegetables may be fresh, frozen, canned, or dried. They can be  served raw or cooked and may be whole, cut-up, or mashed. Make half your plate fruits and vegetables.    Grains. All foods made from grains are part of the Grains Group. These include wheat, rice, oats, cornmeal, and barley such as bread, pasta, oatmeal, cereal, tortillas, and grits. Grains should be no more than a quarter of your plate. At least half of your grains should be whole grains.    Protein. This group includes meat, poultry, seafood, beans and peas, eggs, processed soy products (like tofu), nuts (including nut butters), and seeds. Make protein choices no more than a quarter of your plate. Meat and poultry choices should be lean or low fat.    Dairy. All fluid milk products and foods made from milk that contain calcium, like yogurt and cheese, are part of the Dairy Group. (Foods that have little calcium, such as cream, butter, and cream cheese, are not part of the group.) Most dairy choices should be low-fat or fat-free.    Oils. These are fats that are liquid at room temperature. They include canola, corn, olive, soybean, and sunflower oil. Foods that are mainly oil include mayonnaise, certain salad dressings, and soft margarines. You should have only 5 to 7 teaspoons of oils a day. You probably already get this much from the food you eat.  Date Last Reviewed: 8/1/2017 2000-2018 [employer]. [address] [place] [address]. All rights reserved. This information is not intended as a substitute for professional medical care. Always follow your healthcare professional's instructions.          Signs of Hearing Loss     Hearing much better with one ear can be a sign of hearing loss.     Hearing loss is a problem shared by many people. In fact, it is one of the most common health conditions, particularly as people age. Most people over age 65 have some hearing loss, and by age 80, almost everyone does. Because hearing loss usually occurs slowly over the years, you may not realize your  hearing ability has gotten worse.  Have your hearing checked  Contact your healthcare provider if you:    Have to strain to hear normal conversation    Have to watch other people s faces very carefully to follow what they re saying    Need to ask people to repeat what they ve said    Often misunderstand what people are saying    Turn the volume of the television or radio up so high that others complain    Feel that people are mumbling when they re talking to you    Find that the effort to hear leaves you feeling tired and irritated    Notice, when using the phone, that you hear better with one ear than the other  Date Last Reviewed: 12/1/2016 2000-2018 [employer]. [address] [place] [address]. All rights reserved. This information is not intended as a substitute for professional medical care. Always follow your healthcare professional's instructions.          Urinary Incontinence, Female (Adult)  Urinary incontinence means loss of control of the bladder. This problem affects many women, especially as they get older. If you have incontinence, you may be embarrassed to ask for help. But know that this problem can be treated.  Types of Incontinence  There are different types of incontinence. Two of the main types are described here. You can have more than one type.    Stress incontinence. With this type, urine leaks when pressure (stress) is put on the bladder. This may happen when you cough, sneeze, or laugh. Stress incontinence most often occurs because the pelvic floor muscles that support the bladder and urethra are weak. This can happen after pregnancy and vaginal childbirth or a hysterectomy. It can also be due to excess body weight or hormone changes.    Urge incontinence (also called overactive bladder). With this type, a sudden urge to urinate is felt often. This may happen even though there may not be much urine in the bladder. The need to urinate often during the night is  common. Urge incontinence most often occurs because of bladder spasms. This may be due to bladder irritation or infection. Damage to bladder nerves or pelvic muscles, constipation, and certain medicines can also lead to urge incontinence.  Treatment of urinary incontinence depends on the cause. Further evaluation is needed to find the type you have. This will likely include an exam and certain tests. Based on the results, you and your healthcare provider can then plan treatment. Until a diagnosis is made, the home care tips below can help relieve symptoms.  Home care    Do pelvic floor muscle exercises, if they are prescribed. The pelvic floor muscles help support the bladder and urethra. Many women find that their symptoms improve when doing special exercises that strengthen these muscles. To do the exercises contract the muscles you would use to stop your stream of urine, but do this when you re not urinating. Hold for 10 seconds, then relax. Repeat 10 to 20 times in a row, at least 3 times a day. Your provider may give you other instructions for how to do the exercises and how often.    Keep a bladder diary. This helps track how often and how much you urinate over a set period of time. Bring this diary with you to your next visit with the provider. The information can help your provider learn more about your bladder problem.    Lose weight, if advised to by your provider. Excess weight puts pressure on the bladder. Your provider can help you create a weight-loss plan that s right for you. This may include exercising more and making certain diet changes.    Don't consume foods and drinks that may irritate the bladder. These can include alcohol and caffeinated drinks.    Quit smoking. Smoking and other tobacco use can lead to chronic cough that strains the pelvic floor muscles. Smoking may also damage the bladder and urethra. Talk with your provider about treatments or methods you can use to quit smoking.    If  drinking large amounts of fluid causes you to have symptoms, you may be advised to limit your fluid intake. You may also be advised to drink most of your fluids during the day and to limit fluids at night.    If you re worried about urine leakage or accidents, you may wear absorbent pads to catch urine. Change the pads often. This helps reduce discomfort. It may also reduce the risk of skin or bladder infections.  Follow-up care  Follow up with your healthcare provider, or as directed. It may take some to find the right treatment for your problem. Your treatment plan may include special therapies or medicines. Certain procedures or surgery may also be options. Be sure to discuss any questions you have with your provider.  When to seek medical advice  Call the healthcare provider right away if any of these occur:    Fever of 100.4 F (38 C) or higher, or as directed by your provider    Bladder pain or fullness    Abdominal swelling    Nausea or vomiting    Back pain    Weakness, dizziness or fainting  Date Last Reviewed: 10/1/2017    1145-5414 [employer]. [address] [place] [address]. All rights reserved. This information is not intended as a substitute for professional medical care. Always follow your healthcare professional's instructions.          Preventing Falls in the Home  An adult or child can fall for many reasons. If you are an older adult, you may fall because your reaction time slows down. Your muscles and joints may get stiff, weak, or less flexible because of illness, medicines, or a physical condition. These things can also make a child more likely to fall or be injured in a fall.  Other health problems that make falls more likely include:    Arthritis    Dizziness or lightheadedness when you get out of bed (orthostatic hypotension)    History of a stroke    Dizziness    Anemia    Certain medicines taken for mental illness    Problems with balance or gait    History of falls  with or without an injury    Changes in vision (vision impairment)    Changes in thinking skills and memory (cognitive impairment)  Injuries from a fall can include broken bones, dislocated joints, and cuts. When these injuries are serious enough, they can make it impossible for you or a child who is injured in a fall to live on his or her own.  Prevention tips  To help prevent falls and fall-related injuries, follow the tips below.   Floors  Make floors safer by doing the following:     Put nonskid pads under area rugs.    Remove throw rugs.    Replace worn floor coverings.    Tack carpets firmly to each step on carpeted stairs. Put nonskid strips on the edges of uncarpeted stairs.    Keep floors and stairs free of clutter and cords.    Arrange furniture so there are clear pathways.    Clean up any spills right away.    Wear shoes that fit.  Bathrooms    Make bathrooms safer by doing the following:     Install grab bars in the tub or shower.    Apply nonskid strips or put a nonskid rubber mat in the tub or shower.    Sit on a bath chair to bathe.    Use bathmats with nonskid backing.  Lighting and the environment  Improve lighting in your home by doing the following:     Keep a flashlight in each room. Or put a lamp next to the bed within easy reach.    Put nightlights in the bedrooms, hallways, kitchen, and bathrooms.    Make sure all stairways have good lighting.    Take your time when going up and down stairs.    Put handrails on both sides of stairs and in walkways for more support. To prevent injury to your wrist or arm, don t use handrails to pull yourself up.    Install grab bars to pull yourself up.    Move or rearrange items that you use often. This will make them easier to find or reach.    Look at your home to find any safety hazards. Especially look at doorways, walkways, and the driveway. Remove or repair any safety problems that you find.  Date Last Reviewed: 8/1/2016 2000-2018 [employer]. [address] [place] [address], [place] [address]. All rights reserved. This information is not intended as a substitute for professional medical care. Always follow your healthcare professional's instructions.

## 2019-10-04 NOTE — PROGRESS NOTES
"SUBJECTIVE:   Mihaela Vance is a 59 year old female who presents for Preventive Visit.    Are you in the first 12 months of your Medicare coverage?  No    Healthy Habits:    In general, how would you rate your overall health?  Fair    Frequency of exercise:  6-7 days/week    Duration of exercise:  Less than 15 minutes    Do you usually eat at least 4 servings of fruit and vegetables a day, include whole grains    & fiber and avoid regularly eating high fat or \"junk\" foods?  No    Taking medications regularly:  Yes    Barriers to taking medications:  None    Medication side effects:  None    Ability to successfully perform activities of daily living:  No assistance needed    Home Safety:  No safety concerns identified    Hearing Impairment:  Difficulty following a conversation in a noisy restaurant or crowded room, need to ask people to speak up or repeat themselves and difficulty understanding soft or whispered speech    In the past 6 months, have you been bothered by leaking of urine? Yes    In general, how would you rate your overall mental or emotional health?  Good      PHQ-2 Total Score:    Additional concerns today:  Yes (would like handicapped parking)    Do you feel safe in your environment? Yes    Do you have a Health Care Directive? Yes: Patient states has Advance Directive and will bring in a copy to clinic.    Fall risk  Fallen 2 or more times in the past year?: Yes  Any fall with injury in the past year?: No     Cognitive Screening   1) Repeat 3 items (Leader, Season, Table)    2) Clock draw: NORMAL  3) 3 item recall: Recalls 3 objects  Results: NORMAL clock, 3 items recalled: COGNITIVE IMPAIRMENT LESS LIKELY    Mini-CogTM Copyright ROSHNI Delaney. Licensed by the author for use in [facility]; reprinted with permission (denisha@.[facility]). All rights reserved.      Do you have sleep apnea, excessive snoring or daytime drowsiness?: no    Reviewed and updated as needed this visit by clinical " staff  Tobacco  Allergies  Meds  Med Hx  Surg Hx  Fam Hx  Soc Hx        Reviewed and updated as needed this visit by Provider        Social History     Tobacco Use     Smoking status: Never Smoker     Smokeless tobacco: Never Used   Substance Use Topics     Alcohol use: Yes     Comment: 2 days per week 3-4 drinks      If you drink alcohol do you typically have >3 drinks per day or >7 drinks per week? No      PROBLEMS TO ADD ON...  Back Pain       Duration: weeks        Specific cause: sitting too long    Description:   Location of pain: low back midline  Character of pain: dull ache  Pain radiation:none  New numbness or weakness in legs, not attributed to pain:  no     Intensity: moderate    History:   Pain interferes with job: Not applicable  History of back problems: previous herniated disc  Any previous MRI or X-rays: Yes--at [facility].  Date 2014  Sees a specialist for back pain:  No  Therapies tried without relief: cold  Hx of GIB and anemia- denies syncope, dizziness, dyspnea, melena or bloody stools.    Alleviating factors:   Improved by: NSAIDs      Precipitating factors:  Worsened by: Sitting          Accompanying Signs & Symptoms:  Risk of Fracture:  None  Risk of Cauda Equina:  None  Risk of Infection:  None  Risk of Cancer:  None  Risk of Ankylosing Spondylitis:  Onset at age <35, male, AND morning back stiffness. no            Current providers sharing in care for this patient include:   Patient Care Team:  Kylah Sánchez APRN CNP as PCP - General (Nurse Practitioner)  Ralph Aviles MD as MD (Hematology & Oncology)  Ashlie Chan RN as  (Hematology)  Ivan Wu MD as Assigned PCP    The following health maintenance items are reviewed in Epic and correct as of today:  Health Maintenance   Topic Date Due     ADVANCE CARE PLANNING  1960     PNEUMOCOCCAL IMMUNIZATION 19-64 MEDIUM RISK (1 of 1 - PPSV23) 02/18/1978     ZOSTER IMMUNIZATION (1 of 2) 02/18/2009      "MEDICARE ANNUAL WELLNESS VISIT  08/21/2019     INFLUENZA VACCINE (1) 09/01/2019     HPV  07/21/2020     MAMMO SCREENING  09/05/2020     PAP  07/21/2022     LIPID  08/22/2023     COLONOSCOPY  06/19/2024     DTAP/TDAP/TD IMMUNIZATION (3 - Td) 11/26/2024     HEPATITIS C SCREENING  Completed     HIV SCREENING  Completed     PHQ-2  Completed     IPV IMMUNIZATION  Aged Out     MENINGITIS IMMUNIZATION  Aged Out     BP Readings from Last 3 Encounters:   10/04/19 102/74   07/23/19 (!) 152/102   06/19/19 (!) 167/97    Wt Readings from Last 3 Encounters:   10/04/19 63.1 kg (139 lb 3.2 oz)   07/23/19 64.5 kg (142 lb 3.2 oz)   06/19/19 62.6 kg (138 lb)                  Pneumonia Vaccine:Adults age 65+ who have not received previous Pneumovax (PPSV23) or PCV13 as an adult: Should first be given PCV13 AND then should be given PPSV23 6-12 months after PCV13  Mammogram Screening: Mammogram Screening: Patient over age 50, mutual decision to screen reflected in health maintenance.  Last 3 Pap and HPV Results:   PAP / HPV Latest Ref Rng & Units 7/21/2017 11/26/2014 12/22/2011   PAP - NIL NIL NIL   HPV 16 DNA NEG Negative - -   HPV 18 DNA NEG Negative - -   OTHER HR HPV NEG Negative - -       Review of Systems  Constitutional, HEENT, cardiovascular, pulmonary, gi and gu systems are negative, except as otherwise noted.    OBJECTIVE:   /74 (BP Location: Right arm, Patient Position: Sitting, Cuff Size: Adult Regular)   Pulse 77   Temp 97.3  F (36.3  C) (Tympanic)   Resp 16   Ht 1.727 m (5' 8\")   Wt 63.1 kg (139 lb 3.2 oz)   LMP 09/16/2006 (Exact Date)   SpO2 100%   BMI 21.17 kg/m   Estimated body mass index is 21.17 kg/m  as calculated from the following:    Height as of this encounter: 1.727 m (5' 8\").    Weight as of this encounter: 63.1 kg (139 lb 3.2 oz).  Physical Exam  GENERAL: alert, no distress and appears older than stated age  EYES: Eyes grossly normal to inspection, PERRL and conjunctivae and sclerae normal  NECK: " no adenopathy, no asymmetry, masses, or scars and thyroid normal to palpation  RESP: lungs clear to auscultation - no rales, rhonchi or wheezes  CV: regular rate and rhythm, normal S1 S2, no S3 or S4, no murmur, click or rub, no peripheral edema and peripheral pulses strong  ABDOMEN: soft, nontender, no hepatosplenomegaly, no masses and bowel sounds normal  MS: no gross musculoskeletal defects noted, no edema. Spine with normal ROM, tender to midline, negative leg raise bilateral, normal strength, normal gait.   NEURO: Normal strength and tone, mentation intact and speech normal    Diagnostic Test Results:  Labs reviewed in Epic    ASSESSMENT / PLAN:       ICD-10-CM    1. Iron deficiency anemia due to chronic blood loss D50.0 Ferritin   2. Essential hypertension with goal blood pressure less than 140/90 I10 Basic metabolic panel  (Ca, Cl, CO2, Creat, Gluc, K, Na, BUN)     amLODIPine (NORVASC) 2.5 MG tablet   3. CKD (chronic kidney disease) stage 3, GFR 30-59 ml/min (H) N18.3 Basic metabolic panel  (Ca, Cl, CO2, Creat, Gluc, K, Na, BUN)     CBC with platelets     Ferritin   4. Encounter for Medicare annual wellness exam Z00.00    5. Midline low back pain without sciatica, unspecified chronicity M54.5        End of Life Planning:  Patient currently has an advanced directive: Patient to bring in.    COUNSELING:  Reviewed preventive health counseling, as reflected in patient instructions  Special attention given to:       Regular exercise       Healthy diet/nutrition       Immunizations    Declined: Influenza, Pneumococcal and Zoster due to Cost        Given exercise for back, patient declines imaging and physical therapy. Instructed not to use nsaids due to decreased kidney function. Ice QID. HTN controlled, continue current meds. Labs for anemia and CKD pending.         Alcohol Use       Osteoporosis Prevention/Bone Health    Estimated body mass index is 21.17 kg/m  as calculated from the following:    Height as of  "this encounter: 1.727 m (5' 8\").    Weight as of this encounter: 63.1 kg (139 lb 3.2 oz).         reports that she has never smoked. She has never used smokeless tobacco.      Appropriate preventive services were discussed with this patient, including applicable screening as appropriate for cardiovascular disease, diabetes, osteopenia/osteoporosis, and glaucoma.  As appropriate for age/gender, discussed screening for colorectal cancer, prostate cancer, breast cancer, and cervical cancer. Checklist reviewing preventive services available has been given to the patient.    Reviewed patients plan of care and provided an AVS. The Intermediate Care Plan ( asthma action plan, low back pain action plan, and migraine action plan) for Mihaela meets the Care Plan requirement. This Care Plan has been established and reviewed with the Patient.    Counseling Resources:  ATP IV Guidelines  Pooled Cohorts Equation Calculator  Breast Cancer Risk Calculator  FRAX Risk Assessment  ICSI Preventive Guidelines  Dietary Guidelines for Americans, 2010  [employer]'s MyPlate  ASA Prophylaxis  Lung CA Screening    RON Lora CNP  [facility] [place]    Identified Health Risks:    The patient was provided with suggestions to help her develop a healthy physical lifestyle.  The patient was counseled and encouraged to consider modifying their diet and eating habits. She was provided with information on recommended healthy diet options.  The patient was provided with written information regarding signs of hearing loss.  Information on urinary incontinence and treatment options given to patient.  She is at risk for falling and has been provided with information to reduce the risk of falling at home.  "

## 2019-10-07 DIAGNOSIS — N18.30 CKD (CHRONIC KIDNEY DISEASE) STAGE 3, GFR 30-59 ML/MIN (H): Primary | ICD-10-CM

## 2019-10-07 DIAGNOSIS — E87.1 HYPONATREMIA: ICD-10-CM

## 2019-10-07 DIAGNOSIS — K92.2 GASTROINTESTINAL HEMORRHAGE, UNSPECIFIED GASTROINTESTINAL HEMORRHAGE TYPE: ICD-10-CM

## 2019-10-07 NOTE — RESULT ENCOUNTER NOTE
Please send patient letter notifying of labs and provider message.    Sodium has returned to a low value since you were discharged from the hospital and your kidney function has decreased. The hemoglobin is slightly lower than last check. Would advise against any NSAID use- no advil, aspirin, ibuprofen, naproxen, aleve, excedrin. The only thing ok to take as needed is Tylenol, no more than 2,000 mg in 24 hours. Make sure you are not drinking alcohol as this lowers the sodium levels. Avoid drinking too much fluids, this dilutes the sodium in your blood. Recommend recheck in 1 month with a lab only appointment. Please let us know if you have any questions.      Thanks,  RON Lopez CNP

## 2020-01-10 ENCOUNTER — HOSPITAL ENCOUNTER (EMERGENCY)
Facility: CLINIC | Age: 60
Discharge: HOME | End: 2020-01-10
Attending: PHYSICIAN ASSISTANT | Admitting: PHYSICIAN ASSISTANT
Payer: COMMERCIAL

## 2020-01-10 ENCOUNTER — APPOINTMENT (OUTPATIENT)
Dept: GENERAL RADIOLOGY | Facility: CLINIC | Age: 60
End: 2020-01-10
Attending: PHYSICIAN ASSISTANT
Payer: COMMERCIAL

## 2020-01-10 VITALS — OXYGEN SATURATION: 99 % | SYSTOLIC BLOOD PRESSURE: 135 MMHG | DIASTOLIC BLOOD PRESSURE: 84 MMHG

## 2020-01-10 DIAGNOSIS — Z23 INFLUENZA VACCINATION ADMINISTERED AT CURRENT VISIT: ICD-10-CM

## 2020-01-10 DIAGNOSIS — D64.9 LOW HEMOGLOBIN: ICD-10-CM

## 2020-01-10 DIAGNOSIS — S42.212A FX HUMERAL NECK, LEFT, CLOSED, INITIAL ENCOUNTER: ICD-10-CM

## 2020-01-10 LAB
BASOPHILS # BLD AUTO: 0.1 10E9/L (ref 0–0.2)
BASOPHILS NFR BLD AUTO: 0.8 %
DIFFERENTIAL METHOD BLD: ABNORMAL
EOSINOPHIL # BLD AUTO: 0.2 10E9/L (ref 0–0.7)
EOSINOPHIL NFR BLD AUTO: 2.2 %
ERYTHROCYTE [DISTWIDTH] IN BLOOD BY AUTOMATED COUNT: 17.3 % (ref 10–15)
HCT VFR BLD AUTO: 30.5 % (ref 35–47)
HGB BLD-MCNC: 9.7 G/DL (ref 11.7–15.7)
IMM GRANULOCYTES # BLD: 0.1 10E9/L (ref 0–0.4)
IMM GRANULOCYTES NFR BLD: 0.5 %
LYMPHOCYTES # BLD AUTO: 1.1 10E9/L (ref 0.8–5.3)
LYMPHOCYTES NFR BLD AUTO: 11.6 %
MCH RBC QN AUTO: 31.5 PG (ref 26.5–33)
MCHC RBC AUTO-ENTMCNC: 31.8 G/DL (ref 31.5–36.5)
MCV RBC AUTO: 99 FL (ref 78–100)
MONOCYTES # BLD AUTO: 1 10E9/L (ref 0–1.3)
MONOCYTES NFR BLD AUTO: 10 %
NEUTROPHILS # BLD AUTO: 7.1 10E9/L (ref 1.6–8.3)
NEUTROPHILS NFR BLD AUTO: 74.9 %
NRBC # BLD AUTO: 0 10*3/UL
NRBC BLD AUTO-RTO: 0 /100
PLATELET # BLD AUTO: 322 10E9/L (ref 150–450)
RBC # BLD AUTO: 3.08 10E12/L (ref 3.8–5.2)
WBC # BLD AUTO: 9.5 10E9/L (ref 4–11)

## 2020-01-10 PROCEDURE — 25000128 H RX IP 250 OP 636: Performed by: PHYSICIAN ASSISTANT

## 2020-01-10 PROCEDURE — 85025 COMPLETE CBC W/AUTO DIFF WBC: CPT | Performed by: PHYSICIAN ASSISTANT

## 2020-01-10 PROCEDURE — 90682 RIV4 VACC RECOMBINANT DNA IM: CPT | Performed by: PHYSICIAN ASSISTANT

## 2020-01-10 PROCEDURE — 99213 OFFICE O/P EST LOW 20 MIN: CPT | Mod: 25 | Performed by: PHYSICIAN ASSISTANT

## 2020-01-10 PROCEDURE — G0008 ADMIN INFLUENZA VIRUS VAC: HCPCS | Performed by: PHYSICIAN ASSISTANT

## 2020-01-10 PROCEDURE — 23600 CLTX PROX HUMRL FX W/O MNPJ: CPT | Mod: LT | Performed by: PHYSICIAN ASSISTANT

## 2020-01-10 PROCEDURE — G0463 HOSPITAL OUTPT CLINIC VISIT: HCPCS | Mod: 25 | Performed by: PHYSICIAN ASSISTANT

## 2020-01-10 PROCEDURE — 73030 X-RAY EXAM OF SHOULDER: CPT | Mod: LT

## 2020-01-10 PROCEDURE — 73060 X-RAY EXAM OF HUMERUS: CPT | Mod: LT

## 2020-01-10 RX ORDER — HYDROCODONE BITARTRATE AND ACETAMINOPHEN 5; 325 MG/1; MG/1
1 TABLET ORAL EVERY 6 HOURS PRN
Qty: 20 TABLET | Refills: 0 | Status: ON HOLD | OUTPATIENT
Start: 2020-01-10 | End: 2022-01-27

## 2020-01-10 RX ADMIN — INFLUENZA A VIRUS A/BRISBANE/02/2018 (H1N1) RECOMBINANT HEMAGGLUTININ ANTIGEN, INFLUENZA A VIRUS A/KANSAS/14/2017 (H3N2) RECOMBINANT HEMAGGLUTININ ANTIGEN, INFLUENZA B VIRUS B/PHUKET/3073/2013 RECOMBINANT HEMAGGLUTININ ANTIGEN, AND INFLUENZA B VIRUS B/MARYLAND/15/2016 RECOMBINANT HEMAGGLUTININ ANTIGEN 0.5 ML: 45; 45; 45; 45 INJECTION INTRAMUSCULAR at 13:18

## 2020-01-10 ASSESSMENT — ENCOUNTER SYMPTOMS
DIZZINESS: 0
CONFUSION: 0
NECK PAIN: 0
CARDIOVASCULAR NEGATIVE: 1
NUMBNESS: 0
COLOR CHANGE: 1
AGITATION: 0
WEAKNESS: 0
GASTROINTESTINAL NEGATIVE: 1
CONSTITUTIONAL NEGATIVE: 1
FACIAL ASYMMETRY: 0
RESPIRATORY NEGATIVE: 1
LIGHT-HEADEDNESS: 0
HEADACHES: 0
EYES NEGATIVE: 1
SPEECH DIFFICULTY: 0
NECK STIFFNESS: 0

## 2020-01-10 NOTE — ED AVS SNAPSHOT
[facility] Emergency Department  [address] [address]  Phone:  [phone]  Fax:  [phone]                                    Mihaela Vance   MRN: [id number]    Department:  [facility] Emergency Department   Date of Visit:  1/10/2020           After Visit Summary Signature Page    I have received my discharge instructions, and my questions have been answered. I have discussed any challenges I see with this plan with the nurse or doctor.    ..........................................................................................................................................  Patient/Patient Representative Signature      ..........................................................................................................................................  Patient Representative Print Name and Relationship to Patient    ..................................................               ................................................  Date                                   Time    ..........................................................................................................................................  Reviewed by Signature/Title    ...................................................              ..............................................  Date                                               Time          22EPIC Rev 08/18

## 2020-01-10 NOTE — DISCHARGE INSTRUCTIONS
Ice, rest, elevate, Tylenol and ibuprofen over-the-counter as needed for pain.     Patient given prescription Norco for breakthrough pain.  Side effects of drowsiness discussed with patient.  Patient not to drive within 8 hours of taking medication.  No operating machinery with this medication.  There is Tylenol in this medication so please make sure that you are adding the Tylenol dose to your total daily intake to prevent taking more than the recommended daily amount.    Discussed with patient that orthopedic specialist recommends wearing sling with daily range of motion in the elbow and slight pendulum swinging of the shoulder to prevent stiff joints a few times a day.      Patient follow-up with orthopedic specialist in the next 1 to 2 weeks for recheck.    Patient to return to the emergency department if pain out of portion, numbness, change in symptoms occur.
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
## 2020-02-06 ENCOUNTER — HOSPITAL ENCOUNTER (OUTPATIENT)
Dept: PHYSICAL THERAPY | Facility: CLINIC | Age: 60
Setting detail: THERAPIES SERIES
End: 2020-02-06
Attending: ORTHOPAEDIC SURGERY
Payer: COMMERCIAL

## 2020-02-06 PROCEDURE — 97161 PT EVAL LOW COMPLEX 20 MIN: CPT | Mod: GP | Performed by: PHYSICAL THERAPIST
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
## 2020-02-27 ENCOUNTER — HOSPITAL ENCOUNTER (OUTPATIENT)
Dept: PHYSICAL THERAPY | Facility: CLINIC | Age: 60
Setting detail: THERAPIES SERIES
End: 2020-02-27
Attending: ORTHOPAEDIC SURGERY
Payer: COMMERCIAL

## 2020-02-27 PROCEDURE — 97110 THERAPEUTIC EXERCISES: CPT | Mod: GP | Performed by: PHYSICAL THERAPIST

## 2020-03-16 ENCOUNTER — HOSPITAL ENCOUNTER (OUTPATIENT)
Dept: PHYSICAL THERAPY | Facility: CLINIC | Age: 60
Setting detail: THERAPIES SERIES
End: 2020-03-16
Attending: ORTHOPAEDIC SURGERY
Payer: COMMERCIAL

## 2020-03-16 PROCEDURE — 97110 THERAPEUTIC EXERCISES: CPT | Mod: GP | Performed by: PHYSICAL THERAPIST

## 2020-03-26 DIAGNOSIS — N18.30 CKD (CHRONIC KIDNEY DISEASE) STAGE 3, GFR 30-59 ML/MIN (H): ICD-10-CM

## 2020-03-26 DIAGNOSIS — E87.1 HYPONATREMIA: ICD-10-CM

## 2020-03-26 DIAGNOSIS — K92.2 GASTROINTESTINAL HEMORRHAGE, UNSPECIFIED GASTROINTESTINAL HEMORRHAGE TYPE: ICD-10-CM

## 2020-03-26 LAB
ANION GAP SERPL CALCULATED.3IONS-SCNC: 5 MMOL/L (ref 3–14)
BUN SERPL-MCNC: 17 MG/DL (ref 7–30)
CALCIUM SERPL-MCNC: 9.5 MG/DL (ref 8.5–10.1)
CHLORIDE SERPL-SCNC: 95 MMOL/L (ref 94–109)
CO2 SERPL-SCNC: 30 MMOL/L (ref 20–32)
CREAT SERPL-MCNC: 1.21 MG/DL (ref 0.52–1.04)
GFR SERPL CREATININE-BSD FRML MDRD: 48 ML/MIN/{1.73_M2}
GLUCOSE SERPL-MCNC: 118 MG/DL (ref 70–99)
HGB BLD-MCNC: 10.6 G/DL (ref 11.7–15.7)
POTASSIUM SERPL-SCNC: 4.3 MMOL/L (ref 3.4–5.3)
SODIUM SERPL-SCNC: 130 MMOL/L (ref 133–144)

## 2020-03-26 PROCEDURE — 85018 HEMOGLOBIN: CPT | Performed by: NURSE PRACTITIONER

## 2020-03-26 PROCEDURE — 36415 COLL VENOUS BLD VENIPUNCTURE: CPT | Performed by: NURSE PRACTITIONER

## 2020-03-26 PROCEDURE — 80048 BASIC METABOLIC PNL TOTAL CA: CPT | Performed by: NURSE PRACTITIONER

## 2020-03-26 NOTE — LETTER
March 27, 2020      Mihaela Vance  [id number] [place] [id number]        Dear ,    We are writing to inform you of your test results.        Hemoglobin, kidney function and low sodium is stable.  Resulted Orders   **Basic metabolic panel FUTURE anytime   Result Value Ref Range    Sodium 130 (L) 133 - 144 mmol/L    Potassium 4.3 3.4 - 5.3 mmol/L    Chloride 95 94 - 109 mmol/L    Carbon Dioxide 30 20 - 32 mmol/L    Anion Gap 5 3 - 14 mmol/L    Glucose 118 (H) 70 - 99 mg/dL    Urea Nitrogen 17 7 - 30 mg/dL    Creatinine 1.21 (H) 0.52 - 1.04 mg/dL    GFR Estimate 48 (L) >60 mL/min/[1.73_m2]      Comment:      Non  GFR Calc  Starting 12/18/2018, serum creatinine based estimated GFR (eGFR) will be   calculated using the Chronic Kidney Disease Epidemiology Collaboration   (CKD-EPI) equation.      GFR Estimate If Black 56 (L) >60 mL/min/[1.73_m2]      Comment:       GFR Calc  Starting 12/18/2018, serum creatinine based estimated GFR (eGFR) will be   calculated using the Chronic Kidney Disease Epidemiology Collaboration   (CKD-EPI) equation.      Calcium 9.5 8.5 - 10.1 mg/dL   **Hemoglobin FUTURE anytime   Result Value Ref Range    Hemoglobin 10.6 (L) 11.7 - 15.7 g/dL       If you have any questions or concerns, please call the clinic at the number listed above.       Sincerely,        Kylah Sánchez, RON CNP/lre

## 2020-03-26 NOTE — PROGRESS NOTES
Outpatient Physical Therapy Discharge Note     Patient: Mihaela Vance  : 1960    Beginning/End Dates of Reporting Period:  20 to 3/26/2020    Referring Provider: Eleuterio Reed MD    Therapy Diagnosis: s/p left proximal humerus fracture     Client Self Report: Feels 80% better, just stiff at the very end of ROM and still weak.  Next PT appointment will probably be last.    Objective Measurements:  Objective Measure: left GH AAROM pulleys  Details: 150 deg; AROM=120  Objective Measure: Left GH AAROM ER  Details: 30 deg; AROM=30 deg            Goals:  Goal Identifier     Goal Description Patient will have >=160 deg of left GH PROM to reduce risk of adhesive capsulitis.   Target Date 20   Date Met      Progress:     Goal Identifier     Goal Description Patient will have >=140 deg of left GH ARoM to allow for normalized overhead ADL's.   Target Date 20   Date Met  20   Progress:     Goal Identifier     Goal Description Patient will have >=4/5 left GH ER to reduce impingement with overhead ADL's.   Target Date 20   Date Met      Progress:       Progress Toward Goals:   Progress this reporting period: Mihaela made excellent progress with overhead ROM, pain reduction, and return to normalized ADL's.  She is independent with her long term HEP and working toward final goal.      Plan:  Discharge from therapy.    Discharge:    Reason for Discharge: working independently with HEP to achieve final goals    Equipment Issued: theraband    Discharge Plan: Patient to continue home program.        Nan Metzger, PT, DTP, SCS  Doctor of Physical Therapy #[id number]  [facility]  [phone]  [email or web address]

## 2020-03-27 NOTE — RESULT ENCOUNTER NOTE
Please send patient letter notifying of labs and provider message.    Hemoglobin, kidney function and low sodium is stable. Please let us know if you have any questions.      Thanks,  RON Lopez CNP

## 2020-06-01 DIAGNOSIS — K92.2 GASTROINTESTINAL HEMORRHAGE, UNSPECIFIED GASTROINTESTINAL HEMORRHAGE TYPE: ICD-10-CM

## 2020-06-02 RX ORDER — OMEPRAZOLE 40 MG/1
CAPSULE, DELAYED RELEASE ORAL
Qty: 90 CAPSULE | Refills: 0 | Status: SHIPPED | OUTPATIENT
Start: 2020-06-02 | End: 2020-11-19

## 2020-06-02 NOTE — TELEPHONE ENCOUNTER
"Requested Prescriptions   Pending Prescriptions Disp Refills     omeprazole (PRILOSEC) 40 MG DR capsule [Pharmacy Med Name: OMEPRAZOLE 40 MG Capsule Delayed Release] 90 capsule 3     Sig: TAKE 1 CAPSULE EVERY DAY       PPI Protocol Passed - 6/1/2020  9:23 AM        Passed - Not on Clopidogrel (unless Pantoprazole ordered)        Passed - No diagnosis of osteoporosis on record        Passed - Recent (12 mo) or future (30 days) visit within the authorizing provider's specialty     Patient has had an office visit with the authorizing provider or a provider within the authorizing providers department within the previous 12 mos or has a future within next 30 days. See \"Patient Info\" tab in inbasket, or \"Choose Columns\" in Meds & Orders section of the refill encounter.              Passed - Medication is active on med list        Passed - Patient is age 18 or older        Passed - No active pregnacy on record        Passed - No positive pregnancy test in past 12 months           Last Written Prescription Date:  5/23/2019  Last Fill Quantity: 90,  # refills: 3   Last office visit: 10/4/2019 with prescribing provider:  Gonzalo    Future Office Visit:            "

## 2020-06-02 NOTE — TELEPHONE ENCOUNTER
Prescription approved per [facility] Refill Protocol.    Gisele ANGULO RN, BSN

## 2020-07-28 ENCOUNTER — TELEPHONE (OUTPATIENT)
Dept: FAMILY MEDICINE | Facility: CLINIC | Age: 60
End: 2020-07-28

## 2020-07-28 NOTE — TELEPHONE ENCOUNTER
Reason for Call:  Other prescription    Detailed comments: Patient is asking for prednison for her back pain said she got it in the past.   Her back is so bad she said she I crawling to the bathroom she cant walk.    Phone Number Patient can be reached at: Home number on file [phone] (home)    Best Time: any    Can we leave a detailed message on this number? YES    Call taken on 7/28/2020 at 4:26 PM by Jackie Garrison

## 2020-07-28 NOTE — TELEPHONE ENCOUNTER
Pt requesting po steroids for back pain that is radiating down into her thigh.  States her back went out and is only able to crawl.  Her back went out 7 yrs ago and steroids worked.  Informed she needs an appt and could have wheelchair when she arrived in clinic.  Pt refused appt.  Has tried ice,motrin and muscle relaxer.  [facility] is [facility].  Informed pt would ask provider and call back in AM.  Advise.  Gretta

## 2020-07-29 ENCOUNTER — OFFICE VISIT (OUTPATIENT)
Dept: FAMILY MEDICINE | Facility: CLINIC | Age: 60
End: 2020-07-29
Payer: COMMERCIAL

## 2020-07-29 ENCOUNTER — ANCILLARY PROCEDURE (OUTPATIENT)
Dept: GENERAL RADIOLOGY | Facility: CLINIC | Age: 60
End: 2020-07-29
Attending: FAMILY MEDICINE
Payer: COMMERCIAL

## 2020-07-29 VITALS
BODY MASS INDEX: 22.13 KG/M2 | HEIGHT: 68 IN | DIASTOLIC BLOOD PRESSURE: 80 MMHG | TEMPERATURE: 97.9 F | RESPIRATION RATE: 16 BRPM | SYSTOLIC BLOOD PRESSURE: 124 MMHG | OXYGEN SATURATION: 98 % | HEART RATE: 96 BPM | WEIGHT: 146 LBS

## 2020-07-29 DIAGNOSIS — M25.551 HIP PAIN, RIGHT: ICD-10-CM

## 2020-07-29 DIAGNOSIS — M54.41 ACUTE RIGHT-SIDED LOW BACK PAIN WITH RIGHT-SIDED SCIATICA: ICD-10-CM

## 2020-07-29 DIAGNOSIS — M54.41 ACUTE RIGHT-SIDED LOW BACK PAIN WITH RIGHT-SIDED SCIATICA: Primary | ICD-10-CM

## 2020-07-29 DIAGNOSIS — R29.898 RIGHT LEG WEAKNESS: ICD-10-CM

## 2020-07-29 PROCEDURE — 99214 OFFICE O/P EST MOD 30 MIN: CPT | Performed by: FAMILY MEDICINE

## 2020-07-29 PROCEDURE — 72170 X-RAY EXAM OF PELVIS: CPT

## 2020-07-29 PROCEDURE — 72100 X-RAY EXAM L-S SPINE 2/3 VWS: CPT

## 2020-07-29 RX ORDER — PREDNISONE 20 MG/1
TABLET ORAL
Qty: 20 TABLET | Refills: 0 | Status: SHIPPED | OUTPATIENT
Start: 2020-07-29 | End: 2021-09-07

## 2020-07-29 ASSESSMENT — MIFFLIN-ST. JEOR: SCORE: 1275.75

## 2020-07-29 ASSESSMENT — PAIN SCALES - GENERAL: PAINLEVEL: MILD PAIN (2)

## 2020-07-29 NOTE — PROGRESS NOTES
"Subjective     Mihaela Vance is a 60 year old female who presents to clinic today for the following health issues:    HPI     Chief Complaint   Patient presents with     Back Pain     Fell against an end table and fell backwards.           Joint Pain    Onset: 7/22/2020    Description:   Location: back pain and right leg pain  Character: -    Intensity: mild, 2/10 rang from  2 - 10. Worse with movement    Progression of Symptoms: same, \"slightly better\"    Accompanying Signs & Symptoms:  Other symptoms: radiation of pain to down right legs    History:   Previous similar pain: YES      Precipitating factors:   Trauma or overuse: YES- Fell flat on back    Alleviating factors:  Improved by: ice and Ibuprofen help some    Therapies Tried and outcome:           Patient Active Problem List   Diagnosis     Family history of diabetes mellitus     CARDIOVASCULAR SCREENING; LDL GOAL LESS THAN 160     Anxiety     Panic disorder     Hyponatremia     Hypertension, goal below 140/90     History of anemia     Fatty liver/ ?cirrhosis     Hyperlipidemia LDL goal <100     GI bleed     Chronic pain syndrome     Thiamine deficiency neuropathy     Iron deficiency anemia due to chronic blood loss     CKD (chronic kidney disease) stage 3, GFR 30-59 ml/min (H)     Past Surgical History:   Procedure Laterality Date     BONE MARROW BIOPSY, BONE SPECIMEN, NEEDLE/TROCAR N/A 6/2/2015    Procedure: BIOPSY BONE MARROW;  Surgeon: Cady Rodriguez MD;  Location: [facility]     COLONOSCOPY  12/8/2010    COLONOSCOPY performed by MADAY BADILLO at [facility]     ESOPHAGOSCOPY, GASTROSCOPY, DUODENOSCOPY (EGD), COMBINED N/A 4/24/2019    Procedure: ESOPHAGOGASTRODUODENOSCOPY, WITH BIOPSY;  Surgeon: Nic Reardon DO;  Location: [facility]     ESOPHAGOSCOPY, GASTROSCOPY, DUODENOSCOPY (EGD), COMBINED N/A 6/19/2019    Procedure: ESOPHAGOGASTRODUODENOSCOPY, WITH BIOPSY;  Surgeon: Nic Reardon DO;  Location: [facility]     ORTHOPEDIC SURGERY  " 4/28/2005    Left knee medial and lateral meniscal tears.       Social History     Tobacco Use     Smoking status: Never Smoker     Smokeless tobacco: Never Used   Substance Use Topics     Alcohol use: Yes     Comment: 2 days per week 3-4 drinks      Family History   Problem Relation Age of Onset     Cardiovascular Brother 40        3 heart attacks, last MI 55 years old     Hypertension Brother      Diabetes Brother      C.A.D. Father          age 77 MI     Heart Disease Father         heart attack     Prostate Cancer Father      Cardiovascular Mother         CHF     Diabetes Mother      Hypertension Mother      Obesity Mother      Psychotic Disorder Mother         hospitalized after birth of third child for 6 weeks, was hitting her head on the wall and also hitting her head with a croquet mallet, placed on Thorazine.     Psychotic Disorder Maternal Grandmother         attempted suicide         Current Outpatient Medications   Medication Sig Dispense Refill     alum & mag hydroxide-simethicone (MYLANTA/MAALOX) 200-200-20 MG/5ML SUSP suspension Take 30 mLs by mouth 2 times daily       amLODIPine (NORVASC) 2.5 MG tablet Take 1 tablet (2.5 mg) by mouth daily 90 tablet 1     atenolol (TENORMIN) 50 MG tablet Take 1 tablet (50 mg) by mouth daily 90 tablet 3     B Complex Vitamins (B COMPLEX PO) Take 1 tablet by mouth daily.       busPIRone HCl (BUSPAR) 30 MG tablet Take 1 tablet (30 mg) by mouth 2 times daily 180 tablet 3     calcium carbonate (TUMS) 500 MG chewable tablet Take 2 chew tab by mouth 3 times daily       Calcium Carbonate-Vitamin D (CALCIUM + D PO) Take 2 tablets by mouth daily.       cyclobenzaprine (FLEXERIL) 10 MG tablet Take 1 tablet (10 mg) by mouth nightly as needed for muscle spasms 30 tablet 1     diclofenac (VOLTAREN) 1 % topical gel Place 2 g onto the skin 4 times daily as needed for moderate pain 60 g 3     ferrous sulfate (FEROSUL) 325 (65 Fe) MG tablet Take 1 tablet (325 mg) by mouth daily (with  breakfast) Take with orange juice to enhance absorption. 100 tablet 3     FOLIC ACID PO Take 400 mcg by mouth daily       HYDROcodone-acetaminophen (NORCO) 5-325 MG tablet Take 1 tablet by mouth every 6 hours as needed for breakthrough pain or severe pain 20 tablet 0     hydrOXYzine (ATARAX) 25 MG tablet Take 1 tablet (25 mg) by mouth nightly as needed for other (sleep) 90 tablet 3     magnesium oxide (MAG-OX) 400 MG tablet Take 1 tablet (400 mg) by mouth daily 60 tablet 3     Omega-3 Fatty Acids (FISH OIL PO) Take 1 capsule by mouth daily        omeprazole (PRILOSEC) 40 MG DR capsule TAKE 1 CAPSULE EVERY DAY 90 capsule 0     predniSONE (DELTASONE) 20 MG tablet Take 3 tabs by mouth daily x 3 days, then 2 tabs daily x 3 days, then 1 tab daily x 3 days, then 1/2 tab daily x 3 days. 20 tablet 0     vitamin B1 (THIAMINE) 100 MG tablet Take 1 tab 3 times daily by mouth for 1 week and then decrease to 1 tab daily. 90 tablet 3     VITAMIN D, CHOLECALCIFEROL, PO Take 400 Units by mouth daily        Allergies   Allergen Reactions     Lisinopril Other (See Comments)     Terrible taste to food     Ativan Nausea and Vomiting     Sertraline Other (See Comments)     Irritation and tightness in throat     Tizanidine Other (See Comments)     Fainting      Hctz [Hydrochlorothiazide] Hives     Seroquel [Quetiapine Fumarate] Other (See Comments) and Nausea     Very tired, couldn't do anything       Reviewed and updated as needed this visit by Provider         Review of Systems   Constitutional, neuro, ENT, endocrine, pulmonary, cardiac, gastrointestinal, genitourinary, musculoskeletal, integument and psychiatric systems are negative, except as otherwise noted.       Objective    LMP 09/16/2006 (Exact Date)   There is no height or weight on file to calculate BMI.  Physical Exam   GENERAL: healthy, alert and no distress  MUSCULOSKELETAL:  No midline vertebral tenderness to palpation. Has bilateral paravertebral tenderness and tightness.  Strength is 4/5 RLE - she has give way weakness - and 5/5 LLE and DTR 1+ and symmetric throughout lower extremities. No pain to palpation of hip but did have hip pain with ROM.    Diagnostic Test Results:  Labs reviewed in Epic    Recent Results (from the past 744 hour(s))   XR Lumbar Spine 2/3 Views    Narrative    LUMBAR SPINE TWO - THREE  7/29/2020 3:58 PM     COMPARISON: None    HISTORY: Acute right-sided low back pain with right-sided sciatica.      Impression    IMPRESSION: There is grade 1 degenerative anterolisthesis of L4 upon  L5. There is moderate left convex curvature of the lumbar spine  centered at the L3-L4 level. Alignment of the lumbar vertebrae is  otherwise within normal limits. Vertebral body heights of the lumbar  spine appear normal. There is chronic appearing mild anterior wedge  compression deformity of the T12 vertebral body. There is no definite  evidence for recent fracture of the lumbar spine. There is moderate  facet arthropathy throughout the lumbar spine. There is loss of  intervertebral disc space height and degenerative endplate spurring at  the L2-L3, L4-L5 and L5-S1 levels.    ZACHARY FISHER MD   XR Pelvis 1/2 Views    Narrative    PELVIS ONE - TWO VIEW 7/29/2020 3:58 PM     HISTORY: Hip pain, right.    COMPARISON: None.      Impression    IMPRESSION: The inferior ischial pubic rami and far lateral right  greater trochanter are not included in the field-of-view. Visualized  bony structures show no acute abnormality. No significant degenerative  changes at the hips. There is degenerative change in the lower lumbar  spine and sacroiliac joints bilaterally.    CANDIDO ROCHE MD             Assessment & Plan     1. Acute right-sided low back pain with right-sided sciatica  - XR Lumbar Spine 2/3 Views; Future    2. Hip pain, right  - XR Pelvis 1/2 Views; Future    3. Right leg weakness  Will do a course of prednisone but if not having rapid improvement in weakness then proceed with  ASAP MRI.  - predniSONE (DELTASONE) 20 MG tablet; Take 3 tabs by mouth daily x 3 days, then 2 tabs daily x 3 days, then 1 tab daily x 3 days, then 1/2 tab daily x 3 days.  Dispense: 20 tablet; Refill: 0         No follow-ups on file.    Elieser Wu MD  [facility]

## 2020-07-29 NOTE — PATIENT INSTRUCTIONS
Our Clinic hours are:  Mondays    7:20 am - 7 pm  Tues -  Fri  7:20 am - 5 pm    Clinic Phone: [phone]    The clinic lab opens at 7:30 am Mon - Fri and appointments are required.    [facility]. [phone]  Monday  8 am - 7pm  Tues - Fri 8 am - 5:30 pm

## 2020-07-29 NOTE — TELEPHONE ENCOUNTER
This requires at minimum a virtual visit with a physician/provider so further questions can be asked regarding red flags that would require more immediate attention.    Radha Rain M.D.

## 2020-08-10 NOTE — TELEPHONE ENCOUNTER
"LM for pt to call clinic/RN.  PA denied for 3x/day dosing.  Does pt take 1x/day or PRN?  Clarifiy with pt - order reads with \"a meal\" is this 1 or 3?  Send new order with clarification to [facility]............  KPavelRN    " BLOOD PRESSURE CUFF    Get a blood pressure cuff that goes on the arm. Do not get the wrist cuff. Check your blood pressures on your left arm 2-3 times per week at home.   Do this with your left arm supported by a tab

## 2020-08-15 DIAGNOSIS — I10 ESSENTIAL HYPERTENSION WITH GOAL BLOOD PRESSURE LESS THAN 140/90: ICD-10-CM

## 2020-08-17 NOTE — TELEPHONE ENCOUNTER
"Requested Prescriptions   Pending Prescriptions Disp Refills     amLODIPine (NORVASC) 2.5 MG tablet [Pharmacy Med Name: amlodipine 2.5 mg tablet] 90 tablet 1     Sig: TAKE 1 TABLET BY MOUTH EVERY DAY       Calcium Channel Blockers Protocol  Failed - 8/15/2020  8:01 AM        Failed - Normal serum creatinine on file in past 12 months     Recent Labs   Lab Test 03/26/20  0910   CR 1.21*       Ok to refill medication if creatinine is low          Passed - Blood pressure under 140/90 in past 12 months     BP Readings from Last 3 Encounters:   07/29/20 124/80   01/10/20 135/84   10/04/19 102/74                 Passed - Recent (12 mo) or future (30 days) visit within the authorizing provider's specialty     Patient has had an office visit with the authorizing provider or a provider within the authorizing providers department within the previous 12 mos or has a future within next 30 days. See \"Patient Info\" tab in inbasket, or \"Choose Columns\" in Meds & Orders section of the refill encounter.              Passed - Medication is active on med list        Passed - Patient is age 18 or older        Passed - No active pregnancy on record        Passed - No positive pregnancy test in past 12 months             "

## 2020-08-19 NOTE — TELEPHONE ENCOUNTER
Routing refill request to provider for review/approval because:  Labs out of range:  Yosi MANNING RN BSN

## 2020-08-20 RX ORDER — AMLODIPINE BESYLATE 2.5 MG/1
TABLET ORAL
Qty: 90 TABLET | Refills: 1 | Status: SHIPPED | OUTPATIENT
Start: 2020-08-20 | End: 2021-03-09

## 2020-10-03 DIAGNOSIS — M51.369 DDD (DEGENERATIVE DISC DISEASE), LUMBAR: ICD-10-CM

## 2020-10-05 RX ORDER — CYCLOBENZAPRINE HCL 10 MG
10 TABLET ORAL
Qty: 30 TABLET | Refills: 1 | Status: SHIPPED | OUTPATIENT
Start: 2020-10-05 | End: 2021-01-07

## 2020-10-05 NOTE — TELEPHONE ENCOUNTER
Requested Prescriptions   Pending Prescriptions Disp Refills     cyclobenzaprine (FLEXERIL) 10 MG tablet [Pharmacy Med Name: cyclobenzaprine 10 mg tablet] 30 tablet 1     Sig: Take 1 tablet (10 mg) by mouth nightly as needed for muscle spasms       There is no refill protocol information for this order

## 2020-11-17 DIAGNOSIS — K92.2 GASTROINTESTINAL HEMORRHAGE, UNSPECIFIED GASTROINTESTINAL HEMORRHAGE TYPE: ICD-10-CM

## 2020-11-18 NOTE — TELEPHONE ENCOUNTER
----- Message from Hazel Childs LPN sent at 11/18/2020  5:48 PM CST -----  Regarding: FW: Pt called requesting a sooner appt due to being referred by Dr Tobias and would like a call back today asp    ----- Message -----  From: Kaylyn Rene  Sent: 11/18/2020   2:24 PM CST  To: Ruben ALMAGUER Staff  Subject: Pt called requesting a sooner appt due to be#    Appointment Access Request:    Pt called requesting a sooner appt due to being referred by Dr Tobias and would like a call back today asap and can be reached at [phone]       Routing refill request to provider for review/approval because:  Last ordered 7/21/2017 by Dr. Ivan Wu. Last OV 8/28/18 with ERNESTO Cameron CNP  Sodium out of range    Gisele ANGULO RN

## 2020-11-18 NOTE — TELEPHONE ENCOUNTER
"Requested Prescriptions   Pending Prescriptions Disp Refills     omeprazole (PRILOSEC) 40 MG DR capsule [Pharmacy Med Name: OMEPRAZOLE 40 MG Capsule Delayed Release] 90 capsule 0     Sig: TAKE 1 CAPSULE EVERY DAY       PPI Protocol Passed - 11/17/2020  6:02 PM        Passed - Not on Clopidogrel (unless Pantoprazole ordered)        Passed - No diagnosis of osteoporosis on record        Passed - Recent (12 mo) or future (30 days) visit within the authorizing provider's specialty     Patient has had an office visit with the authorizing provider or a provider within the authorizing providers department within the previous 12 mos or has a future within next 30 days. See \"Patient Info\" tab in inbasket, or \"Choose Columns\" in Meds & Orders section of the refill encounter.              Passed - Medication is active on med list        Passed - Patient is age 18 or older        Passed - No active pregnacy on record        Passed - No positive pregnancy test in past 12 months             "

## 2020-11-19 RX ORDER — OMEPRAZOLE 40 MG/1
CAPSULE, DELAYED RELEASE ORAL
Qty: 90 CAPSULE | Refills: 0 | Status: SHIPPED | OUTPATIENT
Start: 2020-11-19 | End: 2021-04-01

## 2020-12-15 DIAGNOSIS — F41.9 ANXIETY: ICD-10-CM

## 2020-12-15 DIAGNOSIS — M51.369 DEGENERATION OF LUMBAR INTERVERTEBRAL DISC: Primary | ICD-10-CM

## 2020-12-15 NOTE — TELEPHONE ENCOUNTER
"Requested Prescriptions   Pending Prescriptions Disp Refills     diclofenac (VOLTAREN) 1 % topical gel [Pharmacy Med Name: DICLOFENAC SODIUM 1 % Gel] 100 g      Sig: APPLY 2 GRAMS ONTO THE SKIN FOUR TIMES DAILY AS NEEDED FOR MODERATE PAIN       Topical Steroids and Nonsteroidals Protocol Passed - 12/15/2020  1:58 PM        Passed - Patient is age 6 or older        Passed - Authorizing prescriber's most recent note related to this medication read.     If refill request is for ophthalmic use, please forward request to provider for approval.          Passed - High potency steroid not ordered        Passed - Recent (12 mo) or future (30 days) visit within the authorizing provider's specialty     Patient has had an office visit with the authorizing provider or a provider within the authorizing providers department within the previous 12 mos or has a future within next 30 days. See \"Patient Info\" tab in inbasket, or \"Choose Columns\" in Meds & Orders section of the refill encounter.              Passed - Medication is active on med list             "

## 2020-12-17 RX ORDER — HYDROXYZINE HYDROCHLORIDE 25 MG/1
TABLET, FILM COATED ORAL
Qty: 90 TABLET | Refills: 0 | Status: SHIPPED | OUTPATIENT
Start: 2020-12-17 | End: 2021-05-13

## 2021-01-05 DIAGNOSIS — M51.369 DDD (DEGENERATIVE DISC DISEASE), LUMBAR: ICD-10-CM

## 2021-01-07 RX ORDER — CYCLOBENZAPRINE HCL 10 MG
10 TABLET ORAL
Qty: 30 TABLET | Refills: 1 | Status: ON HOLD | OUTPATIENT
Start: 2021-01-07 | End: 2022-01-27

## 2021-01-07 NOTE — TELEPHONE ENCOUNTER
Kylah,    Routing refill request to provider for review/approval because:  Drug not on the [facility] refill protocol Yue WASHINGTON RN

## 2021-01-12 ENCOUNTER — TELEPHONE (OUTPATIENT)
Dept: FAMILY MEDICINE | Facility: CLINIC | Age: 61
End: 2021-01-12

## 2021-01-12 NOTE — LETTER
[facility]  [address]  [facility] [id number] [phone]  January 12, 2021    Mihaela Vance  [address] [address] [facility] [address] [facility] [address]    Dear Mihaela,    We care about your health and have reviewed your health plan including your medical conditions, medication list, and lab results.  Based on this review, it is recommended that you follow up regarding the following health topic(s):     -Breast Cancer Screening    This is a reminder that it is time to make your appointment for your annual mammogram. You may make an appointment for your Digital Mammogram by calling our scheduling line at [phone] to schedule at one of our locations. Your last mammogram was 9/5/2018.    If you are experiencing breast symptoms or concerns such as a new lump or breast pain you should first contact your health care provider before scheduling your mammogram. Your physician may want to see you prior to your visit.    We would like to take this opportunity to remind you that along with an annual mammogram, the American Cancer Society recommends an annual breast examination by your physician or health care provider.    Please disregard this notice if you have already scheduled an appointment. Or, if you have had this done elsewhere, please have your records sent to the clinic.     Thank you and we look forward to seeing you in the near future for your annual screening mammogram.      Healthy Regards,      Your Health Care Team  [facility]

## 2021-01-12 NOTE — TELEPHONE ENCOUNTER
Panel Management Review      Patient has the following on her problem list:     Hypertension   Last three blood pressure readings:  BP Readings from Last 3 Encounters:   07/29/20 124/80   01/10/20 135/84   10/04/19 102/74     Blood pressure: Passed    HTN Guidelines:  Less than 140/90      Composite cancer screening  Chart review shows that this patient is due/due soon for the following Pap Smear and Mammogram  Summary:    Patient is due/failing the following:   MAMMOGRAM, PAP and PHYSICAL    Action needed:   Patient needs office visit for annual pe.    Type of outreach:    Sent letter.    Questions for provider review:    None                                                                                                                                    Maribeth Rain MA

## 2021-01-12 NOTE — LETTER
January 12, 2021      Mihaela Vance  [address] [address] [facility] [address]        Dear Mihaela,    We care about your health and have reviewed your health plan including your medical conditions, medication list, and lab results.  Based on this review, it is recommended that you follow up regarding the following health topic(s):     -Wellness (Physical) Visit     We recommend you take the following action(s):    - Please call us at [phone] (or use [employer] [place]) to make an appointment with your primary    care provider.     Thank you for trusting [facility] and we appreciate the opportunity to serve you.  We look forward to supporting your healthcare needs in the future.    Healthy Regards,      Your Health Care Team  [facility]        Sincerely,        RON Lora CNP

## 2021-01-13 DIAGNOSIS — F41.9 ANXIETY: ICD-10-CM

## 2021-01-13 NOTE — TELEPHONE ENCOUNTER
"Requested Prescriptions   Pending Prescriptions Disp Refills     busPIRone HCl (BUSPAR) 30 MG tablet [Pharmacy Med Name: BUSPIRONE HYDROCHLORIDE 30 MG Tablet] 180 tablet 3     Sig: TAKE 1 TABLET TWICE DAILY       Atypical Antidepressants Protocol Failed - 1/13/2021  2:32 PM        Failed - Recent (12 mo) or future (30 days) visit within the authorizing provider's specialty     Patient has had an office visit with the authorizing provider or a provider within the authorizing providers department within the previous 12 mos or has a future within next 30 days. See \"Patient Info\" tab in inbasket, or \"Choose Columns\" in Meds & Orders section of the refill encounter.              Passed - Medication active on med list        Passed - Patient is age 18 or older        Passed - No active pregnancy on record        Passed - No positive pregnancy test in past 12 mos             "

## 2021-01-15 NOTE — TELEPHONE ENCOUNTER
Routing refill request to provider for review/approval because:  Labs not current:  PHQ-9      No flowsheet data found.      Routing to provider.  Mable MANNING, MSN, RN

## 2021-01-18 RX ORDER — BUSPIRONE HYDROCHLORIDE 30 MG/1
TABLET ORAL
Qty: 180 TABLET | Refills: 1 | Status: SHIPPED | OUTPATIENT
Start: 2021-01-18 | End: 2021-08-13

## 2021-03-06 DIAGNOSIS — I10 ESSENTIAL HYPERTENSION WITH GOAL BLOOD PRESSURE LESS THAN 140/90: ICD-10-CM

## 2021-03-08 NOTE — TELEPHONE ENCOUNTER
"Requested Prescriptions   Pending Prescriptions Disp Refills     amLODIPine (NORVASC) 2.5 MG tablet [Pharmacy Med Name: amlodipine 2.5 mg tablet] 90 tablet 1     Sig: TAKE 1 TABLET BY MOUTH EVERY DAY       Calcium Channel Blockers Protocol  Failed - 3/6/2021  8:00 AM        Failed - Normal serum creatinine on file in past 12 months     Recent Labs   Lab Test 03/26/20  0910   CR 1.21*       Ok to refill medication if creatinine is low          Passed - Blood pressure under 140/90 in past 12 months     BP Readings from Last 3 Encounters:   07/29/20 124/80   01/10/20 135/84   10/04/19 102/74                 Passed - Recent (12 mo) or future (30 days) visit within the authorizing provider's specialty     Patient has had an office visit with the authorizing provider or a provider within the authorizing providers department within the previous 12 mos or has a future within next 30 days. See \"Patient Info\" tab in inbasket, or \"Choose Columns\" in Meds & Orders section of the refill encounter.              Passed - Medication is active on med list        Passed - Patient is age 18 or older        Passed - No active pregnancy on record        Passed - No positive pregnancy test in past 12 months             "

## 2021-03-09 RX ORDER — AMLODIPINE BESYLATE 2.5 MG/1
TABLET ORAL
Qty: 90 TABLET | Refills: 1 | Status: SHIPPED | OUTPATIENT
Start: 2021-03-09 | End: 2021-09-07

## 2021-03-29 DIAGNOSIS — I10 ESSENTIAL HYPERTENSION WITH GOAL BLOOD PRESSURE LESS THAN 140/90: ICD-10-CM

## 2021-03-29 NOTE — TELEPHONE ENCOUNTER
"Requested Prescriptions   Pending Prescriptions Disp Refills     atenolol (TENORMIN) 50 MG tablet [Pharmacy Med Name: atenolol 50 mg tablet] 90 tablet 0     Sig: Take 1 tablet (50 mg) by mouth daily       Beta-Blockers Protocol Passed - 3/29/2021  8:19 AM        Passed - Blood pressure under 140/90 in past 12 months     BP Readings from Last 3 Encounters:   07/29/20 124/80   01/10/20 135/84   10/04/19 102/74                 Passed - Patient is age 6 or older        Passed - Recent (12 mo) or future (30 days) visit within the authorizing provider's specialty     Patient has had an office visit with the authorizing provider or a provider within the authorizing providers department within the previous 12 mos or has a future within next 30 days. See \"Patient Info\" tab in inbasket, or \"Choose Columns\" in Meds & Orders section of the refill encounter.              Passed - Medication is active on med list             "

## 2021-03-30 RX ORDER — ATENOLOL 50 MG/1
50 TABLET ORAL DAILY
Qty: 90 TABLET | Refills: 0 | Status: SHIPPED | OUTPATIENT
Start: 2021-03-30 | End: 2021-09-07

## 2021-03-31 DIAGNOSIS — M51.369 DEGENERATION OF LUMBAR INTERVERTEBRAL DISC: ICD-10-CM

## 2021-03-31 DIAGNOSIS — K92.2 GASTROINTESTINAL HEMORRHAGE, UNSPECIFIED GASTROINTESTINAL HEMORRHAGE TYPE: ICD-10-CM

## 2021-03-31 NOTE — TELEPHONE ENCOUNTER
"Requested Prescriptions   Pending Prescriptions Disp Refills     diclofenac (VOLTAREN) 1 % topical gel [Pharmacy Med Name: DICLOFENAC SODIUM 1 % Gel] 100 g 0     Sig: APPLY 2 GRAMS ONTO THE SKIN FOUR TIMES DAILY AS NEEDED FOR MODERATE PAIN       Topical Steroids and Nonsteroidals Protocol Passed - 3/31/2021  2:08 PM        Passed - Patient is age 6 or older        Passed - Authorizing prescriber's most recent note related to this medication read.     If refill request is for ophthalmic use, please forward request to provider for approval.          Passed - High potency steroid not ordered        Passed - Recent (12 mo) or future (30 days) visit within the authorizing provider's specialty     Patient has had an office visit with the authorizing provider or a provider within the authorizing providers department within the previous 12 mos or has a future within next 30 days. See \"Patient Info\" tab in inbasket, or \"Choose Columns\" in Meds & Orders section of the refill encounter.              Passed - Medication is active on med list           omeprazole (PRILOSEC) 40 MG DR capsule [Pharmacy Med Name: OMEPRAZOLE 40 MG Capsule Delayed Release] 90 capsule 0     Sig: TAKE 1 CAPSULE EVERY DAY       PPI Protocol Passed - 3/31/2021  2:08 PM        Passed - Not on Clopidogrel (unless Pantoprazole ordered)        Passed - No diagnosis of osteoporosis on record        Passed - Recent (12 mo) or future (30 days) visit within the authorizing provider's specialty     Patient has had an office visit with the authorizing provider or a provider within the authorizing providers department within the previous 12 mos or has a future within next 30 days. See \"Patient Info\" tab in inbasket, or \"Choose Columns\" in Meds & Orders section of the refill encounter.              Passed - Medication is active on med list        Passed - Patient is age 18 or older        Passed - No active pregnacy on record        Passed - No positive pregnancy " test in past 12 months

## 2021-04-01 RX ORDER — OMEPRAZOLE 40 MG/1
40 CAPSULE, DELAYED RELEASE ORAL DAILY
Qty: 90 CAPSULE | Refills: 0 | Status: SHIPPED | OUTPATIENT
Start: 2021-04-01 | End: 2021-09-07

## 2021-05-13 DIAGNOSIS — F41.9 ANXIETY: ICD-10-CM

## 2021-05-13 RX ORDER — HYDROXYZINE HYDROCHLORIDE 25 MG/1
TABLET, FILM COATED ORAL
Qty: 90 TABLET | Refills: 0 | Status: SHIPPED | OUTPATIENT
Start: 2021-05-13 | End: 2021-09-07

## 2021-08-13 ENCOUNTER — TELEPHONE (OUTPATIENT)
Dept: FAMILY MEDICINE | Facility: CLINIC | Age: 61
End: 2021-08-13

## 2021-08-13 DIAGNOSIS — F41.9 ANXIETY: ICD-10-CM

## 2021-08-13 NOTE — TELEPHONE ENCOUNTER
Routing refill request to provider for review/approval because:  Patient needs to be seen because it has been more than 1 year since last office visit.    Nicole Seymour RN

## 2021-08-13 NOTE — TELEPHONE ENCOUNTER
Reason for Call:  Medication or medication refill:    Do you use a [facility]?  Name of the pharmacy and phone number for the current request:  [facility] - [place] [phone]    Name of the medication requested: Pending Prescriptions:                       Disp   Refills    busPIRone HCl (BUSPAR) 30 MG tablet       180 ta*1            Sig: Take 1 tablet (30 mg) by mouth 2 times daily        Can we leave a detailed message on this number? YES    Phone number patient can be reached at: Home number on file [phone] (home)    Best Time: any    Call taken on 8/13/2021 at 2:44 PM by Glory Rosen

## 2021-08-16 RX ORDER — BUSPIRONE HYDROCHLORIDE 30 MG/1
30 TABLET ORAL 2 TIMES DAILY
Qty: 60 TABLET | Refills: 0 | Status: SHIPPED | OUTPATIENT
Start: 2021-08-16 | End: 2021-09-07

## 2021-08-16 NOTE — TELEPHONE ENCOUNTER
Filled Rx for 1 month. Patient is due for annual office visit, please notify. Thank you. RON Lopez CNP       Patient noticed and appointment made.  Nicole Seymour RN

## 2021-08-16 NOTE — CONFIDENTIAL NOTE
Filled Rx for 1 month. Patient is due for annual office visit, please notify. Thank you. RON Lopez CNP

## 2021-09-07 ENCOUNTER — OFFICE VISIT (OUTPATIENT)
Dept: FAMILY MEDICINE | Facility: CLINIC | Age: 61
End: 2021-09-07
Payer: COMMERCIAL

## 2021-09-07 VITALS
BODY MASS INDEX: 19.1 KG/M2 | OXYGEN SATURATION: 99 % | HEIGHT: 68 IN | RESPIRATION RATE: 16 BRPM | DIASTOLIC BLOOD PRESSURE: 88 MMHG | SYSTOLIC BLOOD PRESSURE: 100 MMHG | WEIGHT: 126 LBS | HEART RATE: 87 BPM | TEMPERATURE: 96.9 F

## 2021-09-07 DIAGNOSIS — Z23 NEED FOR INFLUENZA VACCINATION: ICD-10-CM

## 2021-09-07 DIAGNOSIS — F41.9 ANXIETY: ICD-10-CM

## 2021-09-07 DIAGNOSIS — I10 ESSENTIAL HYPERTENSION WITH GOAL BLOOD PRESSURE LESS THAN 140/90: ICD-10-CM

## 2021-09-07 DIAGNOSIS — Z78.0 ASYMPTOMATIC MENOPAUSAL STATE: ICD-10-CM

## 2021-09-07 DIAGNOSIS — E87.1 HYPONATREMIA: ICD-10-CM

## 2021-09-07 DIAGNOSIS — Z87.81 PERSONAL HISTORY OF TRAUMATIC FRACTURE: ICD-10-CM

## 2021-09-07 DIAGNOSIS — K92.2 GASTROINTESTINAL HEMORRHAGE, UNSPECIFIED GASTROINTESTINAL HEMORRHAGE TYPE: ICD-10-CM

## 2021-09-07 DIAGNOSIS — Z00.00 ENCOUNTER FOR MEDICARE ANNUAL WELLNESS EXAM: Primary | ICD-10-CM

## 2021-09-07 DIAGNOSIS — D50.0 IRON DEFICIENCY ANEMIA DUE TO CHRONIC BLOOD LOSS: ICD-10-CM

## 2021-09-07 DIAGNOSIS — Z12.31 SCREENING MAMMOGRAM, ENCOUNTER FOR: ICD-10-CM

## 2021-09-07 DIAGNOSIS — Z23 NEED FOR PROPHYLACTIC VACCINATION AND INOCULATION AGAINST INFLUENZA: ICD-10-CM

## 2021-09-07 DIAGNOSIS — N18.31 STAGE 3A CHRONIC KIDNEY DISEASE (H): ICD-10-CM

## 2021-09-07 LAB
ALBUMIN SERPL-MCNC: 3.3 G/DL (ref 3.4–5)
ALP SERPL-CCNC: 167 U/L (ref 40–150)
ALT SERPL W P-5'-P-CCNC: 23 U/L (ref 0–50)
ANION GAP SERPL CALCULATED.3IONS-SCNC: 7 MMOL/L (ref 3–14)
AST SERPL W P-5'-P-CCNC: 40 U/L (ref 0–45)
BILIRUB SERPL-MCNC: 0.5 MG/DL (ref 0.2–1.3)
BUN SERPL-MCNC: 14 MG/DL (ref 7–30)
CALCIUM SERPL-MCNC: 8.9 MG/DL (ref 8.5–10.1)
CHLORIDE BLD-SCNC: 99 MMOL/L (ref 94–109)
CO2 SERPL-SCNC: 27 MMOL/L (ref 20–32)
CREAT SERPL-MCNC: 1.02 MG/DL (ref 0.52–1.04)
ERYTHROCYTE [DISTWIDTH] IN BLOOD BY AUTOMATED COUNT: 14.3 % (ref 10–15)
FERRITIN SERPL-MCNC: 252 NG/ML (ref 8–252)
GFR SERPL CREATININE-BSD FRML MDRD: 59 ML/MIN/1.73M2
GLUCOSE BLD-MCNC: 134 MG/DL (ref 70–99)
HCT VFR BLD AUTO: 32.5 % (ref 35–47)
HGB BLD-MCNC: 10.9 G/DL (ref 11.7–15.7)
IRON SATN MFR SERPL: 54 % (ref 15–46)
IRON SERPL-MCNC: 132 UG/DL (ref 35–180)
MCH RBC QN AUTO: 33.2 PG (ref 26.5–33)
MCHC RBC AUTO-ENTMCNC: 33.5 G/DL (ref 31.5–36.5)
MCV RBC AUTO: 99 FL (ref 78–100)
PLATELET # BLD AUTO: 279 10E3/UL (ref 150–450)
POTASSIUM BLD-SCNC: 3.5 MMOL/L (ref 3.4–5.3)
PROT SERPL-MCNC: 7.8 G/DL (ref 6.8–8.8)
RBC # BLD AUTO: 3.28 10E6/UL (ref 3.8–5.2)
SODIUM SERPL-SCNC: 133 MMOL/L (ref 133–144)
TIBC SERPL-MCNC: 246 UG/DL (ref 240–430)
TSH SERPL DL<=0.005 MIU/L-ACNC: 0.65 MU/L (ref 0.4–4)
WBC # BLD AUTO: 3.7 10E3/UL (ref 4–11)

## 2021-09-07 PROCEDURE — 99396 PREV VISIT EST AGE 40-64: CPT | Mod: 25 | Performed by: NURSE PRACTITIONER

## 2021-09-07 PROCEDURE — 80053 COMPREHEN METABOLIC PANEL: CPT | Performed by: NURSE PRACTITIONER

## 2021-09-07 PROCEDURE — 82607 VITAMIN B-12: CPT | Performed by: NURSE PRACTITIONER

## 2021-09-07 PROCEDURE — 82728 ASSAY OF FERRITIN: CPT | Performed by: NURSE PRACTITIONER

## 2021-09-07 PROCEDURE — 90682 RIV4 VACC RECOMBINANT DNA IM: CPT | Performed by: NURSE PRACTITIONER

## 2021-09-07 PROCEDURE — 83550 IRON BINDING TEST: CPT | Performed by: NURSE PRACTITIONER

## 2021-09-07 PROCEDURE — 99214 OFFICE O/P EST MOD 30 MIN: CPT | Mod: 25 | Performed by: NURSE PRACTITIONER

## 2021-09-07 PROCEDURE — 85027 COMPLETE CBC AUTOMATED: CPT | Performed by: NURSE PRACTITIONER

## 2021-09-07 PROCEDURE — 36415 COLL VENOUS BLD VENIPUNCTURE: CPT | Performed by: NURSE PRACTITIONER

## 2021-09-07 PROCEDURE — 84443 ASSAY THYROID STIM HORMONE: CPT | Performed by: NURSE PRACTITIONER

## 2021-09-07 PROCEDURE — G0008 ADMIN INFLUENZA VIRUS VAC: HCPCS | Performed by: NURSE PRACTITIONER

## 2021-09-07 RX ORDER — OMEPRAZOLE 40 MG/1
40 CAPSULE, DELAYED RELEASE ORAL DAILY
Qty: 90 CAPSULE | Refills: 3 | Status: SHIPPED | OUTPATIENT
Start: 2021-09-07 | End: 2022-10-13

## 2021-09-07 RX ORDER — AMLODIPINE BESYLATE 2.5 MG/1
2.5 TABLET ORAL DAILY
Qty: 90 TABLET | Refills: 3 | Status: SHIPPED | OUTPATIENT
Start: 2021-09-07 | End: 2023-03-23

## 2021-09-07 RX ORDER — HYDROXYZINE HYDROCHLORIDE 25 MG/1
25-50 TABLET, FILM COATED ORAL EVERY 8 HOURS PRN
Qty: 120 TABLET | Refills: 4 | Status: ON HOLD | OUTPATIENT
Start: 2021-09-07 | End: 2022-02-02

## 2021-09-07 RX ORDER — BUSPIRONE HYDROCHLORIDE 30 MG/1
30 TABLET ORAL 2 TIMES DAILY
Qty: 180 TABLET | Refills: 3 | Status: SHIPPED | OUTPATIENT
Start: 2021-09-07 | End: 2023-01-19

## 2021-09-07 RX ORDER — ATENOLOL 50 MG/1
50 TABLET ORAL DAILY
Qty: 90 TABLET | Refills: 3 | Status: SHIPPED | OUTPATIENT
Start: 2021-09-07 | End: 2022-12-26

## 2021-09-07 ASSESSMENT — PATIENT HEALTH QUESTIONNAIRE - PHQ9
SUM OF ALL RESPONSES TO PHQ QUESTIONS 1-9: 3
5. POOR APPETITE OR OVEREATING: NOT AT ALL

## 2021-09-07 ASSESSMENT — ANXIETY QUESTIONNAIRES
5. BEING SO RESTLESS THAT IT IS HARD TO SIT STILL: NOT AT ALL
2. NOT BEING ABLE TO STOP OR CONTROL WORRYING: NOT AT ALL
IF YOU CHECKED OFF ANY PROBLEMS ON THIS QUESTIONNAIRE, HOW DIFFICULT HAVE THESE PROBLEMS MADE IT FOR YOU TO DO YOUR WORK, TAKE CARE OF THINGS AT HOME, OR GET ALONG WITH OTHER PEOPLE: NOT DIFFICULT AT ALL
GAD7 TOTAL SCORE: 0
6. BECOMING EASILY ANNOYED OR IRRITABLE: NOT AT ALL
1. FEELING NERVOUS, ANXIOUS, OR ON EDGE: NOT AT ALL
7. FEELING AFRAID AS IF SOMETHING AWFUL MIGHT HAPPEN: NOT AT ALL
3. WORRYING TOO MUCH ABOUT DIFFERENT THINGS: NOT AT ALL

## 2021-09-07 ASSESSMENT — ACTIVITIES OF DAILY LIVING (ADL): CURRENT_FUNCTION: NO ASSISTANCE NEEDED

## 2021-09-07 ASSESSMENT — MIFFLIN-ST. JEOR: SCORE: 1180.03

## 2021-09-07 NOTE — PROGRESS NOTES
"SUBJECTIVE:   Mihaela Vance is a 61 year old female who presents for Preventive Visit.      Patient has been advised of split billing requirements and indicates understanding: Yes   Are you in the first 12 months of your Medicare coverage?  No    Healthy Habits:    In general, how would you rate your overall health?  Fair    Frequency of exercise:  None    Do you usually eat at least 4 servings of fruit and vegetables a day, include whole grains    & fiber and avoid regularly eating high fat or \"junk\" foods?  Yes    Taking medications regularly:  Yes    Barriers to taking medications:  None    Medication side effects:  None    Ability to successfully perform activities of daily living:  No assistance needed    Home Safety:  No safety concerns identified    Hearing Impairment:  Difficulty following a conversation in a noisy restaurant or crowded room, feel that people are mumbling or not speaking clearly, need to ask people to speak up or repeat themselves and difficulty understanding soft or whispered speech    In the past 6 months, have you been bothered by leaking of urine? Yes    In general, how would you rate your overall mental or emotional health?  Good      PHQ-2 Total Score:    Do you feel safe in your environment? Yes    Have you ever done Advance Care Planning? (For example, a Health Directive, POLST, or a discussion with a medical provider or your loved ones about your wishes): No, advance care planning information given to patient to review.  Patient plans to discuss their wishes with loved ones or provider.        Fall risk  Fallen 2 or more times in the past year?: Yes  Any fall with injury in the past year?: Yes    Cognitive Screening   1) Repeat 3 items (Leader, Season, Table)    2) Clock draw: NORMAL  3) 3 item recall: Recalls 3 objects  Results: 3 items recalled: COGNITIVE IMPAIRMENT LESS LIKELY    Mini-CogTM Copyright S Rhett. Licensed by the author for use in [facility]; reprinted " with permission ([email or web address]). All rights reserved.      Do you have sleep apnea, excessive snoring or daytime drowsiness?: no    Reviewed and updated as needed this visit by clinical staff  Tobacco  Allergies  Meds  Problems  Med Hx  Surg Hx  Fam Hx  Soc Hx          Reviewed and updated as needed this visit by Provider   Allergies  Meds  Problems            Social History     Tobacco Use     Smoking status: Never Smoker     Smokeless tobacco: Never Used   Substance Use Topics     Alcohol use: Yes     Comment: 2 days per week 3-4 drinks      If you drink alcohol do you typically have >3 drinks per day or >7 drinks per week? No    Alcohol Use 9/7/2021   Prescreen: >3 drinks/day or >7 drinks/week? No       Hypertension Follow-up      Do you check your blood pressure regularly outside of the clinic? No     Are you following a low salt diet? Yes    Are your blood pressures ever more than 140 on the top number (systolic) OR more   than 90 on the bottom number (diastolic), for example 140/90? rarely    Anxiety Follow-Up    How are you doing with your anxiety since your last visit? Improved     Are you having other symptoms that might be associated with anxiety? No    Have you had a significant life event? No     Are you feeling depressed? No    Do you have any concerns with your use of alcohol or other drugs? No    Social History     Tobacco Use     Smoking status: Never Smoker     Smokeless tobacco: Never Used   Substance Use Topics     Alcohol use: Yes     Comment: 2 days per week 3-4 drinks      Drug use: No     PERCY-7 SCORE 11/20/2012 10/16/2013 9/7/2021   Total Score 0 1 -   Total Score - - 0     PHQ 9/7/2021   PHQ-9 Total Score 3   Q9: Thoughts of better off dead/self-harm past 2 weeks Not at all           Current providers sharing in care for this patient include:  Patient Care Team:  Kylah Sánchez APRN CNP as PCP - General (Nurse Practitioner)  Ralph Aviles MD as MD (Hematology &  Oncology)  Ashlie Chan, RN as  (Hematology)  Kylah Sánhcez APRN CNP as Assigned PCP    The following health maintenance items are reviewed in Epic and correct as of today:  Health Maintenance Due   Topic Date Due     URINE DRUG SCREEN  Never done     ANNUAL REVIEW OF HM ORDERS  Never done     Pneumococcal Vaccine: Pediatrics (0 to 5 Years) and At-Risk Patients (6 to 64 Years) (1 of 2 - PPSV23) Never done     ZOSTER IMMUNIZATION (1 of 2) Never done     MAMMO SCREENING  09/05/2020     BP Readings from Last 3 Encounters:   09/07/21 100/88   07/29/20 124/80   01/10/20 135/84    Wt Readings from Last 3 Encounters:   09/07/21 57.2 kg (126 lb)   07/29/20 66.2 kg (146 lb)   10/04/19 63.1 kg (139 lb 3.2 oz)                  Patient Active Problem List   Diagnosis     Family history of diabetes mellitus     CARDIOVASCULAR SCREENING; LDL GOAL LESS THAN 160     Anxiety     Panic disorder     Hyponatremia     Hypertension, goal below 140/90     History of anemia     Fatty liver/ ?cirrhosis     Hyperlipidemia LDL goal <100     GI bleed     Chronic pain syndrome     Thiamine deficiency neuropathy     Iron deficiency anemia due to chronic blood loss     CKD (chronic kidney disease) stage 3, GFR 30-59 ml/min     Past Surgical History:   Procedure Laterality Date     BONE MARROW BIOPSY, BONE SPECIMEN, NEEDLE/TROCAR N/A 6/2/2015    Procedure: BIOPSY BONE MARROW;  Surgeon: Cady Rodriguez MD;  Location: [facility]     COLONOSCOPY  12/8/2010    COLONOSCOPY performed by MADAY BADILLO at [facility]     ESOPHAGOSCOPY, GASTROSCOPY, DUODENOSCOPY (EGD), COMBINED N/A 4/24/2019    Procedure: ESOPHAGOGASTRODUODENOSCOPY, WITH BIOPSY;  Surgeon: Nic Reardon DO;  Location: [facility]     ESOPHAGOSCOPY, GASTROSCOPY, DUODENOSCOPY (EGD), COMBINED N/A 6/19/2019    Procedure: ESOPHAGOGASTRODUODENOSCOPY, WITH BIOPSY;  Surgeon: Nic Reardon DO;  Location: [facility]     ORTHOPEDIC SURGERY  4/28/2005    Left knee  medial and lateral meniscal tears.       Social History     Tobacco Use     Smoking status: Never Smoker     Smokeless tobacco: Never Used   Substance Use Topics     Alcohol use: Yes     Comment: 2 days per week 3-4 drinks      Family History   Problem Relation Age of Onset     Cardiovascular Brother 40        3 heart attacks, last MI 55 years old     Hypertension Brother      Diabetes Brother      C.A.D. Father          age 77 MI     Heart Disease Father         heart attack     Prostate Cancer Father      Cardiovascular Mother         CHF     Diabetes Mother      Hypertension Mother      Obesity Mother      Psychotic Disorder Mother         hospitalized after birth of third child for 6 weeks, was hitting her head on the wall and also hitting her head with a croquet mallet, placed on Thorazine.     Psychotic Disorder Maternal Grandmother         attempted suicide         Current Outpatient Medications   Medication Sig Dispense Refill     alum & mag hydroxide-simethicone (MYLANTA/MAALOX) 200-200-20 MG/5ML SUSP suspension Take 30 mLs by mouth 2 times daily       amLODIPine (NORVASC) 2.5 MG tablet Take 1 tablet (2.5 mg) by mouth daily 90 tablet 3     atenolol (TENORMIN) 50 MG tablet Take 1 tablet (50 mg) by mouth daily 90 tablet 3     B Complex Vitamins (B COMPLEX PO) Take 1 tablet by mouth daily.       busPIRone HCl (BUSPAR) 30 MG tablet Take 1 tablet (30 mg) by mouth 2 times daily 180 tablet 3     calcium carbonate (TUMS) 500 MG chewable tablet Take 2 chew tab by mouth 3 times daily       Calcium Carbonate-Vitamin D (CALCIUM + D PO) Take 2 tablets by mouth daily.       cyclobenzaprine (FLEXERIL) 10 MG tablet Take 1 tablet (10 mg) by mouth nightly as needed for muscle spasms 30 tablet 1     diclofenac (VOLTAREN) 1 % topical gel Apply 2 g topically 4 times daily For moderate pain, Due for office visit July 2021 100 g 0     diclofenac (VOLTAREN) 1 % topical gel Place 2 g onto the skin 4 times daily as needed for  "moderate pain 60 g 3     ferrous sulfate (FEROSUL) 325 (65 Fe) MG tablet Take 1 tablet (325 mg) by mouth daily (with breakfast) Take with orange juice to enhance absorption. 100 tablet 3     FOLIC ACID PO Take 400 mcg by mouth daily       HYDROcodone-acetaminophen (NORCO) 5-325 MG tablet Take 1 tablet by mouth every 6 hours as needed for breakthrough pain or severe pain 20 tablet 0     hydrOXYzine (ATARAX) 25 MG tablet Take 1-2 tablets (25-50 mg) by mouth every 8 hours as needed for itching 120 tablet 4     magnesium oxide (MAG-OX) 400 MG tablet Take 1 tablet (400 mg) by mouth daily 60 tablet 3     Omega-3 Fatty Acids (FISH OIL PO) Take 1 capsule by mouth daily        omeprazole (PRILOSEC) 40 MG DR capsule Take 1 capsule (40 mg) by mouth daily 90 capsule 3     vitamin B1 (THIAMINE) 100 MG tablet Take 1 tab 3 times daily by mouth for 1 week and then decrease to 1 tab daily. 90 tablet 3     VITAMIN D, CHOLECALCIFEROL, PO Take 400 Units by mouth daily        Mammogram Screening: Mammogram Screening: Recommended mammography every 1-2 years with patient discussion and risk factor consideration  History of abnormal Pap smear: NO - age 65 - see link Cervical Cytology Screening Guidelines  Last 3 Pap and HPV Results:   PAP / HPV Latest Ref Rng & Units 7/21/2017 11/26/2014 12/22/2011   PAP (Historical) - NIL NIL NIL   HPV16 NEG Negative - -   HPV18 NEG Negative - -   HRHPV NEG Negative - -     Any new diagnosis of family breast, ovarian, or bowel cancer? No    FHS-7: No flowsheet data found.    Mammogram Screening: Recommended mammography every 1-2 years with patient discussion and risk factor consideration  Pertinent mammograms are reviewed under the imaging tab.    Review of Systems  Constitutional, HEENT, cardiovascular, pulmonary, gi and gu systems are negative, except as otherwise noted.    OBJECTIVE:   /88   Pulse 87   Temp 96.9  F (36.1  C) (Tympanic)   Resp 16   Ht 1.727 m (5' 8\")   Wt 57.2 kg (126 lb)   " "LMP 09/16/2006 (Exact Date)   SpO2 99%   BMI 19.16 kg/m   Estimated body mass index is 19.16 kg/m  as calculated from the following:    Height as of this encounter: 1.727 m (5' 8\").    Weight as of this encounter: 57.2 kg (126 lb).  Physical Exam  GENERAL: alert and no distress  EYES: Eyes grossly normal to inspection  NECK: no adenopathy, no asymmetry, masses, or scars and thyroid normal to palpation  RESP: lungs clear to auscultation - no rales, rhonchi or wheezes  CV: regular rates and rhythm, normal S1 S2, no S3 or S4, no murmur, click or rub and no peripheral edema  ABDOMEN: soft, nontender, without hepatosplenomegaly or masses  MS: no gross musculoskeletal defects noted, no edema  NEURO: Normal strength and tone, mentation intact and speech normal    Diagnostic Test Results:  Labs reviewed in Epic    ASSESSMENT / PLAN:   (Z00.00) Encounter for Medicare annual wellness exam  (primary encounter diagnosis)      (N18.31) Stage 3a chronic kidney disease  Monitoring:  Plan: Comprehensive metabolic panel (BMP + Alb, Alk         Phos, ALT, AST, Total. Bili, TP)            (E87.1) Hyponatremia    Plan: Comprehensive metabolic panel (BMP + Alb, Alk         Phos, ALT, AST, Total. Bili, TP), TSH with free        T4 reflex            (D50.0) Iron deficiency anemia due to chronic blood loss  Comment: Asymptomatic, taking iron qod  Plan: CBC with platelets, Ferritin, Iron and iron         binding capacity            (Z12.31) Screening mammogram, encounter for  Plan: *MA Screening Digital Bilateral            (Z87.81) Personal history of traumatic fracture  Plan: DX Hip/Pelvis/Spine            (Z78.0) Asymptomatic menopausal state   Plan: DX Hip/Pelvis/Spine            (Z23) Need for influenza vaccination      (I10) Essential hypertension with goal blood pressure less than 140/90  Comment: Controlled, continue:  Plan: atenolol (TENORMIN) 50 MG tablet, amLODIPine         (NORVASC) 2.5 MG tablet            (F41.9) " "Anxiety  Comment: Stable  Plan: busPIRone HCl (BUSPAR) 30 MG tablet,         hydrOXYzine (ATARAX) 25 MG tablet            (K92.2) Gastrointestinal hemorrhage, unspecified gastrointestinal hemorrhage type  Continue:  Plan: omeprazole (PRILOSEC) 40 MG DR capsule            (Z23) Need for prophylactic vaccination and inoculation against influenza  Plan: INFLUENZA QUAD, RECOMBINANT, P-FREE (RIV4)         (FLUBLOCK) [33973]              Patient has been advised of split billing requirements and indicates understanding: Yes  COUNSELING:  Reviewed preventive health counseling, as reflected in patient instructions    Estimated body mass index is 19.16 kg/m  as calculated from the following:    Height as of this encounter: 1.727 m (5' 8\").    Weight as of this encounter: 57.2 kg (126 lb).    Weight management plan noted, stable and monitoring    She reports that she has never smoked. She has never used smokeless tobacco.      Appropriate preventive services were discussed with this patient, including applicable screening as appropriate for cardiovascular disease, diabetes, osteopenia/osteoporosis, and glaucoma.  As appropriate for age/gender, discussed screening for colorectal cancer, prostate cancer, breast cancer, and cervical cancer. Checklist reviewing preventive services available has been given to the patient.    Reviewed patients plan of care and provided an AVS. The Intermediate Care Plan ( asthma action plan, low back pain action plan, and migraine action plan) for Mihaela meets the Care Plan requirement. This Care Plan has been established and reviewed with the Patient.    Counseling Resources:  ATP IV Guidelines  Pooled Cohorts Equation Calculator  Breast Cancer Risk Calculator  Breast Cancer: Medication to Reduce Risk  FRAX Risk Assessment  ICSI Preventive Guidelines  Dietary Guidelines for Americans, 2010  USDA's MyPlate  ASA Prophylaxis  Lung CA Screening    RON Lora [facility] " [place]    Identified Health Risks:

## 2021-09-07 NOTE — PROGRESS NOTES
"   SUBJECTIVE:   CC: Mihaela Vance is an 61 year old woman who presents for preventive health visit.       Patient has been advised of split billing requirements and indicates understanding: Yes  HPI      {additional problems to add (Optional):304556}    Today's PHQ-2 Score:   PHQ-2 ( 1999 Pfizer) 7/29/2020   Q1: Little interest or pleasure in doing things 0   Q2: Feeling down, depressed or hopeless 0   PHQ-2 Score 0       Abuse: Current or Past (Physical, Sexual or Emotional) - { :865957}  Do you feel safe in your environment? { :759826}    Have you ever done Advance Care Planning? (For example, a Health Directive, POLST, or a discussion with a medical provider or your loved ones about your wishes): { :839901}    Social History     Tobacco Use     Smoking status: Never Smoker     Smokeless tobacco: Never Used   Substance Use Topics     Alcohol use: Yes     Comment: 2 days per week 3-4 drinks      {Rooming Staff- Complete this question if Prescreen response is not shown below for today's visit. If you drink alcohol do you typically have >3 drinks per day or >7 drinks per week? (Optional):001857}    Alcohol Use 10/4/2019   Prescreen: >3 drinks/day or >7 drinks/week? { AUDIT responses all:TXT,51827}   {add AUDIT responses (Optional) (A score of 7 for adult men is an indication of hazardous drinking; a score of 8 or more is an indication of an alcohol use disorder.  A score of 7 or more for adult women is an indication of hazardous drinking or an alchohol use disorder):982792}    Reviewed orders with patient.  Reviewed health maintenance and updated orders accordingly - { :115893::\"Yes\"}  {Chronicprobdata (optional):543786}    Breast Cancer Screening:  Any new diagnosis of family breast, ovarian, or bowel cancer? {Yes_Link to Screening / No:540545}    FHS-7: No flowsheet data found.  {If any of the questions to the BCRA (FHS-7) are answered yes, consider ordering referral for genetic counseling (Optional) " ":532304::\"click delete button to remove this line now\"}  {AMB Mammogram Decision Support (Optional) :935728}  Pertinent mammograms are reviewed under the imaging tab.    History of abnormal Pap smear: { :053406}  PAP / HPV Latest Ref Rng & Units 7/21/2017 11/26/2014 12/22/2011   PAP (Historical) - NIL NIL NIL   HPV16 NEG Negative - -   HPV18 NEG Negative - -   HRHPV NEG Negative - -     Reviewed and updated as needed this visit by clinical staff                 Reviewed and updated as needed this visit by Provider                {HISTORY OPTIONS (Optional):891507}    Review of Systems  {FEMALE ROS (Optional):337701}     OBJECTIVE:   LMP 09/16/2006 (Exact Date)   Physical Exam  {Exam Choices (Optional):650576}    {Diagnostic Test Results (Optional):232189::\"Diagnostic Test Results:\",\"Labs reviewed in Epic\"}    ASSESSMENT/PLAN:   {Diag Picklist:115906}    Patient has been advised of split billing requirements and indicates understanding: {YES / NO:034066::\"Yes\"}  COUNSELING:  {FEMALE COUNSELING MESSAGES:139890::\"Reviewed preventive health counseling, as reflected in patient instructions\"}    Estimated body mass index is 22.2 kg/m  as calculated from the following:    Height as of 7/29/20: 1.727 m (5' 8\").    Weight as of 7/29/20: 66.2 kg (146 lb).    {Weight Management Plan (ACO) Complete if BMI is abnormal-  Ages 18-64  BMI >24.9.  Age 65+ with BMI <23 or >30 (Optional):595033}    She reports that she has never smoked. She has never used smokeless tobacco.      Counseling Resources:  ATP IV Guidelines  Pooled Cohorts Equation Calculator  Breast Cancer Risk Calculator  BRCA-Related Cancer Risk Assessment: FHS-7 Tool  FRAX Risk Assessment  ICSI Preventive Guidelines  Dietary Guidelines for Americans, 2010  USDA's MyPlate  ASA Prophylaxis  Lung CA Screening    [facility] L[facility]  "

## 2021-09-07 NOTE — LETTER
September 8, 2021      Mihaela Vance  [address] [address]        Dear ,    We are writing to inform you of your test results.    Patel Chairez- The blood counts are all stable, continue the iron supplements. Thyroid function is normal. Iron levels are good. Kidney function is improved. Electrolytes are all normal.  Recheck in 1 year. Please let us know if you have any questions.       Resulted Orders   TSH with free T4 reflex   Result Value Ref Range    TSH 0.65 0.40 - 4.00 mU/L   Iron and iron binding capacity   Result Value Ref Range    Iron 132 35 - 180 ug/dL    Iron Binding Capacity 246 240 - 430 ug/dL    Iron Sat Index 54 (H) 15 - 46 %   Ferritin   Result Value Ref Range    Ferritin 252 8 - 252 ng/mL   Comprehensive metabolic panel (BMP + Alb, Alk Phos, ALT, AST, Total. Bili, TP)   Result Value Ref Range    Sodium 133 133 - 144 mmol/L    Potassium 3.5 3.4 - 5.3 mmol/L    Chloride 99 94 - 109 mmol/L    Carbon Dioxide (CO2) 27 20 - 32 mmol/L    Anion Gap 7 3 - 14 mmol/L    Urea Nitrogen 14 7 - 30 mg/dL    Creatinine 1.02 0.52 - 1.04 mg/dL    Calcium 8.9 8.5 - 10.1 mg/dL    Glucose 134 (H) 70 - 99 mg/dL    Alkaline Phosphatase 167 (H) 40 - 150 U/L    AST 40 0 - 45 U/L    ALT 23 0 - 50 U/L    Protein Total 7.8 6.8 - 8.8 g/dL    Albumin 3.3 (L) 3.4 - 5.0 g/dL    Bilirubin Total 0.5 0.2 - 1.3 mg/dL    GFR Estimate 59 (L) >60 mL/min/1.73m2      Comment:      As of July 11, 2021, eGFR is calculated by the CKD-EPI creatinine equation, without race adjustment. eGFR can be influenced by muscle mass, exercise, and diet. The reported eGFR is an estimation only and is only applicable if the renal function is stable.   CBC with platelets   Result Value Ref Range    WBC Count 3.7 (L) 4.0 - 11.0 10e3/uL    RBC Count 3.28 (L) 3.80 - 5.20 10e6/uL    Hemoglobin 10.9 (L) 11.7 - 15.7 g/dL    Hematocrit 32.5 (L) 35.0 - 47.0 %    MCV 99 78 - 100 fL    MCH 33.2 (H) 26.5 - 33.0 pg    MCHC 33.5 31.5 - 36.5  g/dL    RDW 14.3 10.0 - 15.0 %    Platelet Count 279 150 - 450 10e3/uL       If you have any questions or concerns, please call the clinic at the number listed above.       Sincerely,      RON Lora CNP

## 2021-09-07 NOTE — PATIENT INSTRUCTIONS
Please call [facility] to schedule your imaging [phone].    Bone Density  Mammogram      Patient Education   Personalized Prevention Plan  You are due for the preventive services outlined below.  Your care team is available to assist you in scheduling these services.  If you have already completed any of these items, please share that information with your care team to update in your medical record.  Health Maintenance Due   Topic Date Due     URINE DRUG SCREEN  Never done     ANNUAL REVIEW OF HM ORDERS  Never done     Pneumococcal Vaccine (1 of 2 - PPSV23) Never done     Zoster (Shingles) Vaccine (1 of 2) Never done     Mammogram  09/05/2020     Flu Vaccine (1) 09/01/2021     Your Health Risk Assessment indicates you feel you are not in good health    A healthy lifestyle helps keep the body fit and the mind alert. It helps protect you from disease, helps you fight disease, and helps prevent chronic disease (disease that doesn't go away) from getting worse. This is important as you get older and begin to notice twinges in muscles and joints and a decline in the strength and stamina you once took for granted. A healthy lifestyle includes good healthcare, good nutrition, weight control, recreation, and regular exercise. Avoid harmful substances and do what you can to keep safe. Another part of a healthy lifestyle is stay mentally active and socially involved.    Good healthcare     Have a wellness visit every year.     If you have new symptoms, let us know right away. Don't wait until the next checkup.     Take medicines exactly as prescribed and keep your medicines in a safe place. Tell us if your medicine causes problems.   Healthy diet and weight control     Eat 3 or 4 small, nutritious, low-fat, high-fiber meals a day. Include a variety of fruits, vegetables, and whole-grain foods.     Make sure you get enough calcium in your diet. Calcium, vitamin D, and exercise help prevent  osteoporosis (bone thinning).     If you live alone, try eating with others when you can. That way you get a good meal and have company while you eat it.     Try to keep a healthy weight. If you eat more calories than your body uses for energy, it will be stored as fat and you will gain weight.     Recreation   Recreation is not limited to sports and team events. It includes any activity that provides relaxation, interest, enjoyment, and exercise. Recreation provides an outlet for physical, mental, and social energy. It can give a sense of worth and achievement. It can help you stay healthy.    Mental Exercise and Social Involvement  Mental and emotional health is as important as physical health. Keep in touch with friends and family. Stay as active as possible. Continue to learn and challenge yourself.   Things you can do to stay mentally active are:    Learn something new, like a foreign language or musical instrument.     Play SCRABBLE or do crossword puzzles. If you cannot find people to play these games with you at home, you can play them with others on your computer through the Internet.     Join a games club--anything from card games to chess or checkers or lawn bowling.     Start a new hobby.     Go back to school.     Volunteer.     Read.   Keep up with world events.    Exercise for a Healthier Heart  You may wonder how you can improve the health of your heart. If you re thinking about exercise, you re on the right track. You don t need to become an athlete. But you do need a certain amount of brisk exercise to help strengthen your heart. If you have been diagnosed with a heart condition, your healthcare provider may advise exercise to help stabilize your condition. To help make exercise a habit, choose safe, fun activities.      Exercise with a friend. When activity is fun, you're more likely to stick with it.   Before you start  Check with your healthcare provider before starting an exercise program. This  is especially important if you have not been active for a while. It's also important if you have a long-term (chronic) health problem such as heart disease, diabetes, or obesity. Or if you are at high risk for having these problems.   Why exercise?  Exercising regularly offers many healthy rewards. It can help you do all of the following:     Improve your blood cholesterol level to help prevent further heart trouble    Lower your blood pressure to help prevent a stroke or heart attack    Control diabetes, or reduce your risk of getting this disease    Improve your heart and lung function    Reach and stay at a healthy weight    Make your muscles stronger so you can stay active    Prevent falls and fractures by slowing the loss of bone mass (osteoporosis)    Manage stress better    Reduce your blood pressure    Improve your sense of self and your body image  Exercise tips      Ease into your routine. Set small goals. Then build on them. If you are not sure what your activity level should be, talk with your healthcare provider first before starting an exercise routine.    Exercise on most days. Aim for a total of 150 minutes (2 hours and 30 minutes) or more of moderate-intensity aerobic activity each week. Or 75 minutes (1 hour and 15 minutes) or more of vigorous-intensity aerobic activity each week. Or try for a combination of both. Moderate activity means that you breathe heavier and your heart rate increases but you can still talk. Think about doing 40 minutes of moderate exercise, 3 to 4 times a week. For best results, activity should last for about 40 minutes to lower blood pressure and cholesterol. It's OK to work up to the 40-minute period over time. Examples of moderate-intensity activity are walking 1 mile in 15 minutes. Or doing 30 to 45 minutes of yard work.    Step up your daily activity level.  Along with your exercise program, try being more active the whole day. Walk instead of drive. Or park further  away so that you take more steps each day. Do more household tasks or yard work. You may not be able to meet the advised mount of physical activity. But doing some moderate- or vigorous-intensity aerobic activity can help reduce your risk for heart disease. Your healthcare provider can help you figure out what is best for you.    Choose 1 or more activities you enjoy.  Walking is one of the easiest things you can do. You can also try swimming, riding a bike, dancing, or taking an exercise class.    When to call your healthcare provider  Call your healthcare provider if you have any of these:     Chest pain or feel dizzy or lightheaded    Burning, tightness, pressure, or heaviness in your chest, neck, shoulders, back, or arms    Abnormal shortness of breath    More joint or muscle pain    A very fast or irregular heartbeat (palpitations)  Lynsey last reviewed this educational content on 7/1/2019 2000-2021 [employer]. All rights reserved. This information is not intended as a substitute for professional medical care. Always follow your healthcare professional's instructions.          Signs of Hearing Loss      Hearing much better with one ear can be a sign of hearing loss.   Hearing loss is a problem shared by many people. In fact, it is one of the most common health problems, particularly as people age. Most people age 65 and older have some hearing loss. By age 80, almost everyone does. Hearing loss often occurs slowly over the years. So you may not realize your hearing has gotten worse.  Have your hearing checked  Call your healthcare provider if you:    Have to strain to hear normal conversation    Have to watch other people s faces very carefully to follow what they re saying    Need to ask people to repeat what they ve said    Often misunderstand what people are saying    Turn the volume of the television or radio up so high that others complain    Feel that people are mumbling when they re  talking to you    Find that the effort to hear leaves you feeling tired and irritated    Notice, when using the phone, that you hear better with one ear than the other  [place] last reviewed this educational content on 1/1/2020 2000-2021 [employer]. All rights reserved. This information is not intended as a substitute for professional medical care. Always follow your healthcare professional's instructions.          Urinary Incontinence, Female (Adult)   Urinary incontinence means loss of bladder control. This problem affects many women, especially as they get older. If you have incontinence, you may be embarrassed to ask for help. But know that this problem can be treated.   Types of Incontinence  There are different types of incontinence. Two of the main types are described here. You can have more than one type.     Stress incontinence. With this type, urine leaks when pressure (stress) is put on the bladder. This may happen when you cough, sneeze, or laugh. Stress incontinence most often occurs because the pelvic floor muscles that support the bladder and urethra are weak. This can happen after pregnancy and vaginal childbirth or a hysterectomy. It can also be due to excess body weight or hormone changes.    Urge incontinence (also called overactive bladder). With this type, a sudden urge to urinate is felt often. This may happen even though there may not be much urine in the bladder. The need to urinate often during the night is common. Urge incontinence most often occurs because of bladder spasms. This may be due to bladder irritation or infection. Damage to bladder nerves or pelvic muscles, constipation, and certain medicines can also lead to urge incontinence.  Treatment depends on the cause. Further evaluation is needed to find the type you have. This will likely include an exam and certain tests. Based on the results, you and your healthcare provider can then plan treatment. Until a  diagnosis is made, the home care tips below can help ease symptoms.   Home care    Do pelvic floor muscle exercises, if they are prescribed. The pelvic floor muscles help support the bladder and urethra. Many women find that their symptoms improve when doing special exercises that strengthen these muscles. To do the exercises, contract the muscles you would use to stop your stream of urine. But do this when you re not urinating. Hold for 10 seconds, then relax. Repeat 10 to 20 times in a row, at least 3 times a day. Your healthcare provider may give you other instructions for how to do the exercises and how often.    Keep a bladder diary. This helps track how often and how much you urinate over a set period of time. Bring this diary with you to your next visit with the provider. The information can help your provider learn more about your bladder problem.    Lose weight, if advised to by your provider. Extra weight puts pressure on the bladder. Your provider can help you create a weight-loss plan that s right for you. This may include exercising more and making certain diet changes.    Don't have foods and drinks that may irritate the bladder. These can include alcohol and caffeinated drinks.    Quit smoking. Smoking and other tobacco use can lead to a long-term (chronic) cough that strains the pelvic floor muscles. Smoking may also damage the bladder and urethra. Talk with your provider about treatments or methods you can use to quit smoking.    If drinking large amounts of fluid makes you have symptoms, you may be advised to limit your fluid intake. You may also be advised to drink most of your fluids during the day and to limit fluids at night.    If you re worried about urine leakage or accidents, you may wear absorbent pads to catch urine. Change the pads often. This helps reduce discomfort. It may also reduce the risk of skin or bladder infections.    Follow-up care  Follow up with your healthcare provider, or  as directed. It may take some to find the right treatment for your problem. But healthy lifestyle changes can be made right away. These include such things as exercising on a regular basis, eating a healthy diet, losing weight (if needed), and quitting smoking. Your treatment plan may include special therapies or medicines. Certain procedures or surgery may also be options. Talk about any questions you have with your provider.   When to seek medical advice  Call the healthcare provider right away if any of these occur:    Fever of 100.4 F (38 C) or higher, or as directed by your provider    Bladder pain or fullness    Belly swelling    Nausea or vomiting    Back pain    Weakness, dizziness, or fainting  StayWell last reviewed this educational content on 1/1/2020 2000-2021 [employer]. All rights reserved. This information is not intended as a substitute for professional medical care. Always follow your healthcare professional's instructions.          Preventing Falls at Home  A person can fall for many reasons. Older adults may fall because reaction time slows down as we age. Your muscles and joints may get stiff, weak, or less flexible because of illness, medicines, or a physical condition.   Other health problems that make falls more likely include:     Arthritis    Dizziness or lightheadedness when you stand up (orthostatic hypotension)    History of a stroke    Dizziness    Anemia    Certain medicines taken for mental illness or to control blood pressure.    Problems with balance or gait    Bladder or urinary problems    History of falling    Changes in vision (vision impairment)    Changes in thinking skills and memory (cognitive impairment)  Injuries from a fall can include serious injuries such as broken bones, dislocated joints, internal bleeding and cuts. Injuries like these can limit your independence.   Prevention tips  To help prevent falls and fall-related injuries, follow the tips  below.    Floors  To make floors safer:     Put nonskid pads under area rugs.    Remove small rugs.    Replace worn floor coverings.    Tack carpets firmly to each step on carpeted stairs. Put nonskid strips on the edges of uncarpeted stairs.    Keep floors and stairs free of clutter and cords.    Arrange furniture so there are clear pathways.    Clean up any spills right away.  Bathrooms    To make bathrooms safer:     Install grab bars in the tub or shower.    Apply nonskid strips or put a nonskid rubber mat in the tub or shower.    Sit on a bath chair to bathe.    Use bathmats with nonskid backing.  Lighting  To improve visibility in your home:      Keep a flashlight in each room. Or put a lamp next to the bed within easy reach.    Put nightlights in the bedrooms, hallways, kitchen, and bathrooms.    Make sure all stairways have good lighting.    Take your time when going up and down stairs.    Put handrails on both sides of stairs and in walkways for more support. To prevent injury to your wrist or arm, don t use handrails to pull yourself up.    Install grab bars to pull yourself up.    Move or rearrange items that you use often. This will make them easier to find or reach.    Look at your home to find any safety hazards. Especially look at doorways, walkways, and the driveway. Remove or repair any safety problems that you find.  Other changes to make    Look around to find any safety hazards. Look closely at doorways, walkways, and the driveway. Remove or repair any safety problems that you find.    Wear shoes that fit well.    Take your time when going up and down stairs.    Put handrails on both sides of stairs and in walkways for more support. To prevent injury to your wrist or arm, don t use handrails to pull yourself up.    Install grab bars wherever needed to pull yourself up.    Arrange items that you use often. This will make them easier to find or reach.    [employer] last reviewed this educational  content on 3/1/2020    2183-0019 [employer]. All rights reserved. This information is not intended as a substitute for professional medical care. Always follow your healthcare professional's instructions.           Patient Education     Bone Density Study  A bone density study helps diagnose brittle bones (osteoporosis). Scans of your lower back, hip, or forearm are taken to measure the amount of calcium (density) in your bones. Calcium is the mineral that makes up your bones.   What to tell your healthcare provider   Tell the technologist if you:    Are pregnant or think you could be pregnant    Have any metal in the part of your body being imaged, such as a hip replacement    Have had a recent nuclear medicine scan, CT scan with oral contrast, or an X-ray test with oral contrast (such as a barium enema, barium swallow, or upper GI)    Have a severely curved spine    Have had spinal surgery,    Have a history of spinal or hip fractures    Can t lie on your back  Before your test    Wear clothing without metal closures such as zippers or metal buttons.    Bring a list of medicines that you take.    During your test    You will lie on a table or sit.    Your lower legs may be raised on a platform.    A scanner arm moves back and forth over the part of your body being scanned.    Stay still and don't talk during the scan.    Follow instructions. This can help prevent the need for a second exam.     The technologist will adjust your body and the scanner during the exam.     After your test    You may need to wait for a short time while the images are reviewed.    Your healthcare provider will discuss the test results with you during a follow-up visit or over the phone.  Your next appointment is: _________________   Lynsey last reviewed this educational content on 4/1/2020 2000-2021 [employer]. All rights reserved. This information is not intended as a substitute for professional  medical care. Always follow your healthcare professional's instructions.           Patient Education     What Is Osteoporosis?  Osteoporosis is a disease that weakens the bones. Weakened bones are more likely to break (fracture). Osteoporosis affects both men and women. But postmenopausal women are most at risk. To help prevent osteoporosis, you need to exercise and nourish your bones throughout your life.     Childhood  The body builds the most bone during these years. That's why boys and girls need foods rich in calcium. They also need plenty of exercise. A healthy diet and exercise helps bones grow strong.   Young adulthood to age 30  During young adulthood, bones become their strongest. This is called peak bone mass. The same good habits that kept bones healthy in childhood help keep bones healthy in adulthood.   Age 30 to menopause  Bone mass declines slightly during these years. Your body makes just enough new bone to maintain peak bone mass. To keep your bones at their peak mass, be sure to exercise and get plenty of calcium.   After menopause  Menopause is when a woman stops having monthly periods. After menopause, the body makes less estrogen (female hormone). This increases bone loss. At this point, treatment may be needed to reduce the risk for fracture. Exercise and calcium can also help keep your bones strong.   Later in life  In later years, both men and women need to take extra care of their bones. By this point, the body loses more bone than it makes. If too much bone is lost, you may be at increased risk for fractures. With age, the quality and quantity of bone declines. You can lessen bone loss by staying active and increasing your calcium intake. Calcium supplements and other osteoporosis treatments do have risks. So talk with your healthcare provider if you have concerns. If you have osteoporosis, you can also learn ways to increase everyday safety.   [employer] last reviewed this educational  content on 3/1/2020    3057-4679 [employer]. All rights reserved. This information is not intended as a substitute for professional medical care. Always follow your healthcare professional's instructions.           Patient Education     Osteoporosis: Understanding Bone Loss  A balanced system supports the body     A balanced system keeps building and resorbing bone.     The body is always losing (resorbing) and making bone. This process is called remodeling. Bone-resorbing cells take bone apart. They do this so the minerals can be used to repair an injury or make new bone. Bone-making cells form new bone using calcium and other minerals. These minerals come from the food you eat. When this bone-making system is in balance, the same amount of bone is built and resorbed.   An unbalanced system can t give support     An unblalanced system builds too little bone and resorbs too much.     Changes in hormone levels, activity, medicines, or diet can affect the bone-making system. When the system gets out of balance, the amount of bone lost is greater than the amount of bone made. This can cause osteopenia. It is when bone starts to become less dense. Left untreated, bone loss gets worse, leading to osteoporosis. Weak bones can t support the body. In fact, they can break (fracture) just from the weight of your body. This often happens in bones of the spine (vertebrae). When vertebrae fracture, parts of the spine compress. This causes the back to bend or hump over, and it can also cause back pain.   [place] last reviewed this educational content on 5/1/2018 2000-2021 [employer]. All rights reserved. This information is not intended as a substitute for professional medical care. Always follow your healthcare professional's instructions.

## 2021-09-07 NOTE — PROGRESS NOTES
The patient was provided with suggestions to help her develop a healthy physical lifestyle.  She is at risk for lack of exercise and has been provided with information to increase physical activity for the benefit of her well-being.  The patient was provided with written information regarding signs of hearing loss.  Information on urinary incontinence and treatment options given to patient.  She is at risk for falling and has been provided with information to reduce the risk of falling at home.

## 2021-09-08 LAB — VIT B12 SERPL-MCNC: 497 PG/ML (ref 193–986)

## 2021-09-08 ASSESSMENT — ANXIETY QUESTIONNAIRES: GAD7 TOTAL SCORE: 0

## 2021-09-08 NOTE — RESULT ENCOUNTER NOTE
Please send patient letter notifying of labs and provider message.    Hi Mihaela- The blood counts are all stable, continue the iron supplements. Thyroid function is normal. Iron levels are good. Kidney function is improved. Electrolytes are all normal.  Recheck in 1 year. Please let us know if you have any questions.      Thanks,  RON Lopez CNP

## 2021-09-17 ENCOUNTER — TELEPHONE (OUTPATIENT)
Dept: FAMILY MEDICINE | Facility: CLINIC | Age: 61
End: 2021-09-17

## 2021-09-17 NOTE — TELEPHONE ENCOUNTER
Reason for call:  Other   Patient called regarding (reason for call):  dexa scan   Additional comments: Mihaela wanted to let you know that she is declining on scheduling the dexa scan.     Phone number to reach patient:  Home number on file [phone] (home)    Best Time:  Any     Can we leave a detailed message on this number?  NO    Travel screening: Not Applicable

## 2022-01-26 ENCOUNTER — HOSPITAL ENCOUNTER (EMERGENCY)
Facility: CLINIC | Age: 62
Discharge: ANOTHER HOSPITAL | End: 2022-01-27
Attending: FAMILY MEDICINE | Admitting: FAMILY MEDICINE
Payer: COMMERCIAL

## 2022-01-26 DIAGNOSIS — A41.9 SEPTIC SHOCK (H): ICD-10-CM

## 2022-01-26 DIAGNOSIS — R65.21 SEPTIC SHOCK (H): ICD-10-CM

## 2022-01-26 DIAGNOSIS — N17.9 AKI (ACUTE KIDNEY INJURY) (H): ICD-10-CM

## 2022-01-26 DIAGNOSIS — K92.1 GASTROINTESTINAL HEMORRHAGE WITH MELENA: Primary | ICD-10-CM

## 2022-01-26 DIAGNOSIS — F10.931 ALCOHOL WITHDRAWAL SYNDROME, WITH DELIRIUM (H): ICD-10-CM

## 2022-01-26 DIAGNOSIS — R79.89 ELEVATED TROPONIN: ICD-10-CM

## 2022-01-26 DIAGNOSIS — K92.2 GASTROINTESTINAL HEMORRHAGE, UNSPECIFIED GASTROINTESTINAL HEMORRHAGE TYPE: ICD-10-CM

## 2022-01-26 LAB
ABO/RH(D): NORMAL
ALBUMIN SERPL-MCNC: 3.3 G/DL (ref 3.4–5)
ALBUMIN UR-MCNC: 30 MG/DL
ALP SERPL-CCNC: 97 U/L (ref 40–150)
ALT SERPL W P-5'-P-CCNC: 24 U/L (ref 0–50)
ANION GAP SERPL CALCULATED.3IONS-SCNC: 21 MMOL/L (ref 3–14)
ANTIBODY SCREEN: NEGATIVE
APPEARANCE UR: ABNORMAL
AST SERPL W P-5'-P-CCNC: 31 U/L (ref 0–45)
BACTERIA #/AREA URNS HPF: ABNORMAL /HPF
BASOPHILS # BLD AUTO: 0 10E3/UL (ref 0–0.2)
BASOPHILS NFR BLD AUTO: 0 %
BILIRUB SERPL-MCNC: 0.9 MG/DL (ref 0.2–1.3)
BILIRUB UR QL STRIP: NEGATIVE
BLD PROD TYP BPU: NORMAL
BLOOD COMPONENT TYPE: NORMAL
BUN SERPL-MCNC: 109 MG/DL (ref 7–30)
CALCIUM SERPL-MCNC: 11.6 MG/DL (ref 8.5–10.1)
CHLORIDE BLD-SCNC: 80 MMOL/L (ref 94–109)
CO2 SERPL-SCNC: 27 MMOL/L (ref 20–32)
CODING SYSTEM: NORMAL
COLOR UR AUTO: YELLOW
CREAT SERPL-MCNC: 2.01 MG/DL (ref 0.52–1.04)
CROSSMATCH: NORMAL
CROSSMATCH: NORMAL
EOSINOPHIL # BLD AUTO: 0 10E3/UL (ref 0–0.7)
EOSINOPHIL NFR BLD AUTO: 0 %
ERYTHROCYTE [DISTWIDTH] IN BLOOD BY AUTOMATED COUNT: 13.6 % (ref 10–15)
ETHANOL SERPL-MCNC: <0.01 G/DL
GASTROCULT GAST QL: POSITIVE
GFR SERPL CREATININE-BSD FRML MDRD: 27 ML/MIN/1.73M2
GLUCOSE BLD-MCNC: 145 MG/DL (ref 70–99)
GLUCOSE UR STRIP-MCNC: NEGATIVE MG/DL
HCT VFR BLD AUTO: 27.8 % (ref 35–47)
HEMOCCULT STL QL: POSITIVE
HGB BLD-MCNC: 9.2 G/DL (ref 11.7–15.7)
HGB UR QL STRIP: ABNORMAL
HOLD SPECIMEN: NORMAL
HYALINE CASTS: 61 /LPF
IMM GRANULOCYTES # BLD: 0.1 10E3/UL
IMM GRANULOCYTES NFR BLD: 1 %
INR PPP: 1.28 (ref 0.85–1.15)
ISSUE DATE AND TIME: NORMAL
KETONES UR STRIP-MCNC: 5 MG/DL
LACTATE SERPL-SCNC: 1.3 MMOL/L (ref 0.7–2)
LACTATE SERPL-SCNC: 4.5 MMOL/L (ref 0.7–2)
LEUKOCYTE ESTERASE UR QL STRIP: NEGATIVE
LYMPHOCYTES # BLD AUTO: 1.5 10E3/UL (ref 0.8–5.3)
LYMPHOCYTES NFR BLD AUTO: 9 %
MCH RBC QN AUTO: 32.7 PG (ref 26.5–33)
MCHC RBC AUTO-ENTMCNC: 33.1 G/DL (ref 31.5–36.5)
MCV RBC AUTO: 99 FL (ref 78–100)
MONOCYTES # BLD AUTO: 1 10E3/UL (ref 0–1.3)
MONOCYTES NFR BLD AUTO: 6 %
MUCOUS THREADS #/AREA URNS LPF: PRESENT /LPF
NEUTROPHILS # BLD AUTO: 13.9 10E3/UL (ref 1.6–8.3)
NEUTROPHILS NFR BLD AUTO: 84 %
NITRATE UR QL: NEGATIVE
NRBC # BLD AUTO: 0 10E3/UL
NRBC BLD AUTO-RTO: 0 /100
PH GAST: ABNORMAL [PH]
PH UR STRIP: 5 [PH] (ref 5–7)
PLATELET # BLD AUTO: 303 10E3/UL (ref 150–450)
POTASSIUM BLD-SCNC: 3.5 MMOL/L (ref 3.4–5.3)
PROT SERPL-MCNC: 7.3 G/DL (ref 6.8–8.8)
RBC # BLD AUTO: 2.81 10E6/UL (ref 3.8–5.2)
RBC URINE: <1 /HPF
SARS-COV-2 RNA RESP QL NAA+PROBE: NEGATIVE
SODIUM SERPL-SCNC: 128 MMOL/L (ref 133–144)
SP GR UR STRIP: 1.02 (ref 1–1.03)
SPECIMEN EXPIRATION DATE: NORMAL
SQUAMOUS EPITHELIAL: 1 /HPF
TROPONIN I SERPL HS-MCNC: 287 NG/L
UNIT ABO/RH: NORMAL
UNIT NUMBER: NORMAL
UNIT STATUS: NORMAL
UNIT TYPE ISBT: 6200
UNIT TYPE ISBT: 6200
UNIT TYPE ISBT: 9500
UROBILINOGEN UR STRIP-MCNC: NORMAL MG/DL
WBC # BLD AUTO: 16.5 10E3/UL (ref 4–11)
WBC URINE: 1 /HPF

## 2022-01-26 PROCEDURE — 96375 TX/PRO/DX INJ NEW DRUG ADDON: CPT

## 2022-01-26 PROCEDURE — 87040 BLOOD CULTURE FOR BACTERIA: CPT | Mod: XU | Performed by: FAMILY MEDICINE

## 2022-01-26 PROCEDURE — 82947 ASSAY GLUCOSE BLOOD QUANT: CPT | Performed by: FAMILY MEDICINE

## 2022-01-26 PROCEDURE — 36415 COLL VENOUS BLD VENIPUNCTURE: CPT | Performed by: FAMILY MEDICINE

## 2022-01-26 PROCEDURE — C9803 HOPD COVID-19 SPEC COLLECT: HCPCS

## 2022-01-26 PROCEDURE — 86901 BLOOD TYPING SEROLOGIC RH(D): CPT | Performed by: FAMILY MEDICINE

## 2022-01-26 PROCEDURE — 87635 SARS-COV-2 COVID-19 AMP PRB: CPT | Performed by: FAMILY MEDICINE

## 2022-01-26 PROCEDURE — C9113 INJ PANTOPRAZOLE SODIUM, VIA: HCPCS | Performed by: FAMILY MEDICINE

## 2022-01-26 PROCEDURE — 99292 CRITICAL CARE ADDL 30 MIN: CPT | Mod: 25

## 2022-01-26 PROCEDURE — 96376 TX/PRO/DX INJ SAME DRUG ADON: CPT

## 2022-01-26 PROCEDURE — 96365 THER/PROPH/DIAG IV INF INIT: CPT

## 2022-01-26 PROCEDURE — 258N000003 HC RX IP 258 OP 636: Performed by: FAMILY MEDICINE

## 2022-01-26 PROCEDURE — 82272 OCCULT BLD FECES 1-3 TESTS: CPT | Performed by: FAMILY MEDICINE

## 2022-01-26 PROCEDURE — 86923 COMPATIBILITY TEST ELECTRIC: CPT | Performed by: FAMILY MEDICINE

## 2022-01-26 PROCEDURE — P9016 RBC LEUKOCYTES REDUCED: HCPCS

## 2022-01-26 PROCEDURE — 86923 COMPATIBILITY TEST ELECTRIC: CPT | Performed by: EMERGENCY MEDICINE

## 2022-01-26 PROCEDURE — 81001 URINALYSIS AUTO W/SCOPE: CPT | Performed by: FAMILY MEDICINE

## 2022-01-26 PROCEDURE — 85041 AUTOMATED RBC COUNT: CPT | Performed by: FAMILY MEDICINE

## 2022-01-26 PROCEDURE — 82040 ASSAY OF SERUM ALBUMIN: CPT | Performed by: FAMILY MEDICINE

## 2022-01-26 PROCEDURE — 84484 ASSAY OF TROPONIN QUANT: CPT | Performed by: FAMILY MEDICINE

## 2022-01-26 PROCEDURE — 99291 CRITICAL CARE FIRST HOUR: CPT | Performed by: INTERNAL MEDICINE

## 2022-01-26 PROCEDURE — P9016 RBC LEUKOCYTES REDUCED: HCPCS | Performed by: FAMILY MEDICINE

## 2022-01-26 PROCEDURE — 99291 CRITICAL CARE FIRST HOUR: CPT | Mod: 25

## 2022-01-26 PROCEDURE — 93010 ELECTROCARDIOGRAM REPORT: CPT | Performed by: FAMILY MEDICINE

## 2022-01-26 PROCEDURE — 93005 ELECTROCARDIOGRAM TRACING: CPT

## 2022-01-26 PROCEDURE — 83605 ASSAY OF LACTIC ACID: CPT | Mod: 91 | Performed by: FAMILY MEDICINE

## 2022-01-26 PROCEDURE — 96366 THER/PROPH/DIAG IV INF ADDON: CPT

## 2022-01-26 PROCEDURE — 99291 CRITICAL CARE FIRST HOUR: CPT | Mod: 25 | Performed by: FAMILY MEDICINE

## 2022-01-26 PROCEDURE — 96367 TX/PROPH/DG ADDL SEQ IV INF: CPT

## 2022-01-26 PROCEDURE — 250N000011 HC RX IP 250 OP 636: Performed by: FAMILY MEDICINE

## 2022-01-26 PROCEDURE — 36430 TRANSFUSION BLD/BLD COMPNT: CPT

## 2022-01-26 PROCEDURE — 82077 ASSAY SPEC XCP UR&BREATH IA: CPT | Performed by: FAMILY MEDICINE

## 2022-01-26 PROCEDURE — 96361 HYDRATE IV INFUSION ADD-ON: CPT

## 2022-01-26 PROCEDURE — 82271 OCCULT BLOOD OTHER SOURCES: CPT | Performed by: FAMILY MEDICINE

## 2022-01-26 PROCEDURE — 85610 PROTHROMBIN TIME: CPT | Performed by: FAMILY MEDICINE

## 2022-01-26 PROCEDURE — 99292 CRITICAL CARE ADDL 30 MIN: CPT | Performed by: FAMILY MEDICINE

## 2022-01-26 PROCEDURE — 80053 COMPREHEN METABOLIC PANEL: CPT | Performed by: FAMILY MEDICINE

## 2022-01-26 RX ORDER — LORAZEPAM 2 MG/ML
1 INJECTION INTRAMUSCULAR EVERY 10 MIN PRN
Status: DISCONTINUED | OUTPATIENT
Start: 2022-01-26 | End: 2022-01-26

## 2022-01-26 RX ORDER — LORAZEPAM 2 MG/ML
1 INJECTION INTRAMUSCULAR EVERY 10 MIN PRN
Status: DISCONTINUED | OUTPATIENT
Start: 2022-01-26 | End: 2022-01-27 | Stop reason: HOSPADM

## 2022-01-26 RX ORDER — OCTREOTIDE ACETATE 100 UG/ML
50 INJECTION, SOLUTION INTRAVENOUS; SUBCUTANEOUS ONCE
Status: COMPLETED | OUTPATIENT
Start: 2022-01-26 | End: 2022-01-26

## 2022-01-26 RX ORDER — LORAZEPAM 2 MG/ML
1 INJECTION INTRAMUSCULAR EVERY 10 MIN PRN
Status: COMPLETED | OUTPATIENT
Start: 2022-01-26 | End: 2022-01-26

## 2022-01-26 RX ORDER — CEFAZOLIN SODIUM 1 G/50ML
1250 SOLUTION INTRAVENOUS ONCE
Status: COMPLETED | OUTPATIENT
Start: 2022-01-26 | End: 2022-01-26

## 2022-01-26 RX ORDER — ROPIVACAINE IN 0.9% SOD CHL/PF 0.1 %
.03-.125 PLASTIC BAG, INJECTION (ML) EPIDURAL CONTINUOUS
Status: DISCONTINUED | OUTPATIENT
Start: 2022-01-26 | End: 2022-01-27 | Stop reason: HOSPADM

## 2022-01-26 RX ADMIN — SODIUM CHLORIDE 1000 ML: 9 INJECTION, SOLUTION INTRAVENOUS at 21:00

## 2022-01-26 RX ADMIN — OCTREOTIDE ACETATE 50 MCG/HR: 200 INJECTION, SOLUTION INTRAVENOUS; SUBCUTANEOUS at 19:07

## 2022-01-26 RX ADMIN — SODIUM CHLORIDE 1000 ML: 9 INJECTION, SOLUTION INTRAVENOUS at 23:26

## 2022-01-26 RX ADMIN — LORAZEPAM 1 MG: 2 INJECTION INTRAMUSCULAR; INTRAVENOUS at 23:43

## 2022-01-26 RX ADMIN — LORAZEPAM 1 MG: 2 INJECTION INTRAMUSCULAR; INTRAVENOUS at 22:56

## 2022-01-26 RX ADMIN — SODIUM CHLORIDE 1000 ML: 9 INJECTION, SOLUTION INTRAVENOUS at 19:20

## 2022-01-26 RX ADMIN — OCTREOTIDE ACETATE 50 MCG: 100 INJECTION, SOLUTION INTRAVENOUS; SUBCUTANEOUS at 19:07

## 2022-01-26 RX ADMIN — LORAZEPAM 1 MG: 2 INJECTION INTRAMUSCULAR; INTRAVENOUS at 20:20

## 2022-01-26 RX ADMIN — LORAZEPAM 1 MG: 2 INJECTION INTRAMUSCULAR; INTRAVENOUS at 21:42

## 2022-01-26 RX ADMIN — LORAZEPAM 1 MG: 2 INJECTION INTRAMUSCULAR; INTRAVENOUS at 22:32

## 2022-01-26 RX ADMIN — VANCOMYCIN HYDROCHLORIDE 1250 MG: 10 INJECTION, POWDER, LYOPHILIZED, FOR SOLUTION INTRAVENOUS at 21:59

## 2022-01-26 RX ADMIN — TAZOBACTAM SODIUM AND PIPERACILLIN SODIUM 3.38 G: 375; 3 INJECTION, SOLUTION INTRAVENOUS at 21:00

## 2022-01-26 RX ADMIN — PANTOPRAZOLE SODIUM 80 MG: 40 INJECTION, POWDER, FOR SOLUTION INTRAVENOUS at 19:11

## 2022-01-27 ENCOUNTER — APPOINTMENT (OUTPATIENT)
Dept: GENERAL RADIOLOGY | Facility: CLINIC | Age: 62
DRG: 380 | End: 2022-01-27
Attending: INTERNAL MEDICINE
Payer: COMMERCIAL

## 2022-01-27 ENCOUNTER — APPOINTMENT (OUTPATIENT)
Dept: CT IMAGING | Facility: CLINIC | Age: 62
DRG: 380 | End: 2022-01-27
Attending: INTERNAL MEDICINE
Payer: COMMERCIAL

## 2022-01-27 ENCOUNTER — APPOINTMENT (OUTPATIENT)
Dept: GENERAL RADIOLOGY | Facility: CLINIC | Age: 62
End: 2022-01-27
Attending: FAMILY MEDICINE
Payer: COMMERCIAL

## 2022-01-27 ENCOUNTER — HOSPITAL ENCOUNTER (INPATIENT)
Facility: CLINIC | Age: 62
LOS: 6 days | Discharge: HOME | DRG: 380 | End: 2022-02-02
Attending: INTERNAL MEDICINE | Admitting: INTERNAL MEDICINE
Payer: COMMERCIAL

## 2022-01-27 VITALS
HEART RATE: 101 BPM | DIASTOLIC BLOOD PRESSURE: 92 MMHG | WEIGHT: 131.8 LBS | OXYGEN SATURATION: 90 % | TEMPERATURE: 97.7 F | SYSTOLIC BLOOD PRESSURE: 133 MMHG | BODY MASS INDEX: 20.04 KG/M2 | RESPIRATION RATE: 17 BRPM

## 2022-01-27 DIAGNOSIS — K92.2 UGIB (UPPER GASTROINTESTINAL BLEED): ICD-10-CM

## 2022-01-27 DIAGNOSIS — F10.20 ALCOHOL USE DISORDER, MODERATE, DEPENDENCE (H): ICD-10-CM

## 2022-01-27 DIAGNOSIS — F10.20 ALCOHOLISM (H): Primary | ICD-10-CM

## 2022-01-27 DIAGNOSIS — K21.00 GASTROESOPHAGEAL REFLUX DISEASE WITH ESOPHAGITIS, UNSPECIFIED WHETHER HEMORRHAGE: ICD-10-CM

## 2022-01-27 DIAGNOSIS — R53.81 PHYSICAL DECONDITIONING: ICD-10-CM

## 2022-01-27 DIAGNOSIS — F41.9 ANXIETY: ICD-10-CM

## 2022-01-27 LAB
ALBUMIN SERPL-MCNC: 2.4 G/DL (ref 3.4–5)
ALP SERPL-CCNC: 72 U/L (ref 40–150)
ALT SERPL W P-5'-P-CCNC: 27 U/L (ref 0–50)
AMMONIA PLAS-SCNC: 22 UMOL/L (ref 10–50)
ANION GAP SERPL CALCULATED.3IONS-SCNC: 11 MMOL/L (ref 3–14)
ANION GAP SERPL CALCULATED.3IONS-SCNC: 6 MMOL/L (ref 3–14)
ANION GAP SERPL CALCULATED.3IONS-SCNC: 7 MMOL/L (ref 3–14)
ANION GAP SERPL CALCULATED.3IONS-SCNC: 7 MMOL/L (ref 3–14)
APAP SERPL-MCNC: <2 MG/L (ref 10–30)
AST SERPL W P-5'-P-CCNC: 63 U/L (ref 0–45)
BASE EXCESS BLDV CALC-SCNC: 5 MMOL/L (ref -7.7–1.9)
BASOPHILS # BLD AUTO: 0 10E3/UL (ref 0–0.2)
BASOPHILS NFR BLD AUTO: 0 %
BILIRUB DIRECT SERPL-MCNC: 0.3 MG/DL (ref 0–0.2)
BILIRUB SERPL-MCNC: 1.3 MG/DL (ref 0.2–1.3)
BLD PROD TYP BPU: NORMAL
BLOOD COMPONENT TYPE: NORMAL
BUN SERPL-MCNC: 113 MG/DL (ref 7–30)
BUN SERPL-MCNC: 65 MG/DL (ref 7–30)
BUN SERPL-MCNC: 70 MG/DL (ref 7–30)
BUN SERPL-MCNC: 96 MG/DL (ref 7–30)
CALCIUM SERPL-MCNC: 9.1 MG/DL (ref 8.5–10.1)
CALCIUM SERPL-MCNC: 9.1 MG/DL (ref 8.5–10.1)
CALCIUM SERPL-MCNC: 9.2 MG/DL (ref 8.5–10.1)
CALCIUM SERPL-MCNC: ABNORMAL MG/DL
CHLORIDE BLD-SCNC: 100 MMOL/L (ref 94–109)
CHLORIDE BLD-SCNC: 119 MMOL/L (ref 94–109)
CHLORIDE BLD-SCNC: 98 MMOL/L (ref 94–109)
CHLORIDE BLD-SCNC: 99 MMOL/L (ref 94–109)
CK SERPL-CCNC: 85 U/L (ref 30–225)
CO2 SERPL-SCNC: 20 MMOL/L (ref 20–32)
CO2 SERPL-SCNC: 23 MMOL/L (ref 20–32)
CO2 SERPL-SCNC: 30 MMOL/L (ref 20–32)
CO2 SERPL-SCNC: 30 MMOL/L (ref 20–32)
CODING SYSTEM: NORMAL
CREAT SERPL-MCNC: 0.92 MG/DL (ref 0.52–1.04)
CREAT SERPL-MCNC: 1.41 MG/DL (ref 0.52–1.04)
CREAT SERPL-MCNC: 1.72 MG/DL (ref 0.52–1.04)
CREAT SERPL-MCNC: 1.75 MG/DL (ref 0.52–1.04)
CROSSMATCH: NORMAL
EOSINOPHIL # BLD AUTO: 0 10E3/UL (ref 0–0.7)
EOSINOPHIL NFR BLD AUTO: 0 %
ERYTHROCYTE [DISTWIDTH] IN BLOOD BY AUTOMATED COUNT: 13.3 % (ref 10–15)
ERYTHROCYTE [DISTWIDTH] IN BLOOD BY AUTOMATED COUNT: 13.9 % (ref 10–15)
GFR SERPL CREATININE-BSD FRML MDRD: 32 ML/MIN/1.73M2
GFR SERPL CREATININE-BSD FRML MDRD: 33 ML/MIN/1.73M2
GFR SERPL CREATININE-BSD FRML MDRD: 42 ML/MIN/1.73M2
GFR SERPL CREATININE-BSD FRML MDRD: 70 ML/MIN/1.73M2
GLUCOSE BLD-MCNC: 116 MG/DL (ref 70–99)
GLUCOSE BLD-MCNC: 121 MG/DL (ref 70–99)
GLUCOSE BLD-MCNC: 130 MG/DL (ref 70–99)
GLUCOSE BLD-MCNC: 96 MG/DL (ref 70–99)
GLUCOSE BLDC GLUCOMTR-MCNC: 105 MG/DL (ref 70–99)
GLUCOSE BLDC GLUCOMTR-MCNC: 117 MG/DL (ref 70–99)
GLUCOSE BLDC GLUCOMTR-MCNC: 136 MG/DL (ref 70–99)
GLUCOSE BLDC GLUCOMTR-MCNC: 91 MG/DL (ref 70–99)
GLUCOSE BLDC GLUCOMTR-MCNC: 92 MG/DL (ref 70–99)
HCO3 BLDV-SCNC: 32 MMOL/L (ref 21–28)
HCT VFR BLD AUTO: 20.4 % (ref 35–47)
HCT VFR BLD AUTO: 35.6 % (ref 35–47)
HGB BLD-MCNC: 10.4 G/DL (ref 11.7–15.7)
HGB BLD-MCNC: 11.5 G/DL (ref 11.7–15.7)
HGB BLD-MCNC: 11.9 G/DL (ref 11.7–15.7)
HGB BLD-MCNC: 12.1 G/DL (ref 11.7–15.7)
HGB BLD-MCNC: 6.7 G/DL (ref 11.7–15.7)
HOLD SPECIMEN: NORMAL
IMM GRANULOCYTES # BLD: 0.1 10E3/UL
IMM GRANULOCYTES NFR BLD: 1 %
ISSUE DATE AND TIME: NORMAL
LACTATE SERPL-SCNC: 2.2 MMOL/L (ref 0.7–2)
LYMPHOCYTES # BLD AUTO: 0.8 10E3/UL (ref 0.8–5.3)
LYMPHOCYTES NFR BLD AUTO: 9 %
MAGNESIUM SERPL-MCNC: 2.6 MG/DL (ref 1.6–2.3)
MCH RBC QN AUTO: 31.3 PG (ref 26.5–33)
MCH RBC QN AUTO: 32.4 PG (ref 26.5–33)
MCHC RBC AUTO-ENTMCNC: 32.8 G/DL (ref 31.5–36.5)
MCHC RBC AUTO-ENTMCNC: 34 G/DL (ref 31.5–36.5)
MCV RBC AUTO: 92 FL (ref 78–100)
MCV RBC AUTO: 99 FL (ref 78–100)
MONOCYTES # BLD AUTO: 0.8 10E3/UL (ref 0–1.3)
MONOCYTES NFR BLD AUTO: 8 %
NEUTROPHILS # BLD AUTO: 7.7 10E3/UL (ref 1.6–8.3)
NEUTROPHILS NFR BLD AUTO: 82 %
NRBC # BLD AUTO: 0 10E3/UL
NRBC BLD AUTO-RTO: 0 /100
O2/TOTAL GAS SETTING VFR VENT: 21 %
PCO2 BLDV: 57 MM HG (ref 40–50)
PH BLDV: 7.36 [PH] (ref 7.32–7.43)
PHOSPHATE SERPL-MCNC: 1.9 MG/DL (ref 2.5–4.5)
PHOSPHATE SERPL-MCNC: 2 MG/DL (ref 2.5–4.5)
PLATELET # BLD AUTO: 114 10E3/UL (ref 150–450)
PLATELET # BLD AUTO: 159 10E3/UL (ref 150–450)
PO2 BLDV: 22 MM HG (ref 25–47)
POTASSIUM BLD-SCNC: 2.4 MMOL/L (ref 3.4–5.3)
POTASSIUM BLD-SCNC: 3.3 MMOL/L (ref 3.4–5.3)
POTASSIUM BLD-SCNC: 3.3 MMOL/L (ref 3.4–5.3)
POTASSIUM BLD-SCNC: 3.6 MMOL/L (ref 3.4–5.3)
POTASSIUM BLD-SCNC: 3.7 MMOL/L (ref 3.4–5.3)
POTASSIUM BLD-SCNC: 3.8 MMOL/L (ref 3.4–5.3)
PROT SERPL-MCNC: 5.8 G/DL (ref 6.8–8.8)
RBC # BLD AUTO: 2.07 10E6/UL (ref 3.8–5.2)
RBC # BLD AUTO: 3.87 10E6/UL (ref 3.8–5.2)
SODIUM SERPL-SCNC: 134 MMOL/L (ref 133–144)
SODIUM SERPL-SCNC: 135 MMOL/L (ref 133–144)
SODIUM SERPL-SCNC: 136 MMOL/L (ref 133–144)
SODIUM SERPL-SCNC: 145 MMOL/L (ref 133–144)
TROPONIN I SERPL HS-MCNC: 104 NG/L
TROPONIN I SERPL HS-MCNC: 69 NG/L
UNIT ABO/RH: NORMAL
UNIT NUMBER: NORMAL
UNIT STATUS: NORMAL
UNIT TYPE ISBT: 6200
UPPER GI ENDOSCOPY: NORMAL
WBC # BLD AUTO: 15.1 10E3/UL (ref 4–11)
WBC # BLD AUTO: 9.3 10E3/UL (ref 4–11)

## 2022-01-27 PROCEDURE — 85014 HEMATOCRIT: CPT | Performed by: STUDENT IN AN ORGANIZED HEALTH CARE EDUCATION/TRAINING PROGRAM

## 2022-01-27 PROCEDURE — 258N000003 HC RX IP 258 OP 636: Performed by: STUDENT IN AN ORGANIZED HEALTH CARE EDUCATION/TRAINING PROGRAM

## 2022-01-27 PROCEDURE — 80069 RENAL FUNCTION PANEL: CPT | Performed by: EMERGENCY MEDICINE

## 2022-01-27 PROCEDURE — 999N000127 HC STATISTIC PERIPHERAL IV START W US GUIDANCE

## 2022-01-27 PROCEDURE — 99291 CRITICAL CARE FIRST HOUR: CPT | Performed by: INTERNAL MEDICINE

## 2022-01-27 PROCEDURE — 82140 ASSAY OF AMMONIA: CPT | Performed by: NURSE PRACTITIONER

## 2022-01-27 PROCEDURE — 250N000009 HC RX 250: Performed by: INTERNAL MEDICINE

## 2022-01-27 PROCEDURE — 84132 ASSAY OF SERUM POTASSIUM: CPT | Performed by: INTERNAL MEDICINE

## 2022-01-27 PROCEDURE — 82803 BLOOD GASES ANY COMBINATION: CPT | Performed by: STUDENT IN AN ORGANIZED HEALTH CARE EDUCATION/TRAINING PROGRAM

## 2022-01-27 PROCEDURE — P9016 RBC LEUKOCYTES REDUCED: HCPCS | Performed by: EMERGENCY MEDICINE

## 2022-01-27 PROCEDURE — 99222 1ST HOSP IP/OBS MODERATE 55: CPT | Performed by: NURSE PRACTITIONER

## 2022-01-27 PROCEDURE — 200N000002 HC R&B ICU UMMC

## 2022-01-27 PROCEDURE — 85018 HEMOGLOBIN: CPT | Performed by: NURSE PRACTITIONER

## 2022-01-27 PROCEDURE — 83735 ASSAY OF MAGNESIUM: CPT | Performed by: STUDENT IN AN ORGANIZED HEALTH CARE EDUCATION/TRAINING PROGRAM

## 2022-01-27 PROCEDURE — 80053 COMPREHEN METABOLIC PANEL: CPT | Performed by: EMERGENCY MEDICINE

## 2022-01-27 PROCEDURE — 70450 CT HEAD/BRAIN W/O DYE: CPT | Mod: 26 | Performed by: STUDENT IN AN ORGANIZED HEALTH CARE EDUCATION/TRAINING PROGRAM

## 2022-01-27 PROCEDURE — 84100 ASSAY OF PHOSPHORUS: CPT | Performed by: STUDENT IN AN ORGANIZED HEALTH CARE EDUCATION/TRAINING PROGRAM

## 2022-01-27 PROCEDURE — 80143 DRUG ASSAY ACETAMINOPHEN: CPT | Performed by: NURSE PRACTITIONER

## 2022-01-27 PROCEDURE — 82248 BILIRUBIN DIRECT: CPT | Performed by: NURSE PRACTITIONER

## 2022-01-27 PROCEDURE — 250N000013 HC RX MED GY IP 250 OP 250 PS 637: Performed by: STUDENT IN AN ORGANIZED HEALTH CARE EDUCATION/TRAINING PROGRAM

## 2022-01-27 PROCEDURE — 250N000013 HC RX MED GY IP 250 OP 250 PS 637: Performed by: INTERNAL MEDICINE

## 2022-01-27 PROCEDURE — 84132 ASSAY OF SERUM POTASSIUM: CPT | Performed by: NURSE PRACTITIONER

## 2022-01-27 PROCEDURE — 36415 COLL VENOUS BLD VENIPUNCTURE: CPT | Performed by: EMERGENCY MEDICINE

## 2022-01-27 PROCEDURE — 83605 ASSAY OF LACTIC ACID: CPT | Performed by: STUDENT IN AN ORGANIZED HEALTH CARE EDUCATION/TRAINING PROGRAM

## 2022-01-27 PROCEDURE — 36415 COLL VENOUS BLD VENIPUNCTURE: CPT | Performed by: INTERNAL MEDICINE

## 2022-01-27 PROCEDURE — 0DJ08ZZ INSPECTION OF UPPER INTESTINAL TRACT, VIA NATURAL OR ARTIFICIAL OPENING ENDOSCOPIC: ICD-10-PCS | Performed by: INTERNAL MEDICINE

## 2022-01-27 PROCEDURE — 71045 X-RAY EXAM CHEST 1 VIEW: CPT

## 2022-01-27 PROCEDURE — C9113 INJ PANTOPRAZOLE SODIUM, VIA: HCPCS | Performed by: NURSE PRACTITIONER

## 2022-01-27 PROCEDURE — 85027 COMPLETE CBC AUTOMATED: CPT | Performed by: EMERGENCY MEDICINE

## 2022-01-27 PROCEDURE — 36415 COLL VENOUS BLD VENIPUNCTURE: CPT | Performed by: NURSE PRACTITIONER

## 2022-01-27 PROCEDURE — 82550 ASSAY OF CK (CPK): CPT | Performed by: STUDENT IN AN ORGANIZED HEALTH CARE EDUCATION/TRAINING PROGRAM

## 2022-01-27 PROCEDURE — 258N000003 HC RX IP 258 OP 636: Performed by: INTERNAL MEDICINE

## 2022-01-27 PROCEDURE — 250N000011 HC RX IP 250 OP 636: Performed by: NURSE PRACTITIONER

## 2022-01-27 PROCEDURE — 250N000011 HC RX IP 250 OP 636

## 2022-01-27 PROCEDURE — 71045 X-RAY EXAM CHEST 1 VIEW: CPT | Mod: 26 | Performed by: RADIOLOGY

## 2022-01-27 PROCEDURE — 43235 EGD DIAGNOSTIC BRUSH WASH: CPT | Performed by: INTERNAL MEDICINE

## 2022-01-27 PROCEDURE — 84100 ASSAY OF PHOSPHORUS: CPT | Performed by: INTERNAL MEDICINE

## 2022-01-27 PROCEDURE — 250N000011 HC RX IP 250 OP 636: Performed by: STUDENT IN AN ORGANIZED HEALTH CARE EDUCATION/TRAINING PROGRAM

## 2022-01-27 PROCEDURE — 258N000003 HC RX IP 258 OP 636: Performed by: NURSE PRACTITIONER

## 2022-01-27 PROCEDURE — 93005 ELECTROCARDIOGRAM TRACING: CPT

## 2022-01-27 PROCEDURE — 84132 ASSAY OF SERUM POTASSIUM: CPT | Performed by: STUDENT IN AN ORGANIZED HEALTH CARE EDUCATION/TRAINING PROGRAM

## 2022-01-27 PROCEDURE — 70450 CT HEAD/BRAIN W/O DYE: CPT

## 2022-01-27 PROCEDURE — 71045 X-RAY EXAM CHEST 1 VIEW: CPT | Mod: 77

## 2022-01-27 PROCEDURE — 85018 HEMOGLOBIN: CPT | Performed by: STUDENT IN AN ORGANIZED HEALTH CARE EDUCATION/TRAINING PROGRAM

## 2022-01-27 PROCEDURE — 93010 ELECTROCARDIOGRAM REPORT: CPT | Performed by: INTERNAL MEDICINE

## 2022-01-27 PROCEDURE — 84484 ASSAY OF TROPONIN QUANT: CPT | Performed by: NURSE PRACTITIONER

## 2022-01-27 PROCEDURE — 250N000011 HC RX IP 250 OP 636: Performed by: FAMILY MEDICINE

## 2022-01-27 PROCEDURE — 36415 COLL VENOUS BLD VENIPUNCTURE: CPT | Performed by: STUDENT IN AN ORGANIZED HEALTH CARE EDUCATION/TRAINING PROGRAM

## 2022-01-27 RX ORDER — NALOXONE HYDROCHLORIDE 0.4 MG/ML
0.4 INJECTION, SOLUTION INTRAMUSCULAR; INTRAVENOUS; SUBCUTANEOUS
Status: DISCONTINUED | OUTPATIENT
Start: 2022-01-27 | End: 2022-02-02 | Stop reason: HOSPADM

## 2022-01-27 RX ORDER — DIAZEPAM 5 MG
10 TABLET ORAL EVERY 30 MIN PRN
Status: DISCONTINUED | OUTPATIENT
Start: 2022-01-27 | End: 2022-01-27

## 2022-01-27 RX ORDER — DEXTROSE MONOHYDRATE 25 G/50ML
25-50 INJECTION, SOLUTION INTRAVENOUS
Status: DISCONTINUED | OUTPATIENT
Start: 2022-01-27 | End: 2022-02-02 | Stop reason: HOSPADM

## 2022-01-27 RX ORDER — DEXMEDETOMIDINE HYDROCHLORIDE 4 UG/ML
.1-1.2 INJECTION, SOLUTION INTRAVENOUS CONTINUOUS
Status: DISCONTINUED | OUTPATIENT
Start: 2022-01-27 | End: 2022-01-27

## 2022-01-27 RX ORDER — PROCHLORPERAZINE MALEATE 5 MG
10 TABLET ORAL EVERY 6 HOURS PRN
Status: DISCONTINUED | OUTPATIENT
Start: 2022-01-27 | End: 2022-02-02 | Stop reason: HOSPADM

## 2022-01-27 RX ORDER — BUSPIRONE HYDROCHLORIDE 30 MG/1
30 TABLET ORAL 2 TIMES DAILY
Status: DISCONTINUED | OUTPATIENT
Start: 2022-01-27 | End: 2022-02-02 | Stop reason: HOSPADM

## 2022-01-27 RX ORDER — FENTANYL CITRATE 50 UG/ML
50 INJECTION, SOLUTION INTRAMUSCULAR; INTRAVENOUS
Status: DISCONTINUED | OUTPATIENT
Start: 2022-01-27 | End: 2022-01-27

## 2022-01-27 RX ORDER — HYDROXYZINE HYDROCHLORIDE 25 MG/1
25-50 TABLET, FILM COATED ORAL EVERY 8 HOURS PRN
Status: DISCONTINUED | OUTPATIENT
Start: 2022-01-27 | End: 2022-01-27

## 2022-01-27 RX ORDER — PROCHLORPERAZINE 25 MG
25 SUPPOSITORY, RECTAL RECTAL EVERY 12 HOURS PRN
Status: DISCONTINUED | OUTPATIENT
Start: 2022-01-27 | End: 2022-02-02 | Stop reason: HOSPADM

## 2022-01-27 RX ORDER — AMOXICILLIN 250 MG
2 CAPSULE ORAL 2 TIMES DAILY PRN
Status: DISCONTINUED | OUTPATIENT
Start: 2022-01-27 | End: 2022-02-02 | Stop reason: HOSPADM

## 2022-01-27 RX ORDER — HALOPERIDOL 5 MG/ML
2.5-5 INJECTION INTRAMUSCULAR EVERY 4 HOURS PRN
Status: DISCONTINUED | OUTPATIENT
Start: 2022-01-27 | End: 2022-01-30

## 2022-01-27 RX ORDER — VALPROIC ACID 250 MG/1
250 CAPSULE, LIQUID FILLED ORAL 2 TIMES DAILY
Status: DISCONTINUED | OUTPATIENT
Start: 2022-01-27 | End: 2022-01-27

## 2022-01-27 RX ORDER — FOLIC ACID 5 MG/ML
1 INJECTION, SOLUTION INTRAMUSCULAR; INTRAVENOUS; SUBCUTANEOUS ONCE
Status: COMPLETED | OUTPATIENT
Start: 2022-01-27 | End: 2022-01-27

## 2022-01-27 RX ORDER — VALPROIC ACID 250 MG/1
500 CAPSULE, LIQUID FILLED ORAL AT BEDTIME
Status: DISCONTINUED | OUTPATIENT
Start: 2022-01-27 | End: 2022-01-27

## 2022-01-27 RX ORDER — CLONIDINE 0.1 MG/24H
1 PATCH, EXTENDED RELEASE TRANSDERMAL WEEKLY
Status: DISCONTINUED | OUTPATIENT
Start: 2022-01-27 | End: 2022-01-30

## 2022-01-27 RX ORDER — FOLIC ACID 1 MG/1
1 TABLET ORAL DAILY
Status: DISCONTINUED | OUTPATIENT
Start: 2022-01-30 | End: 2022-02-02 | Stop reason: HOSPADM

## 2022-01-27 RX ORDER — AMOXICILLIN 250 MG
1 CAPSULE ORAL 2 TIMES DAILY PRN
Status: DISCONTINUED | OUTPATIENT
Start: 2022-01-27 | End: 2022-02-02 | Stop reason: HOSPADM

## 2022-01-27 RX ORDER — FLUMAZENIL 0.1 MG/ML
0.2 INJECTION, SOLUTION INTRAVENOUS
Status: DISCONTINUED | OUTPATIENT
Start: 2022-01-27 | End: 2022-01-30

## 2022-01-27 RX ORDER — SODIUM CHLORIDE, SODIUM LACTATE, POTASSIUM CHLORIDE, CALCIUM CHLORIDE 600; 310; 30; 20 MG/100ML; MG/100ML; MG/100ML; MG/100ML
INJECTION, SOLUTION INTRAVENOUS CONTINUOUS
Status: ACTIVE | OUTPATIENT
Start: 2022-01-27 | End: 2022-01-27

## 2022-01-27 RX ORDER — DEXMEDETOMIDINE HYDROCHLORIDE 4 UG/ML
.1-1.2 INJECTION, SOLUTION INTRAVENOUS CONTINUOUS
Status: DISCONTINUED | OUTPATIENT
Start: 2022-01-27 | End: 2022-01-28

## 2022-01-27 RX ORDER — FENTANYL CITRATE 50 UG/ML
200 INJECTION, SOLUTION INTRAMUSCULAR; INTRAVENOUS
Status: COMPLETED | OUTPATIENT
Start: 2022-01-28 | End: 2022-01-27

## 2022-01-27 RX ORDER — NICOTINE POLACRILEX 4 MG
15-30 LOZENGE BUCCAL
Status: DISCONTINUED | OUTPATIENT
Start: 2022-01-27 | End: 2022-02-02 | Stop reason: HOSPADM

## 2022-01-27 RX ORDER — DIAZEPAM 10 MG/2ML
5-10 INJECTION, SOLUTION INTRAMUSCULAR; INTRAVENOUS EVERY 30 MIN PRN
Status: DISCONTINUED | OUTPATIENT
Start: 2022-01-27 | End: 2022-01-27

## 2022-01-27 RX ORDER — POTASSIUM CHLORIDE 20MEQ/15ML
20 LIQUID (ML) ORAL ONCE
Status: COMPLETED | OUTPATIENT
Start: 2022-01-27 | End: 2022-01-27

## 2022-01-27 RX ORDER — METOCLOPRAMIDE HYDROCHLORIDE 5 MG/ML
10 INJECTION INTRAMUSCULAR; INTRAVENOUS ONCE
Status: COMPLETED | OUTPATIENT
Start: 2022-01-27 | End: 2022-01-27

## 2022-01-27 RX ORDER — FENTANYL CITRATE 50 UG/ML
INJECTION, SOLUTION INTRAMUSCULAR; INTRAVENOUS
Status: COMPLETED
Start: 2022-01-27 | End: 2022-01-27

## 2022-01-27 RX ORDER — OXYCODONE HYDROCHLORIDE 5 MG/1
5 TABLET ORAL EVERY 6 HOURS PRN
Status: DISCONTINUED | OUTPATIENT
Start: 2022-01-27 | End: 2022-01-30

## 2022-01-27 RX ORDER — ONDANSETRON 4 MG/1
4 TABLET, ORALLY DISINTEGRATING ORAL EVERY 6 HOURS PRN
Status: DISCONTINUED | OUTPATIENT
Start: 2022-01-27 | End: 2022-02-02 | Stop reason: HOSPADM

## 2022-01-27 RX ORDER — CEFTRIAXONE 1 G/1
1 INJECTION, POWDER, FOR SOLUTION INTRAMUSCULAR; INTRAVENOUS EVERY 24 HOURS
Status: DISCONTINUED | OUTPATIENT
Start: 2022-01-27 | End: 2022-01-27

## 2022-01-27 RX ORDER — NALOXONE HYDROCHLORIDE 0.4 MG/ML
0.2 INJECTION, SOLUTION INTRAMUSCULAR; INTRAVENOUS; SUBCUTANEOUS
Status: DISCONTINUED | OUTPATIENT
Start: 2022-01-27 | End: 2022-02-02 | Stop reason: HOSPADM

## 2022-01-27 RX ORDER — CLONIDINE HYDROCHLORIDE 0.1 MG/1
0.1 TABLET ORAL EVERY 8 HOURS
Status: DISCONTINUED | OUTPATIENT
Start: 2022-01-27 | End: 2022-01-27

## 2022-01-27 RX ORDER — PANTOPRAZOLE SODIUM 40 MG/1
40 TABLET, DELAYED RELEASE ORAL 2 TIMES DAILY
Status: DISCONTINUED | OUTPATIENT
Start: 2022-01-27 | End: 2022-01-27 | Stop reason: CLARIF

## 2022-01-27 RX ORDER — MULTIPLE VITAMINS W/ MINERALS TAB 9MG-400MCG
1 TAB ORAL DAILY
Status: DISCONTINUED | OUTPATIENT
Start: 2022-01-27 | End: 2022-02-02 | Stop reason: HOSPADM

## 2022-01-27 RX ORDER — FOLIC ACID 5 MG/ML
1 INJECTION, SOLUTION INTRAMUSCULAR; INTRAVENOUS; SUBCUTANEOUS DAILY
Status: COMPLETED | OUTPATIENT
Start: 2022-01-28 | End: 2022-01-29

## 2022-01-27 RX ORDER — POTASSIUM CHLORIDE 1.5 G/1.58G
20 POWDER, FOR SOLUTION ORAL ONCE
Status: COMPLETED | OUTPATIENT
Start: 2022-01-27 | End: 2022-01-27

## 2022-01-27 RX ORDER — ONDANSETRON 2 MG/ML
4 INJECTION INTRAMUSCULAR; INTRAVENOUS EVERY 6 HOURS PRN
Status: DISCONTINUED | OUTPATIENT
Start: 2022-01-27 | End: 2022-02-02 | Stop reason: HOSPADM

## 2022-01-27 RX ADMIN — PANTOPRAZOLE SODIUM 40 MG: 40 INJECTION, POWDER, FOR SOLUTION INTRAVENOUS at 20:28

## 2022-01-27 RX ADMIN — POTASSIUM PHOSPHATE, MONOBASIC AND POTASSIUM PHOSPHATE, DIBASIC 9 MMOL: 224; 236 INJECTION, SOLUTION, CONCENTRATE INTRAVENOUS at 05:20

## 2022-01-27 RX ADMIN — PANTOPRAZOLE SODIUM 40 MG: 40 INJECTION, POWDER, FOR SOLUTION INTRAVENOUS at 09:42

## 2022-01-27 RX ADMIN — POTASSIUM CHLORIDE 20 MEQ: 1.5 POWDER, FOR SOLUTION ORAL at 20:55

## 2022-01-27 RX ADMIN — TOPICAL ANESTHETIC 1 SPRAY: 200 SPRAY DENTAL; PERIODONTAL at 10:10

## 2022-01-27 RX ADMIN — FENTANYL CITRATE 100 MCG: 50 INJECTION, SOLUTION INTRAMUSCULAR; INTRAVENOUS at 10:10

## 2022-01-27 RX ADMIN — CLONIDINE 1 PATCH: 0.1 PATCH TRANSDERMAL at 04:42

## 2022-01-27 RX ADMIN — FOLIC ACID 1 MG: 5 INJECTION, SOLUTION INTRAMUSCULAR; INTRAVENOUS; SUBCUTANEOUS at 03:05

## 2022-01-27 RX ADMIN — MIDAZOLAM 2 MG: 1 INJECTION INTRAMUSCULAR; INTRAVENOUS at 10:10

## 2022-01-27 RX ADMIN — SODIUM CHLORIDE, POTASSIUM CHLORIDE, SODIUM LACTATE AND CALCIUM CHLORIDE: 600; 310; 30; 20 INJECTION, SOLUTION INTRAVENOUS at 12:07

## 2022-01-27 RX ADMIN — POTASSIUM CHLORIDE 20 MEQ: 40 SOLUTION ORAL at 22:33

## 2022-01-27 RX ADMIN — SODIUM CHLORIDE, POTASSIUM CHLORIDE, SODIUM LACTATE AND CALCIUM CHLORIDE: 600; 310; 30; 20 INJECTION, SOLUTION INTRAVENOUS at 13:02

## 2022-01-27 RX ADMIN — THIAMINE HYDROCHLORIDE 200 MG: 100 INJECTION, SOLUTION INTRAMUSCULAR; INTRAVENOUS at 20:28

## 2022-01-27 RX ADMIN — FENTANYL CITRATE 100 MCG: 50 INJECTION INTRAMUSCULAR; INTRAVENOUS at 10:10

## 2022-01-27 RX ADMIN — METOCLOPRAMIDE HYDROCHLORIDE 10 MG: 5 INJECTION INTRAMUSCULAR; INTRAVENOUS at 09:26

## 2022-01-27 RX ADMIN — SODIUM CHLORIDE, POTASSIUM CHLORIDE, SODIUM LACTATE AND CALCIUM CHLORIDE: 600; 310; 30; 20 INJECTION, SOLUTION INTRAVENOUS at 03:52

## 2022-01-27 RX ADMIN — THIAMINE HYDROCHLORIDE 200 MG: 100 INJECTION, SOLUTION INTRAMUSCULAR; INTRAVENOUS at 13:45

## 2022-01-27 RX ADMIN — SODIUM CHLORIDE, POTASSIUM CHLORIDE, SODIUM LACTATE AND CALCIUM CHLORIDE 1000 ML: 600; 310; 30; 20 INJECTION, SOLUTION INTRAVENOUS at 02:59

## 2022-01-27 RX ADMIN — ONDANSETRON 4 MG: 2 INJECTION INTRAMUSCULAR; INTRAVENOUS at 22:41

## 2022-01-27 RX ADMIN — THIAMINE HYDROCHLORIDE 200 MG: 100 INJECTION, SOLUTION INTRAMUSCULAR; INTRAVENOUS at 09:30

## 2022-01-27 RX ADMIN — LORAZEPAM 1 MG: 2 INJECTION INTRAMUSCULAR; INTRAVENOUS at 01:05

## 2022-01-27 ASSESSMENT — ACTIVITIES OF DAILY LIVING (ADL)
DIFFICULTY_EATING/SWALLOWING: OTHER (SEE COMMENTS)
NUMBER_OF_TIMES_PATIENT_HAS_FALLEN_WITHIN_LAST_SIX_MONTHS: 0
ADLS_ACUITY_SCORE: 15
ADLS_ACUITY_SCORE: 15
EQUIPMENT_CURRENTLY_USED_AT_HOME: OTHER (SEE COMMENTS)
ADLS_ACUITY_SCORE: 20
DIFFICULTY_COMMUNICATING: OTHER (SEE COMMENTS)
ADLS_ACUITY_SCORE: 15
ADLS_ACUITY_SCORE: 15
WALKING_OR_CLIMBING_STAIRS_DIFFICULTY: OTHER (SEE COMMENTS)
COMMUNICATION: OTHER (SEE COMMENTS)
ADLS_ACUITY_SCORE: 20
ADLS_ACUITY_SCORE: 12
DRESSING/BATHING_DIFFICULTY: OTHER (SEE COMMENTS)
ADLS_ACUITY_SCORE: 16
TOILETING_ISSUES: OTHER (SEE COMMENTS)
ADLS_ACUITY_SCORE: 15
ADLS_ACUITY_SCORE: 16
ADLS_ACUITY_SCORE: 16
ADLS_ACUITY_SCORE: 15
ADLS_ACUITY_SCORE: 20
WEAR_GLASSES_OR_BLIND: OTHER (SEE COMMENTS)
ADLS_ACUITY_SCORE: 15
EQUIPMENT_CURRENTLY_USED_AT_HOME: OTHER (SEE COMMENTS)
ADLS_ACUITY_SCORE: 16
CONCENTRATING,_REMEMBERING_OR_MAKING_DECISIONS_DIFFICULTY: OTHER (SEE COMMENTS)
ADLS_ACUITY_SCORE: 17
DOING_ERRANDS_INDEPENDENTLY_DIFFICULTY: OTHER (SEE COMMENTS)
ADLS_ACUITY_SCORE: 15
ADLS_ACUITY_SCORE: 17
ADLS_ACUITY_SCORE: 12
ADLS_ACUITY_SCORE: 16
ADLS_ACUITY_SCORE: 16
FALL_HISTORY_WITHIN_LAST_SIX_MONTHS: NO

## 2022-01-27 NOTE — PROGRESS NOTES
Unable to find vein suitable for PIV insertion, ultrasound was used. RN aware.  Currently, Pt has two PIVs in right arm.

## 2022-01-27 NOTE — ED NOTES
1 unit PRBCs running on transfer to [facility]. This will be the 3rd unit given. Paper charting started due to emergent need.
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
## 2022-01-27 NOTE — ED PROVIDER NOTES
ED signout note.  I received signout of this patient as she is pending transfer within approximately hour of time of signout.  Patient with GI bleed, septic shock, history of alcohol abuse.  Repeat labs were pending at time of signout.    I was called and notified that repeat hemoglobin is low at 6.7.  I verified with nursing staff, and evaluated the patient, and patient has not had any further episodes of vomiting, or stooling.  Recheck of lab is ordered.  Also did have low potassium, and low calcium.  Uncertain exact significance of this as patient has only received crystalloid, and blood products.  Will order redraw.    However, EMS arrived at 12:45 AM shortly after recheck of hemoglobin.  Given the low values, I will transfuse 1 unit of O- emergency release blood so that EMS can administer this during transport.    Additional IV line will be inserted, and recheck of labs are ordered.    MD Bob Don David James, MD  01/27/22 0047    Recheck of hgb came back at 10.4.  Since ambulance had already been here to transfer the patient and concern was  for severe anemia, decision had been made for emergency release blood.     Bong Rojas MD  01/27/22 0116

## 2022-01-27 NOTE — ED NOTES
Discussed lab draw (of labs due at 2300) with provider. OK to draw after 2nd PRBC infusion complete VORB Dr. Alberts.

## 2022-01-27 NOTE — PLAN OF CARE
Admitted/transferred from:[facility] Emergency Dept   Reason for admission/transfer: GI bleed  Patient status upon admission/transfer: semicomatose, moves all extremities, does not open eyes to name/pain, 1 tarry black/green stool.   Interventions: LR bolus (1000ml),  ml/hr, Labs sent, CT  Plan: Reassess neuro status/monitor for ETOH w/d, monitor for bleeding.    2 RN skin assessment: completed by Gwendolyn WASHINGTON   Result of skin assessment and interventions/actions: L. Flank bruising, mottled knees/feet   Height, weight, drug calc weight: Done  Patient belongings (see Flowsheet - Adult Profile for details): In room with patient, clothes.   MDRO education (if applicable): Does not meet criteria

## 2022-01-27 NOTE — PROGRESS NOTES
MICU PROGRESS NOTE   Mihaela Vance ([id number]) admitted on (Not on file)  Primary care provider: Kylah Sánchez        ASSESSMENT & PLAN :  61 year old female h/o alcohol use disorder and hepatic steatosis, admitted for hypotension following several days of melena and coffee ground emesis, found to have leukocytosis with neutrophilic predominance, TIFFANY, myoglobinuria, and lactic acidosis that responded to IV fluids. Highest suspicion is for alcohol use limiting other PO intake resulting in gastritis, hypovolemia, and concern for rhabdomyolysis.    Changes and Major Things Today:  - EGD  - hold benzos given encephalopathy  - recheck hgb q 6  - Precedex if needed for withdrawal      NEURO  # Alcohol Use Disorder  # Alcohol Withdrawal  Received 7mg benzodiazepine in outside ED, outside of pathway for benzo sparing protocol  - Hold benzos per [facility] protocol for high risk patients due to encephalopathy and concern for airway protection  - folate  - thiamine  - when patient able to converse coherently, would consult addiction services  - Continue catapres 0.1mg patch   - Consider precedex if agitation related to withdrawal as first line, consider add benzos pending mentation.     #Acute Toxic & Metabolic Encephalopathy  Likely multifactorial including alcohol withdrawal, benzodiazepines (given at ED), and acute kidney injury. With unwitnessed falls and bruising on back concern for possible head trauma.  - Head CT negative     #Generalized Anxiety Disorder  # Panic Disorder  - Hold pta buspirone  - Hold pta hydroxyzine    #Chronic Pain Syndrome  - hold pta diclofenac gel while in ICU (topicals have high risk)  - hold pta cyclobenzaprine in setting of critical illness  - replace pta vicodin with oxycodone (reduce APAP combo drug in fatty liver)    #Thamine deficiency neuropathy  - hold B complex vitamins (thiamine replacement as above)    CARDIOVASCULAR  # Hypotension, resolved. secondary to hypovolemia,  dehydration. Now normotensive s/p 3L fluid replacement.     #Essential Hypertension  - hold pta amlodipine, atenolol    # Elevated Troponins with T wave inversions noted. Suspect demand ischemia in setting of hypotension/anemia/bleeding  - Trend troponins q 6H to peak     PULM  No acute concerns at this time.    GI  #Severe esophagitis LA grade D (Melena, Coffee Ground Emesis)  - GI consulted, severe esophagitis without evidence of active bleed/fresh blood  - 2 large bore IV access  - protonix IV BID  -- strict avoidance of EtOH, tobacco, NSAIDs  -- lifelong antirefllux lifestyle measures  -- add on therapy to consider:               -- daily to twice a day H2RA for reflux management                -- BID sucralfate slurry (will need to space out by 1-2 hours from other  important PO medications as this can impact absorption)  -- will eventually require outpatient EGD to follow up healing and assess for underlying Monahan's Esophagus, this can be done on elective basis many months from discharge, TBD by outpatient providers      # Hepatic steatosis 2/2 alcohol use  Increased hepatic echogenecity seen on US in 2015, no abdominal imaging since that time. Hepatitides screened for in the past normal.   - Follow LFTs    #GERD  - hold pta mylanta & tums  - hold pta omeprazole while on protonix (above)    # Nutrition: NPO awaiting possible GI procedure, encephalopathy     RENAL  # Acute Kidney Injury on CKD Stage 3, prerenal + heme-pigment induced  Unclear rate of rise with last Cr >3months ago. Prerenal most likely etiology with hyaline casts, GI losses, and likely poor PO intake. Intrarenal concomitant cause of heme injury likely with myoglobinuria. Proteinuria most likely due to myoglobinuria. Obstructive unlikely but not fully evaluated.  - will defer urinary catheter at this time due to improvement in BP and lactic acidosis with fluid resuscitation as well as producing urine currently  - defer decision regarding Renal  US until Cr trend determined as prerenal etiology appears so likely at this time  - plan to repeat UA in coming days/weeks to ensure proteinuria resolves    #Myoglobinuria, concerning for rhabdomyolysis  Hemolysis possible, testing complicated by blood transfusion at outside hospital.   - Ck normal    #Lactic acidosis, mild    #Chronic Hyponatremia, mild  Unclear etiology, most likely beer potomania due to chronic alcohol use  - defer urinary studies at this time due to acute illness and fluid resuscitation  - no Na correction needed at this time    # Fluids   - Continue 100ml/h LR due to suspected hypovolemia while NPO     #Hypercalcemia, resolved  - Trend BMP     # Electrolytes  - on K, Mg, Phos replacement protocols  - High risk for refeeding syndrome once taking PO    HEME/ONC  # Leukocytosis with neutrophilic predominance, resolved likely hemoconcentration  Due to improvement with fluid resuscitation most likely hemoconcentration. Considered bone marrow activation in setting of slow bleed vs. Infection, see discussion regarding possible infection below.    # Acute on Chronic Normocytic Anemia  Likely falsely at baseline due to hemoconcentration (last Hgb 9/7/21 1.5g higher), on recheck was <7.  - Bleeding likely from severe esophagitis   - hold pta iron supplementation in setting of melena and coffee ground emesis and concern for infection    # Transfusion Hx:   3u pRBC  1/26/22    ENDOCRINE  No acute concerns at this time  -Glycemic control for BG <180    INFECTIOUS DISEASE  # Sepsis, unknown infectious source  Possible that infection is etiology of hypotension and presentation rather than bleeding, particularly with leukocytosis.  Source unclear - with emesis concern for gastroenteritis, biliary etiology unlikely with normal bili, hepatitis unlikely with normal ALT/AST, pulmonary possible particularly aspiration pneumonitis or pneumonia in setting of emesis, urinary unlikely with no pyuria, meningitis  possible in setting of encephalopathy but without nuchal rigidity and explanation of alcohol withdrawal less likely at this time.  - blood culture pending  - hold further antibiotics awaiting work-up    # Antimicrobials  Zosyn 1/26/22  Vancomycin 1/26/22    SKIN/MSK  # Bruising on back  Suspect these are result of falls that have been unwitnessed, CXR without evidence of rib fractures. Due to her inability to corroborate history, compelled to consider possibility of injury incurred through violence.  - day team to consider SW consult for evaluation of home safety    Prophylaxis:  DVT: mechanical   GI: IV PPI  Family:Will update S.O. Jeremy per phone  Disposition: ICU,pending improved mental status   Code Status: FULL CODE    Patient was seen and discussed with attending physician , who agrees with above assessment and plan.      ROS:   Unable to review with patient due to encephalopathy.    Physical exam:  Temp:  [97  F (36.1  C)-98.2  F (36.8  C)] 98.2  F (36.8  C)  Pulse:  [] 94  Resp:  [10-56] 15  BP: ()/() 129/88  SpO2:  [75 %-100 %] 95 %  Wt Readings from Last 3 Encounters:   01/27/22 59.2 kg (130 lb 8.2 oz)   01/26/22 59.8 kg (131 lb 12.8 oz)   09/07/21 57.2 kg (126 lb)     General: In bed, moaning in mild distress, without response to commands  HEENT: PERRLA, no scleral icterus or injection, (reportedly very hard of hearing)  CARDIAC: S1/S2 heard, no murmurs appreciated. No peripheral edema  RESP: normal breath sounds, no wheezes or crackles appreciated.  GI: NT/ND, no guarding or rebound, bowel sounds active  : salguero in place, light yellow urine  MSK/INTEGUMENT: linear bruising on R flank, appear to be healing, decreased muscle bulk on extremities  NEURO: Not responding to commands, intermittently moaning, withdraws from pain, independently moving arms     Labs:  CBC  Recent Labs   Lab 01/27/22  0431 01/27/22  0042 01/26/22  2351 01/26/22  1852   WBC 15.1*  --  9.3 16.5*   RBC 3.87   --  2.07* 2.81*   HGB 12.1 10.4* 6.7* 9.2*   HCT 35.6  --  20.4* 27.8*   MCV 92  --  99 99   MCH 31.3  --  32.4 32.7   MCHC 34.0  --  32.8 33.1   RDW 13.9  --  13.3 13.6     --  114* 303     INR  Recent Labs   Lab 01/26/22 1852   INR 1.28*       BMP  Recent Labs   Lab 01/26/22 1852   *   POTASSIUM 3.5   CHLORIDE 80*   CO2 27   ANIONGAP 21*   *   *   CR 2.01* (1.02 9/21)   GFRESTIMATED 27*   SUE 11.6*     Liver panel  Recent Labs   Lab 01/26/22 1852   PROTTOTAL 7.3   ALBUMIN 3.3*   BILITOTAL 0.9   ALKPHOS 97   AST 31   ALT 24      Urinalysis notable for ketones, Sp garvity 1.016, Protein 30, moderate blood without RBC seen, hyaline casts & mucus present.    Imaging/Procedure results:  Recent Results (from the past 24 hour(s))   XR Chest Port 1 View    Narrative    EXAM: CHEST SINGLE VIEW PORTABLE  LOCATION: [facility]  DATE/TIME: 1/27/2022 12:05 AM    INDICATION: Sepsis. Unknown source.  COMPARISON: 10/30/2014.    FINDINGS: Hypoinflated lungs with secondary crowding of the pulmonary vasculature. No convincing pulmonary opacities. The size of the cardiac silhouette is within normal limits. Moderate size hiatal hernia. Healing fractures are noted in the lateral   aspects of a few mid to lower left ribs.      Impression    IMPRESSION: No convincing evidence of active cardiopulmonary disease.    XR Chest Port 1 View    Impression    RESIDENT PRELIMINARY INTERPRETATION  Impression: Increased left basilar and retrocardiac streaky  atelectasis, however cannot exclude superimposed infection or  pulmonary edema.   CT Head w/o Contrast    Narrative    CT HEAD W/O CONTRAST 1/27/2022 5:18 AM    History: Mental status change, unknown cause     Comparison: 4/22/2015 brain MR    Technique: Using multidetector thin collimation helical acquisition  technique, axial, coronal and sagittal CT images from the skull base  to the vertex were obtained without intravenous contrast.    (topogram) image(s) also obtained and reviewed.    Findings: There is no intracranial hemorrhage, mass effect, or midline  shift. No extra-axial fluid collection. Gray/white matter  differentiation in both cerebral hemispheres is preserved. Patchy  periventricular and subcortical white matter hypodensities,  nonspecific but likely sequela of chronic small vessel ischemic  disease. Ventricles are proportionate to the cerebral sulci. Mild  diffuse cerebral volume loss. The basal cisterns are clear.    The bony calvaria and the bones of the skull base are normal. The  visualized portions of the paranasal sinuses and mastoid air cells are  clear.      Impression    Impression:  1. No acute intracranial pathology.   2. Mild generalized cerebral volume loss, slightly greater than  expected for age.  3. Leukoaraiosis.    I have personally reviewed the examination and initial interpretation  and I agree with the findings.    SAMIR AMBROSE MD         SYSTEM ID:  [id number]

## 2022-01-27 NOTE — PHARMACY-VANCOMYCIN DOSING SERVICE
"Pharmacy Vancomycin Initial Note  Date of Service 2022  Patient's  1960  61 year old, female    Indication: Sepsis    Current estimated CrCl = Estimated Creatinine Clearance: 27.4 mL/min (A) (based on SCr of 2.01 mg/dL (H)).    Creatinine for last 3 days  2022:  6:52 PM Creatinine 2.01 mg/dL    Recent Vancomycin Level(s) for last 3 days  No results found for requested labs within last 72 hours.      Vancomycin IV Administrations (past 72 hours)      No vancomycin orders with administrations in past 72 hours.                Nephrotoxins and other renal medications (From now, onward)    Start     Dose/Rate Route Frequency Ordered Stop    22  vancomycin (VANCOCIN) 750 mg in sodium chloride 0.9 % 250 mL intermittent infusion        \"Followed by\" Linked Group Details    750 mg  over 60-90 Minutes Intravenous EVERY 24 HOURS 22  vancomycin (VANCOCIN) 1,250 mg in sodium chloride 0.9 % 250 mL intermittent infusion        \"Followed by\" Linked Group Details    1,250 mg  over 90 Minutes Intravenous ONCE 22  norepinephrine (LEVOPHED) 4 mg in  mL PERIPHERAL infusion         0.03-0.125 mcg/kg/min × 59.8 kg  6.7-28 mL/hr  Intravenous CONTINUOUS 22  piperacillin-tazobactam (ZOSYN) infusion 3.375 g        Note to Pharmacy: For [facility], [facility] and [facility]: For Zosyn-naive patients, use the \"Zosyn initial dose + extended infusion\" order panel.  Dose adjusted for est CrCl of 27.4 ml/min    3.375 g  100 mL/hr over 30 Minutes Intravenous ONCE 22            Contrast Orders - past 72 hours (72h ago, onward)            None          [employer] Prediction of Planned Initial Vancomycin Regimen  Loading dose: 1250 mg at 21:00 2022.  Regimen: 750 mg IV every 24 hours.  Start time: 20:39 on 2022  Exposure target: AUC24 (range)400-600 mg/L.hr   AUC24,ss: 518 mg/L.hr  Probability of AUC24 > 400: 78 " %  Ctrough,ss: 17.3 mg/L  Probability of Ctrough,ss > 20: 35 %  Probability of nephrotoxicity (Lodise BRITTANY 2009): 13 %        Plan:  1. Start vancomycin  1250 mg (21 mg/kg) IV x 1 dose followed by 750 mg IV q24h.   2. Vancomycin monitoring method: AUC  3. Vancomycin therapeutic monitoring goal: 400-600 mg*h/L  4. Pharmacy will check vancomycin levels as appropriate in 1-3 Days.    5. Serum creatinine levels will be ordered daily for the first week of therapy and at least twice weekly for subsequent weeks.      Jennifer Flores, PharmD
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
## 2022-01-27 NOTE — BRIEF OP NOTE
[facility]    Brief Operative Note: Endoscopy (MICU)    Pre-operative diagnosis: GIB (gastrointestinal bleeding) [K92.2]  Post-operative diagnosis Same as pre-operative diagnosis, severe erosive LA Grade D esophagitis     Procedure: Procedure(s):  ESOPHAGOGASTRODUODENOSCOPY (EGD)  Surgeon: Surgeon(s) and Role:     * Kofi Chan,  - Primary  Anesthesia: Conscious Sedation   Estimated Blood Loss: None    Drains: None  Specimens: None  Complications: None  Implants:  None    Findings:  -- severe, LA grade D, circumferential erosive esophagitis, approximately 10 cm in length, friable surface, this was almost certainly the cause of her hematemesis   -- large hiatal hernia (8 cm), hernia sac and fundus with medium pool of hematin containing liquid, no fresh blood seen, fully evacuated fluid pool   -- hematin products in stomach, no active bleeding identified  -- small bowel through to 2nd portion of duodenum without evidence of active or recent bleeding  -- overall, no actively bleeding lesion identified on this exam, and as such no interventions employed    Outgoing Recommendations:  -- high dose PPI BID (pantoprazole 40 mg) IV until tolerating PO, indefinitely   -- strict avoidance of EtOH, tobacco, NSAIDs  -- lifelong antirefllux lifestyle measures  -- add on therapy to consider:   -- daily to twice a day H2RA for reflux management    -- BID sucralfate slurry (will need to space out by 1-2 hours from other  important PO medications  as this can impact absorption)  -- will eventually require outpatient EGD to follow up healing and assess for underlying Monahan's Esophagus, this can be done on elective basis many months from discharge, TBD by outpatient providers  -- inpatient luminal GI will sign off at this time, reach out with questions or concerns should they arise    William Godwin MD, PGY4  Gastroenterology Fellow  [facility]  See AMCOM/Qgenda for GI  on-call information    Dr. Chan, GI staff, present for entire procedure.     Agree with the above. Please see endoscopy report for full description of the procedure.     Kofi Chan DO   of Medicine  Program Head, Esophageal Disorders Program  Division of Gastroenterology, Hepatology, and Nutrition  [facility]

## 2022-01-27 NOTE — ED TRIAGE NOTES
Pt arrives via EMS d/t coffee ground emesis and dark tarry stools since Wednesday. When EMS arrived pt was hypotensive, tachycardic, pale and lethargic. Pt arrives on a norepinephrine.
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
## 2022-01-27 NOTE — PLAN OF CARE
Problem: Restraint for Non-Violent/Non-Self-Destructive Behavior  Goal: Prevent/Manage Potential Problems  Description: Maintain safety of patient and others during period of restraint.  Promote psychological and physical wellbeing.  Prevent injury to skin and involved body parts.  Promote nutrition, hydration, and elimination.  Outcome: No Change     Added R. Wrist restraint d/t R.PIV occluding, Pt unable to follow commands to keep arm straight.

## 2022-01-27 NOTE — PLAN OF CARE
ICU End of Shift Summary. See flowsheets for vital signs and detailed assessment.    Changes this shift: Phosphorous 1.9, potassium phosphate 9mmol/250  replacement initiated. Head CT negative. Neuro status unchanged. BP improved after applying clonidine patch.     Plan: Monitor neuro status, electrolytes.

## 2022-01-28 LAB
ALBUMIN SERPL-MCNC: 2.4 G/DL (ref 3.4–5)
ALP SERPL-CCNC: 70 U/L (ref 40–150)
ALT SERPL W P-5'-P-CCNC: 52 U/L (ref 0–50)
ANION GAP SERPL CALCULATED.3IONS-SCNC: 5 MMOL/L (ref 3–14)
ANION GAP SERPL CALCULATED.3IONS-SCNC: 7 MMOL/L (ref 3–14)
AST SERPL W P-5'-P-CCNC: 94 U/L (ref 0–45)
ATRIAL RATE - MUSE: 96 BPM
BILIRUB DIRECT SERPL-MCNC: 0.3 MG/DL (ref 0–0.2)
BILIRUB SERPL-MCNC: 1 MG/DL (ref 0.2–1.3)
BUN SERPL-MCNC: 42 MG/DL (ref 7–30)
BUN SERPL-MCNC: 42 MG/DL (ref 7–30)
CA-I BLD-MCNC: 4.5 MG/DL (ref 4.4–5.2)
CALCIUM SERPL-MCNC: 8.3 MG/DL (ref 8.5–10.1)
CALCIUM SERPL-MCNC: 8.6 MG/DL (ref 8.5–10.1)
CHLORIDE BLD-SCNC: 100 MMOL/L (ref 94–109)
CHLORIDE BLD-SCNC: 99 MMOL/L (ref 94–109)
CO2 SERPL-SCNC: 28 MMOL/L (ref 20–32)
CO2 SERPL-SCNC: 29 MMOL/L (ref 20–32)
CREAT SERPL-MCNC: 1.13 MG/DL (ref 0.52–1.04)
CREAT SERPL-MCNC: 1.16 MG/DL (ref 0.52–1.04)
DIASTOLIC BLOOD PRESSURE - MUSE: NORMAL MMHG
ERYTHROCYTE [DISTWIDTH] IN BLOOD BY AUTOMATED COUNT: 15.3 % (ref 10–15)
GFR SERPL CREATININE-BSD FRML MDRD: 53 ML/MIN/1.73M2
GFR SERPL CREATININE-BSD FRML MDRD: 55 ML/MIN/1.73M2
GLUCOSE BLD-MCNC: 117 MG/DL (ref 70–99)
GLUCOSE BLD-MCNC: 119 MG/DL (ref 70–99)
GLUCOSE BLDC GLUCOMTR-MCNC: 116 MG/DL (ref 70–99)
HCT VFR BLD AUTO: 31.8 % (ref 35–47)
HGB BLD-MCNC: 11.1 G/DL (ref 11.7–15.7)
HOLD SPECIMEN: NORMAL
INR PPP: 1.14 (ref 0.85–1.15)
INTERPRETATION ECG - MUSE: NORMAL
LACTATE SERPL-SCNC: 0.7 MMOL/L (ref 0.7–2)
MAGNESIUM SERPL-MCNC: 1.8 MG/DL (ref 1.6–2.3)
MCH RBC QN AUTO: 32 PG (ref 26.5–33)
MCHC RBC AUTO-ENTMCNC: 34.9 G/DL (ref 31.5–36.5)
MCV RBC AUTO: 92 FL (ref 78–100)
P AXIS - MUSE: 25 DEGREES
PHOSPHATE SERPL-MCNC: 1.6 MG/DL (ref 2.5–4.5)
PLATELET # BLD AUTO: 149 10E3/UL (ref 150–450)
POTASSIUM BLD-SCNC: 3.4 MMOL/L (ref 3.4–5.3)
POTASSIUM BLD-SCNC: 3.4 MMOL/L (ref 3.4–5.3)
PR INTERVAL - MUSE: 142 MS
PROT SERPL-MCNC: 5.7 G/DL (ref 6.8–8.8)
QRS DURATION - MUSE: 78 MS
QT - MUSE: 370 MS
QTC - MUSE: 467 MS
R AXIS - MUSE: 66 DEGREES
RBC # BLD AUTO: 3.47 10E6/UL (ref 3.8–5.2)
SODIUM SERPL-SCNC: 134 MMOL/L (ref 133–144)
SODIUM SERPL-SCNC: 134 MMOL/L (ref 133–144)
SYSTOLIC BLOOD PRESSURE - MUSE: NORMAL MMHG
T AXIS - MUSE: 0 DEGREES
VENTRICULAR RATE- MUSE: 96 BPM
VIT B12 SERPL-MCNC: 500 PG/ML (ref 193–986)
WBC # BLD AUTO: 9.3 10E3/UL (ref 4–11)

## 2022-01-28 PROCEDURE — 250N000011 HC RX IP 250 OP 636: Performed by: STUDENT IN AN ORGANIZED HEALTH CARE EDUCATION/TRAINING PROGRAM

## 2022-01-28 PROCEDURE — 83605 ASSAY OF LACTIC ACID: CPT | Performed by: NURSE PRACTITIONER

## 2022-01-28 PROCEDURE — 250N000013 HC RX MED GY IP 250 OP 250 PS 637: Performed by: NURSE PRACTITIONER

## 2022-01-28 PROCEDURE — 36415 COLL VENOUS BLD VENIPUNCTURE: CPT | Performed by: INTERNAL MEDICINE

## 2022-01-28 PROCEDURE — 120N000002 HC R&B MED SURG/OB UMMC

## 2022-01-28 PROCEDURE — C9113 INJ PANTOPRAZOLE SODIUM, VIA: HCPCS | Performed by: NURSE PRACTITIONER

## 2022-01-28 PROCEDURE — 99233 SBSQ HOSP IP/OBS HIGH 50: CPT | Performed by: NURSE PRACTITIONER

## 2022-01-28 PROCEDURE — 82607 VITAMIN B-12: CPT

## 2022-01-28 PROCEDURE — 85610 PROTHROMBIN TIME: CPT | Performed by: STUDENT IN AN ORGANIZED HEALTH CARE EDUCATION/TRAINING PROGRAM

## 2022-01-28 PROCEDURE — 85027 COMPLETE CBC AUTOMATED: CPT | Performed by: STUDENT IN AN ORGANIZED HEALTH CARE EDUCATION/TRAINING PROGRAM

## 2022-01-28 PROCEDURE — 250N000011 HC RX IP 250 OP 636: Performed by: INTERNAL MEDICINE

## 2022-01-28 PROCEDURE — 250N000013 HC RX MED GY IP 250 OP 250 PS 637: Performed by: STUDENT IN AN ORGANIZED HEALTH CARE EDUCATION/TRAINING PROGRAM

## 2022-01-28 PROCEDURE — 258N000003 HC RX IP 258 OP 636: Performed by: STUDENT IN AN ORGANIZED HEALTH CARE EDUCATION/TRAINING PROGRAM

## 2022-01-28 PROCEDURE — 82248 BILIRUBIN DIRECT: CPT | Performed by: STUDENT IN AN ORGANIZED HEALTH CARE EDUCATION/TRAINING PROGRAM

## 2022-01-28 PROCEDURE — 36415 COLL VENOUS BLD VENIPUNCTURE: CPT

## 2022-01-28 PROCEDURE — 82330 ASSAY OF CALCIUM: CPT

## 2022-01-28 PROCEDURE — 83735 ASSAY OF MAGNESIUM: CPT | Performed by: NURSE PRACTITIONER

## 2022-01-28 PROCEDURE — 84100 ASSAY OF PHOSPHORUS: CPT | Performed by: NURSE PRACTITIONER

## 2022-01-28 PROCEDURE — 80053 COMPREHEN METABOLIC PANEL: CPT | Performed by: INTERNAL MEDICINE

## 2022-01-28 PROCEDURE — 250N000009 HC RX 250: Performed by: NURSE PRACTITIONER

## 2022-01-28 PROCEDURE — 250N000011 HC RX IP 250 OP 636: Performed by: NURSE PRACTITIONER

## 2022-01-28 RX ORDER — FERROUS SULFATE 325(65) MG
325 TABLET ORAL DAILY
Status: DISCONTINUED | OUTPATIENT
Start: 2022-01-29 | End: 2022-02-02 | Stop reason: HOSPADM

## 2022-01-28 RX ORDER — FLUMAZENIL 0.1 MG/ML
0.2 INJECTION, SOLUTION INTRAVENOUS
Status: DISCONTINUED | OUTPATIENT
Start: 2022-01-28 | End: 2022-02-02 | Stop reason: HOSPADM

## 2022-01-28 RX ORDER — HYDROXYZINE HYDROCHLORIDE 25 MG/1
50 TABLET, FILM COATED ORAL EVERY 8 HOURS PRN
Status: DISCONTINUED | OUTPATIENT
Start: 2022-01-28 | End: 2022-01-30

## 2022-01-28 RX ORDER — GABAPENTIN 600 MG/1
1200 TABLET ORAL ONCE
Status: DISCONTINUED | OUTPATIENT
Start: 2022-01-28 | End: 2022-01-28

## 2022-01-28 RX ORDER — ACETAMINOPHEN 325 MG/1
650 TABLET ORAL EVERY 4 HOURS PRN
Status: DISCONTINUED | OUTPATIENT
Start: 2022-01-28 | End: 2022-02-02 | Stop reason: HOSPADM

## 2022-01-28 RX ORDER — GABAPENTIN 100 MG/1
100 CAPSULE ORAL EVERY 8 HOURS
Status: DISCONTINUED | OUTPATIENT
Start: 2022-02-04 | End: 2022-01-28

## 2022-01-28 RX ORDER — GABAPENTIN 300 MG/1
600 CAPSULE ORAL EVERY 8 HOURS
Status: COMPLETED | OUTPATIENT
Start: 2022-01-28 | End: 2022-01-30

## 2022-01-28 RX ORDER — ATENOLOL 50 MG/1
50 TABLET ORAL DAILY
Status: DISCONTINUED | OUTPATIENT
Start: 2022-01-28 | End: 2022-02-02 | Stop reason: HOSPADM

## 2022-01-28 RX ORDER — POTASSIUM CHLORIDE 7.45 MG/ML
10 INJECTION INTRAVENOUS
Status: COMPLETED | OUTPATIENT
Start: 2022-01-28 | End: 2022-01-28

## 2022-01-28 RX ORDER — METOPROLOL TARTRATE 1 MG/ML
5 INJECTION, SOLUTION INTRAVENOUS EVERY 6 HOURS PRN
Status: DISCONTINUED | OUTPATIENT
Start: 2022-01-28 | End: 2022-02-02 | Stop reason: HOSPADM

## 2022-01-28 RX ORDER — PANTOPRAZOLE SODIUM 40 MG/1
40 TABLET, DELAYED RELEASE ORAL
Status: DISCONTINUED | OUTPATIENT
Start: 2022-01-28 | End: 2022-02-02 | Stop reason: HOSPADM

## 2022-01-28 RX ORDER — AMLODIPINE BESYLATE 2.5 MG/1
2.5 TABLET ORAL DAILY
Status: DISCONTINUED | OUTPATIENT
Start: 2022-01-28 | End: 2022-02-02 | Stop reason: HOSPADM

## 2022-01-28 RX ORDER — SUCRALFATE ORAL 1 G/10ML
1 SUSPENSION ORAL
Status: DISCONTINUED | OUTPATIENT
Start: 2022-01-28 | End: 2022-02-02 | Stop reason: HOSPADM

## 2022-01-28 RX ORDER — HYDROXYZINE HYDROCHLORIDE 25 MG/1
25 TABLET, FILM COATED ORAL EVERY 8 HOURS PRN
Status: DISCONTINUED | OUTPATIENT
Start: 2022-01-28 | End: 2022-01-30

## 2022-01-28 RX ORDER — FAMOTIDINE 20 MG/1
20 TABLET, FILM COATED ORAL DAILY
Status: DISCONTINUED | OUTPATIENT
Start: 2022-01-28 | End: 2022-02-02 | Stop reason: HOSPADM

## 2022-01-28 RX ORDER — DIAZEPAM 5 MG
10 TABLET ORAL EVERY 30 MIN PRN
Status: DISCONTINUED | OUTPATIENT
Start: 2022-01-28 | End: 2022-02-02 | Stop reason: HOSPADM

## 2022-01-28 RX ORDER — GABAPENTIN 300 MG/1
300 CAPSULE ORAL EVERY 8 HOURS
Status: DISCONTINUED | OUTPATIENT
Start: 2022-02-02 | End: 2022-01-28

## 2022-01-28 RX ORDER — OLANZAPINE 10 MG/2ML
10 INJECTION, POWDER, FOR SOLUTION INTRAMUSCULAR ONCE
Status: ACTIVE | OUTPATIENT
Start: 2022-01-28

## 2022-01-28 RX ORDER — GABAPENTIN 300 MG/1
300 CAPSULE ORAL EVERY 8 HOURS
Status: DISCONTINUED | OUTPATIENT
Start: 2022-01-30 | End: 2022-01-31

## 2022-01-28 RX ORDER — MAGNESIUM OXIDE 400 MG/1
400 TABLET ORAL 2 TIMES DAILY
Status: COMPLETED | OUTPATIENT
Start: 2022-01-28 | End: 2022-01-29

## 2022-01-28 RX ORDER — GABAPENTIN 100 MG/1
100 CAPSULE ORAL EVERY 8 HOURS
Status: DISCONTINUED | OUTPATIENT
Start: 2022-02-01 | End: 2022-01-31

## 2022-01-28 RX ORDER — HALOPERIDOL 5 MG/ML
2.5-5 INJECTION INTRAMUSCULAR EVERY 4 HOURS PRN
Status: DISCONTINUED | OUTPATIENT
Start: 2022-01-28 | End: 2022-02-02 | Stop reason: HOSPADM

## 2022-01-28 RX ORDER — GABAPENTIN 300 MG/1
900 CAPSULE ORAL EVERY 8 HOURS
Status: DISCONTINUED | OUTPATIENT
Start: 2022-01-28 | End: 2022-01-28

## 2022-01-28 RX ORDER — GABAPENTIN 300 MG/1
600 CAPSULE ORAL EVERY 8 HOURS
Status: DISCONTINUED | OUTPATIENT
Start: 2022-01-31 | End: 2022-01-28

## 2022-01-28 RX ADMIN — PROCHLORPERAZINE EDISYLATE 10 MG: 5 INJECTION INTRAMUSCULAR; INTRAVENOUS at 00:24

## 2022-01-28 RX ADMIN — POTASSIUM & SODIUM PHOSPHATES POWDER PACK 280-160-250 MG 2 PACKET: 280-160-250 PACK at 20:04

## 2022-01-28 RX ADMIN — Medication 5 MG: at 20:07

## 2022-01-28 RX ADMIN — Medication 1 TABLET: at 08:11

## 2022-01-28 RX ADMIN — PANTOPRAZOLE SODIUM 40 MG: 40 INJECTION, POWDER, FOR SOLUTION INTRAVENOUS at 08:11

## 2022-01-28 RX ADMIN — ATENOLOL 50 MG: 50 TABLET ORAL at 11:09

## 2022-01-28 RX ADMIN — FAMOTIDINE 20 MG: 10 INJECTION, SOLUTION INTRAVENOUS at 11:10

## 2022-01-28 RX ADMIN — AMLODIPINE BESYLATE 2.5 MG: 2.5 TABLET ORAL at 09:36

## 2022-01-28 RX ADMIN — FOLIC ACID 1 MG: 5 INJECTION, SOLUTION INTRAMUSCULAR; INTRAVENOUS; SUBCUTANEOUS at 09:35

## 2022-01-28 RX ADMIN — THIAMINE HYDROCHLORIDE 200 MG: 100 INJECTION, SOLUTION INTRAMUSCULAR; INTRAVENOUS at 09:36

## 2022-01-28 RX ADMIN — GABAPENTIN 600 MG: 300 CAPSULE ORAL at 12:02

## 2022-01-28 RX ADMIN — THIAMINE HYDROCHLORIDE 200 MG: 100 INJECTION, SOLUTION INTRAMUSCULAR; INTRAVENOUS at 20:11

## 2022-01-28 RX ADMIN — POTASSIUM CHLORIDE 10 MEQ: 7.46 INJECTION, SOLUTION INTRAVENOUS at 06:56

## 2022-01-28 RX ADMIN — BUSPIRONE HYDROCHLORIDE 30 MG: 30 TABLET ORAL at 20:07

## 2022-01-28 RX ADMIN — Medication 400 MG: at 20:06

## 2022-01-28 RX ADMIN — PANTOPRAZOLE SODIUM 40 MG: 40 TABLET, DELAYED RELEASE ORAL at 18:14

## 2022-01-28 RX ADMIN — POTASSIUM CHLORIDE 10 MEQ: 7.46 INJECTION, SOLUTION INTRAVENOUS at 05:57

## 2022-01-28 RX ADMIN — Medication 400 MG: at 09:36

## 2022-01-28 RX ADMIN — SUCRALFATE 1 G: 1 SUSPENSION ORAL at 16:00

## 2022-01-28 RX ADMIN — POTASSIUM & SODIUM PHOSPHATES POWDER PACK 280-160-250 MG 2 PACKET: 280-160-250 PACK at 09:36

## 2022-01-28 RX ADMIN — THIAMINE HYDROCHLORIDE 200 MG: 100 INJECTION, SOLUTION INTRAMUSCULAR; INTRAVENOUS at 14:36

## 2022-01-28 RX ADMIN — FAMOTIDINE 20 MG: 10 TABLET, FILM COATED ORAL at 14:36

## 2022-01-28 RX ADMIN — POTASSIUM & SODIUM PHOSPHATES POWDER PACK 280-160-250 MG 2 PACKET: 280-160-250 PACK at 14:36

## 2022-01-28 RX ADMIN — GABAPENTIN 600 MG: 300 CAPSULE ORAL at 20:06

## 2022-01-28 ASSESSMENT — ACTIVITIES OF DAILY LIVING (ADL)
HEARING_DIFFICULTY_OR_DEAF: YES
ADLS_ACUITY_SCORE: 19
ADLS_ACUITY_SCORE: 17
WERE_AUXILIARY_AIDS_OFFERED?: NO
ADLS_ACUITY_SCORE: 17
DESCRIBE_HEARING_LOSS: BILATERAL HEARING LOSS
ADLS_ACUITY_SCORE: 17
ADLS_ACUITY_SCORE: 17
PATIENT'S_PREFERRED_MEANS_OF_COMMUNICATION: ENGLISH SPEAKER WITH HEARING LOSS, NO SPEECH PROBLEMS.
ADLS_ACUITY_SCORE: 17
PATIENT_/_FAMILY_COMMUNICATION_STYLE: SPOKEN LANGUAGE (ENGLISH OR BILINGUAL)
ADLS_ACUITY_SCORE: 17
ADLS_ACUITY_SCORE: 19
ADLS_ACUITY_SCORE: 17
ADLS_ACUITY_SCORE: 17
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
## 2022-01-28 NOTE — PLAN OF CARE
ICU End of Shift Summary. See flowsheets for vital signs and detailed assessment.    Changes this shift: Mentation continues to improve, A/O x 3-4. More sleepy this afternoon, believed to be from afternoon gabapentin. CIWA negligible. Brief hypotension after PTA BP meds restarted, pt asymptomatic and resolved w/o interventions. Electrolytes replaced per protocol. Tolerating regular diet. Transfer orders received.     Plan: Transfer to med/surg floor when bed is available.       Problem: Risk for Delirium  Goal: Improved Attention and Thought Clarity  Outcome: Improving

## 2022-01-28 NOTE — PLAN OF CARE
"  Problem: Bleeding (Gastrointestinal Bleeding)  Goal: Hemostasis  Outcome: No Change     Problem: Restraint for Non-Violent/Non-Self-Destructive Behavior  Goal: Prevent/Manage Potential Problems  Problem: Risk for Delirium  Goal: Improved Behavioral Control  Outcome: No Change     ICU End of Shift Summary. See flowsheets for vital signs and detailed assessment.    Changes this shift:  Patient with increasing attention and clarity.  Patient follows commands, VENCES, A/O x3.  No attempts to pull out IV/salguero and/or get out of bed.  Restraints not necessary at this time.  Speech continues to be slurred and slow.  Patient is notably [place].  Does have notable tremor at rest and with purposeful movement.  VSS.  No bloody emesis or stool overnight.  Patient did complain of \"stomach upset\" and requesting \"Tums\".  Given Zofran and Compazine with relief of these symptoms.  Has slept most of night since 2300.    Plan:  advance diet if possible.  Continue to observe for alcohol withdrawal.  Removal of salguero catheter if strict I/O no longer needed. Replace KCL via IV route since oral route did little to increase K+ level earlier in shift   "

## 2022-01-28 NOTE — PROGRESS NOTES
"CLINICAL NUTRITION SERVICES - ASSESSMENT NOTE     Nutrition Prescription    Recommendations:  Continue to encourage po intake     Malnutrition Status:    Severe malnutrition in the context of chronic illness    Future Recommendations:  If suspect po intake inadequate, RD to schedule oral nutrition supplments       REASON FOR ASSESSMENT  Mihaela Vance is a 61 year old female assessed by dietitian for malnutrition screening tool score > 2 (unsure if weight loss/reduced po intake). PMH includes h/o alcohol use disorder and hepatic steatosis, admitted for hypotension following several days of melena and coffee ground emesis. Highest suspicion is for alcohol use limiting other PO intake resulting in gastritis, hypovolemia, and concern for rhabdomyolysis    NUTRITION HISTORY  Pt is a poor historian. Denies any concerns for reduced po intake or weight loss prior to admission. Per pt's significant other (Curt), pt \"has not eaten much for years.\" He reports she is a very slow eater and generally doesn't eat much per meal. He suspect she has lost ~50 lbs over the past 10-15 years due to general deconditioning and lack of physical activity, but does not feel she has had any recent/rapid change in weight.     CURRENT NUTRITION ORDERS  Diet: Regular  Intake: Diet advanced today. Ok appetite. Eating ~50% sausage pizza with caesar salad and soda.    LABS  Labs reviewed  Na 134, K+ 3.4, Cr 1.2, Phos 1.6 (low)      MEDICATIONS  Medications reviewed  Thera-vit-M daily, IV folic acid (1 mg), IV thiamine (200 mg), protonix BID, neurtraphos 2 pkts TID    ANTHROPOMETRICS  Height:   Ht Readings from Last 2 Encounters:   09/07/21 1.727 m (5' 8\")   07/29/20 1.727 m (5' 8\")     Most Recent Weight: 59.3 kg (130 lb 11.7 oz)    IBW: 63.6 kg  BMI: Normal BMI, however, borderline underweight   Weight History: Based on records, pt has lost ~8 lbs since October 2019 (weight loss does not meet criteria). Pt's significant other (Curt) suspects pt " has lost ~50 lbs over the past 10-15 years due to general deconditioning and lack of physical activity.   Wt Readings from Last 10 Encounters:   01/28/22 59.3 kg (130 lb 11.7 oz)   01/26/22 59.8 kg (131 lb 12.8 oz)   09/07/21 57.2 kg (126 lb)   07/29/20 66.2 kg (146 lb)   10/04/19 63.1 kg (139 lb 3.2 oz)   07/23/19 64.5 kg (142 lb 3.2 oz)   06/19/19 62.6 kg (138 lb)   05/01/19 62.6 kg (138 lb)   04/23/19 62.6 kg (138 lb)   04/23/19 63.2 kg (139 lb 6.4 oz)       Dosing Weight: 59 kg (actual on 1/27)     ASSESSED NUTRITION NEEDS  Estimated Energy Needs: 1500 - 1800+ kcals/day (25 - 30+ kcals/kg)  Justification: Maintenance  Estimated Protein Needs: 70 - 90 grams protein/day (1.2 - 1.5 grams of pro/kg)  Justification: Increased needs  Estimated Fluid Needs: (1 mL/kcal)   Justification: Per provider pending fluid status    PHYSICAL FINDINGS  See malnutrition section below.    MALNUTRITION  % Intake: </=75% for >/= 1 month (severe)  % Weight Loss: Weight loss does not meet criteria  Subcutaneous Fat Loss: Facial region: Mild, Upper arm: Moderate, Lower arm: Moderate and Thoracic/intercostal: Moderate  Muscle Loss: Scapular bone: Mild, Thoracic region (clavicle, acromium bone, deltoid, trapezius, pectoral): Mild, Upper arm (bicep, tricep): Severe, Lower arm  (forearm): Severe, Dorsal hand: Moderate, Upper leg (quadricep, hamstring): Severe, Patellar region: Severe and Posterior calf: Severe   Fluid Accumulation/Edema: Does not meet criteria  Malnutrition Diagnosis: Severe malnutrition in the context of chronic illness    NUTRITION DIAGNOSIS  Inadequate oral intake related to NPO 2/2 GIB and recent diet advancement as evidenced by minimal po intake this admission, just now starting to be able to eat, currently eating ~50% of lunch meal.      INTERVENTIONS  No additional interventions at this time. See future recommendations.    Goals  Patient to consume % of nutritionally adequate meal trays TID, or the equivalent  with supplements/snacks.     Monitoring/Evaluation  Progress toward goals will be monitored and evaluated per protocol.  Lora Vann RD, LD   [facility] Pager: [phone]

## 2022-01-28 NOTE — PROGRESS NOTES
[facility]    Progress Note - Medicine Service, [facility] TEAM 1       Date of Admission:  1/27/2022    Assessment & Plan        61 year old female h/o alcohol use disorder and hepatic steatosis, admitted for hypotension following several days of melena and coffee ground emesis, found to have leukocytosis with neutrophilic predominance, TIFFANY, myoglobinuria, and lactic acidosis that responded to IV fluids. Highest suspicion is for alcohol use limiting other PO intake resulting in gastritis, hypovolemia, and concern for rhabdomyolysis.     Changes Today:  - Initiate gabapentin and thiamine taper per CIWA protocol  - Switch protonix to PO BID  - Procal, lactate, ESR, CRP   - Resumed PTA atarax  - Consult Addiction Med tomorrow    Acute Toxic & Metabolic Encephalopathy  Alcohol Use Disorder c/b withdrawal  Likely multifactorial including alcohol intoxication, benzodiazepines (given at other ED prior to arrival), and TIFFANY on CKD, now with possible component of alcohol withdrawal. Patient has a history of thiamine deficiency c/b neuropathy so could be underlying this as well. With unwitnessed falls and bruising on back concern for possible head trauma. Head CT negative for any intracranial pathology, but notable for mild generalized cerebral volume loss slightly greater than expected for age, so possible chronic component. Ammonia within normal limits and no acute liver failure or cirrhosis, so unlikely HE.  - CIWA protocol for high risk patients  - Initiate gabapentin and thiamine taper  - PRN valium and haldol  - Continue catapres patch  - Consult addiction med tomorrow that patient is more alert and oriented     UGIB secondary to severe esophagitis LA grade D  Acute on chronic iron deficiency anemia  C/f Hemolysis  - Patient presented for melena and coffee-ground emesis. Baseline Hgb appears to be 9-10, on presentation at 6.7. Most likely due to the UGIB secondary to EtOH  induced esophagitis. Patient has a history of FLORI in the past for which she takes ferrous sulfate at home. Additionally, UA notable for myoglobinuria (moderate blood but <1 RBC), however workup suggests against rhabdomyolysis. This could be a component of hemolytic anemia given normocytic MCV, although unclear given normal indirect bilirubin. Patient on folic acid, so difficult to assess if there was macrocytic overlying microcytic anemia.   - EGD performed at bedside, notable for esophagitis with no active bleed or fresh blood visible  - Switch protonix to PO BID  - Continue famotidine every day  - Continue sucrafate BID  - Resume PTA ferrous sulfate  - Continue folic acid  - Check B12  - Consider peripheral smear  - Chronic management:  -- Strict avoidance of EtOH, tobacco, NSAIDs   -- Upon discharge, outpatient EGD to follow up healing and assess for underlying Monahan's Esophagus     Acute Kidney Injury on CKD Stage 3, prerenal + heme-pigment induced, improving   Unclear rate of rise with last Cr >3months ago. Prerenal most likely etiology with hyaline casts, GI losses, and likely poor PO intake. Intrarenal component possible given patient has moderate blood but little to no RBCs in urine, suggestive of possible myoglobinuria. CK wnl and K/Phos both appear to be low, so rhabdomyolysis unlikely. However, there could be a component of hemolysis.   - Remove salguero catheter given improvement in patient's mental status  - Defer Renal US unless Cr trend does not improve with fluids and improved PO intake  - Daily BMP  - Strict I/O    Concern for Sepsis, unknown infectious source  Possible that infection is etiology of hypotension and presentation rather than bleeding, particularly with leukocytosis.  Source unclear - with emesis concern for gastroenteritis, biliary etiology unlikely with normal bili and ALP, hepatitis unlikely with normal ALT/AST, pulmonary source possible particularly aspiration pneumonitis or  pneumonia in setting of emesis and encephalopathy, additionally CXR notable for left basilar/retrocardiac atelectasis but could not rule out superimposed pneumonia. Meningitis possible in setting of encephalopathy but without nuchal rigidity, explanation of alcohol withdrawal less likely at this time.  - blood culture NGTD (24 hours), WBC normalized  - procal, ESR, CRP, lactate  - CTM     Malnutrition  - Regular diet  - Nutrition consult    Hypophosphatemia   Hypokalemia  Hypomagnesemia  - High risk for refeeding syndrome once taking PO  - High replacement protocols for K, Mg, and Phos  - Daily BMP, Mg, Phos    Hepatic steatosis 2/2 alcohol use  Increased hepatic echogenecity seen on US in 2015, no abdominal imaging since that time. Hepatitis screened for in the past normal.   - Trend LFTs    Essential Hypertension  - Hold PTA atenolol  - Hold PTA amlodipine 2.5mg daily     GERD  - Hold PTA mylanta, tums, omeprazole while acute and on protonix and famotidine    Generalized Anxiety Disorder  Panic Disorder  - Continue PTA buspirone 30 mg BID  - Resume PTA hydroxyzine 25-50 mg Q8H     Chronic Pain Syndrome  - Resume PTA diclofenac gel  - Hold PTA cyclobenzaprine, not reconciled  - Replace PTA vicodin with oxycodone (reduce APAP combo drug in fatty liver)    Type II MI- resolved  - EKG notable for T wave inversions, however, patient without any symptoms. Suspect demand ischemia in setting of hypovolemic/hemorrhagic shock  - Troponins trended down    Hyponatremia, resolved  Unclear etiology, most likely chronic secondary to beer potomania/tea-and-toast due to chronic alcohol use and malnutrition  - Can defer urine studies at this time given improvement in sodium since admit       Diet: Advance Diet as Tolerated: Regular Diet Adult    DVT Prophylaxis: Pneumatic Compression Devices  Soto Catheter: PRESENT, indication: Strict 1-2 Hour I&O  Fluids: None   Central Lines: None  Cardiac Monitoring: ACTIVE order. Indication:  ICU  Code Status: Full Code      Disposition Plan   Expected Discharge: Unknown  Anticipated discharge location: home           The patient's care was discussed with the Attending Physician, Dr. Leyva.    Valery Steel MD  Medicine Service, [facility] TEAM [address] [place] [address]  Securely message with the Vocera Web Console (learn more here)  Text page via [employer] Paging/Directory   Please see signed in provider for up to date coverage information      ______________________________________________________________________    Interval History   Patient reports feeling well and has no active complaints. Significant other at bedside.    Data reviewed today: I reviewed all medications, new labs and imaging results over the last 24 hours. I personally reviewed no images or EKG's today.    Physical Exam   Vital Signs: Temp: 98.1  F (36.7  C) Temp src: Oral BP: (!) 135/94 Pulse: 83   Resp: 16 SpO2: 99 % O2 Device: None (Room air)    Weight: 130 lbs 11.72 oz  Constitutional: awake, alert, not in acute distress, appears stated  age  Eyes: EOMI, sclera anicteric, conjunctiva normal, pupils equal  ENT: normocepalic, without obvious abnormality, atramatic, lips normal, mucosa moist  Hematologic / Lymphatic: no cervical lymphadenopathy  Respiratory: No increased work of breathing, good air exchange, clear to auscultation bilaterally, no crackles or wheezing  Cardiovascular: regular rate and rhythm, normal S1 and S2, no murmur noted and no edema  GI: normal bowel sounds, soft, non-distended and non-tender  Skin: no bruising or bleeding  Musculoskeletal: no lower extremity pitting edema present  Neurologic: Mental Status Exam:  Level of Alertness:   Awake, alert  Neuropsychiatric: General: normal, calm and normal eye contact  Level of consciousness: alert / normal    Data   Recent Labs   Lab 01/28/22  0506 01/28/22  0503 01/27/22  2100 01/27/22  2058 01/27/22  1830 01/27/22  1258  01/27/22  1140 01/27/22  0909 01/27/22  0431 01/27/22  0343 01/27/22  0042 01/26/22  2351 01/26/22  1852   WBC 9.3  --   --   --   --   --   --   --  15.1*  --   --  9.3 16.5*   HGB 11.1*  --   --   --  11.9  --  11.5*  --  12.1  --    < > 6.7* 9.2*   MCV 92  --   --   --   --   --   --   --  92  --   --  99 99   *  --   --   --   --   --   --   --  159  --   --  114* 303   INR 1.14  --   --   --   --   --   --   --   --   --   --   --  1.28*     134  --   --   --  136  --   --   --   --  134   < > 145* 128*   POTASSIUM 3.4  3.4  --   --  3.3* 3.3*  --   --   --   --  3.8  3.7   < > 2.4* 3.5   CHLORIDE 99  100  --   --   --  99  --   --   --   --  100   < > 119* 80*   CO2 28  29  --   --   --  30  --   --   --   --  23   < > 20 27   BUN 42*  42*  --   --   --  70*  --   --   --   --  96*   < > 65* 109*   CR 1.16*  1.13*  --   --   --  1.41*  --   --   --   --  1.72*   < > 0.92 2.01*   ANIONGAP 7  5  --   --   --  7  --   --   --   --  11   < > 6 21*   SUE 8.3*  8.6  --   --   --  9.1  --   --   --   --  9.2   < >  --  11.6*   *  117* 116* 105*  --  96   < >  --    < >  --  116*   < > 130* 145*   ALBUMIN 2.4*  --   --   --   --   --   --   --   --  2.4*  --   --  3.3*   PROTTOTAL 5.7*  --   --   --   --   --   --   --   --  5.8*  --   --  7.3   BILITOTAL 1.0  --   --   --   --   --   --   --   --  1.3  --   --  0.9   ALKPHOS 70  --   --   --   --   --   --   --   --  72  --   --  97   ALT 52*  --   --   --   --   --   --   --   --  27  --   --  24   AST 94*  --   --   --   --   --   --   --   --  63*  --   --  31    < > = values in this interval not displayed.

## 2022-01-28 NOTE — PLAN OF CARE
ICU End of Shift Summary. See flowsheets for vital signs and detailed assessment.    Changes this shift: Semicomatose but starting to wake up this evening and inconsistently follow commands; hemodynamically stable; no vomiting or stool this shift, GI EGD complete with no signs of active bleeds and no interventions at this time    Plan: Continue to monitor, follow plan of care    Problem: Adult Inpatient Plan of Care  Goal: Plan of Care Review  Outcome: No Change     Problem: Bleeding (Gastrointestinal Bleeding)  Goal: Hemostasis  Outcome: Improving

## 2022-01-29 LAB
ALBUMIN SERPL-MCNC: 2.2 G/DL (ref 3.4–5)
ALP SERPL-CCNC: 73 U/L (ref 40–150)
ALT SERPL W P-5'-P-CCNC: 41 U/L (ref 0–50)
ANION GAP SERPL CALCULATED.3IONS-SCNC: 7 MMOL/L (ref 3–14)
AST SERPL W P-5'-P-CCNC: 48 U/L (ref 0–45)
BILIRUB SERPL-MCNC: 0.9 MG/DL (ref 0.2–1.3)
BUN SERPL-MCNC: 25 MG/DL (ref 7–30)
CALCIUM SERPL-MCNC: 8.5 MG/DL (ref 8.5–10.1)
CHLORIDE BLD-SCNC: 100 MMOL/L (ref 94–109)
CO2 SERPL-SCNC: 28 MMOL/L (ref 20–32)
CREAT SERPL-MCNC: 1.09 MG/DL (ref 0.52–1.04)
CRP SERPL-MCNC: 29 MG/L (ref 0–8)
ERYTHROCYTE [DISTWIDTH] IN BLOOD BY AUTOMATED COUNT: 14.8 % (ref 10–15)
ERYTHROCYTE [SEDIMENTATION RATE] IN BLOOD BY WESTERGREN METHOD: 19 MM/HR (ref 0–30)
GFR SERPL CREATININE-BSD FRML MDRD: 57 ML/MIN/1.73M2
GLUCOSE BLD-MCNC: 114 MG/DL (ref 70–99)
HCT VFR BLD AUTO: 30.5 % (ref 35–47)
HGB BLD-MCNC: 10 G/DL (ref 11.7–15.7)
LACTATE SERPL-SCNC: 0.8 MMOL/L (ref 0.7–2)
MAGNESIUM SERPL-MCNC: 1.9 MG/DL (ref 1.6–2.3)
MCH RBC QN AUTO: 31.8 PG (ref 26.5–33)
MCHC RBC AUTO-ENTMCNC: 32.8 G/DL (ref 31.5–36.5)
MCV RBC AUTO: 97 FL (ref 78–100)
PHOSPHATE SERPL-MCNC: 2.9 MG/DL (ref 2.5–4.5)
PLATELET # BLD AUTO: 124 10E3/UL (ref 150–450)
POTASSIUM BLD-SCNC: 3.7 MMOL/L (ref 3.4–5.3)
PROCALCITONIN SERPL-MCNC: 0.23 NG/ML
PROT SERPL-MCNC: 5.2 G/DL (ref 6.8–8.8)
RBC # BLD AUTO: 3.14 10E6/UL (ref 3.8–5.2)
SODIUM SERPL-SCNC: 135 MMOL/L (ref 133–144)
WBC # BLD AUTO: 6.1 10E3/UL (ref 4–11)

## 2022-01-29 PROCEDURE — 250N000013 HC RX MED GY IP 250 OP 250 PS 637

## 2022-01-29 PROCEDURE — 82040 ASSAY OF SERUM ALBUMIN: CPT | Performed by: NURSE PRACTITIONER

## 2022-01-29 PROCEDURE — 258N000003 HC RX IP 258 OP 636

## 2022-01-29 PROCEDURE — 86706 HEP B SURFACE ANTIBODY: CPT | Performed by: INTERNAL MEDICINE

## 2022-01-29 PROCEDURE — 99207 PR APP CREDIT; MD BILLING SHARED VISIT: CPT | Performed by: INTERNAL MEDICINE

## 2022-01-29 PROCEDURE — 99233 SBSQ HOSP IP/OBS HIGH 50: CPT | Mod: GC | Performed by: HOSPITALIST

## 2022-01-29 PROCEDURE — 86140 C-REACTIVE PROTEIN: CPT

## 2022-01-29 PROCEDURE — 84100 ASSAY OF PHOSPHORUS: CPT | Performed by: NURSE PRACTITIONER

## 2022-01-29 PROCEDURE — 83735 ASSAY OF MAGNESIUM: CPT | Performed by: NURSE PRACTITIONER

## 2022-01-29 PROCEDURE — 250N000013 HC RX MED GY IP 250 OP 250 PS 637: Performed by: STUDENT IN AN ORGANIZED HEALTH CARE EDUCATION/TRAINING PROGRAM

## 2022-01-29 PROCEDURE — 80053 COMPREHEN METABOLIC PANEL: CPT | Performed by: NURSE PRACTITIONER

## 2022-01-29 PROCEDURE — 250N000013 HC RX MED GY IP 250 OP 250 PS 637: Performed by: NURSE PRACTITIONER

## 2022-01-29 PROCEDURE — 250N000011 HC RX IP 250 OP 636: Performed by: HOSPITALIST

## 2022-01-29 PROCEDURE — 85027 COMPLETE CBC AUTOMATED: CPT

## 2022-01-29 PROCEDURE — 999N000128 HC STATISTIC PERIPHERAL IV START W/O US GUIDANCE

## 2022-01-29 PROCEDURE — 36415 COLL VENOUS BLD VENIPUNCTURE: CPT | Performed by: INTERNAL MEDICINE

## 2022-01-29 PROCEDURE — 83605 ASSAY OF LACTIC ACID: CPT

## 2022-01-29 PROCEDURE — 84145 PROCALCITONIN (PCT): CPT

## 2022-01-29 PROCEDURE — 36415 COLL VENOUS BLD VENIPUNCTURE: CPT

## 2022-01-29 PROCEDURE — 87340 HEPATITIS B SURFACE AG IA: CPT | Performed by: INTERNAL MEDICINE

## 2022-01-29 PROCEDURE — 120N000002 HC R&B MED SURG/OB UMMC

## 2022-01-29 PROCEDURE — 250N000013 HC RX MED GY IP 250 OP 250 PS 637: Performed by: HOSPITALIST

## 2022-01-29 PROCEDURE — 250N000011 HC RX IP 250 OP 636: Performed by: STUDENT IN AN ORGANIZED HEALTH CARE EDUCATION/TRAINING PROGRAM

## 2022-01-29 PROCEDURE — 86708 HEPATITIS A ANTIBODY: CPT | Performed by: INTERNAL MEDICINE

## 2022-01-29 PROCEDURE — 250N000013 HC RX MED GY IP 250 OP 250 PS 637: Performed by: INTERNAL MEDICINE

## 2022-01-29 PROCEDURE — 85652 RBC SED RATE AUTOMATED: CPT

## 2022-01-29 RX ORDER — NALTREXONE HYDROCHLORIDE 50 MG/1
50 TABLET, FILM COATED ORAL DAILY
Status: DISCONTINUED | OUTPATIENT
Start: 2022-01-29 | End: 2022-02-02 | Stop reason: HOSPADM

## 2022-01-29 RX ORDER — POTASSIUM CHLORIDE 750 MG/1
10 TABLET, EXTENDED RELEASE ORAL ONCE
Status: COMPLETED | OUTPATIENT
Start: 2022-01-29 | End: 2022-01-29

## 2022-01-29 RX ORDER — MAGNESIUM SULFATE HEPTAHYDRATE 40 MG/ML
2 INJECTION, SOLUTION INTRAVENOUS ONCE
Status: COMPLETED | OUTPATIENT
Start: 2022-01-29 | End: 2022-01-29

## 2022-01-29 RX ORDER — PHENOL 1.4 %
10 AEROSOL, SPRAY (ML) MUCOUS MEMBRANE
COMMUNITY

## 2022-01-29 RX ORDER — DIPHENHYDRAMINE HCL 25 MG
25 TABLET ORAL EVERY 6 HOURS PRN
COMMUNITY

## 2022-01-29 RX ORDER — HYDROXYZINE HYDROCHLORIDE 25 MG/1
25-50 TABLET, FILM COATED ORAL EVERY 8 HOURS PRN
Status: DISCONTINUED | OUTPATIENT
Start: 2022-01-29 | End: 2022-01-31

## 2022-01-29 RX ADMIN — AMLODIPINE BESYLATE 2.5 MG: 2.5 TABLET ORAL at 08:22

## 2022-01-29 RX ADMIN — NALTREXONE HYDROCHLORIDE 50 MG: 50 TABLET, FILM COATED ORAL at 13:07

## 2022-01-29 RX ADMIN — GABAPENTIN 600 MG: 300 CAPSULE ORAL at 13:06

## 2022-01-29 RX ADMIN — Medication 1 TABLET: at 08:22

## 2022-01-29 RX ADMIN — THIAMINE HCL TAB 100 MG 100 MG: 100 TAB at 08:22

## 2022-01-29 RX ADMIN — DICLOFENAC SODIUM 2 G: 10 GEL TOPICAL at 08:24

## 2022-01-29 RX ADMIN — DICLOFENAC SODIUM 2 G: 10 GEL TOPICAL at 15:57

## 2022-01-29 RX ADMIN — THIAMINE HCL TAB 100 MG 100 MG: 100 TAB at 20:09

## 2022-01-29 RX ADMIN — Medication 400 MG: at 20:09

## 2022-01-29 RX ADMIN — SUCRALFATE 1 G: 1 SUSPENSION ORAL at 08:24

## 2022-01-29 RX ADMIN — SODIUM CHLORIDE, POTASSIUM CHLORIDE, SODIUM LACTATE AND CALCIUM CHLORIDE 250 ML: 600; 310; 30; 20 INJECTION, SOLUTION INTRAVENOUS at 20:04

## 2022-01-29 RX ADMIN — FOLIC ACID 1 MG: 5 INJECTION, SOLUTION INTRAMUSCULAR; INTRAVENOUS; SUBCUTANEOUS at 08:23

## 2022-01-29 RX ADMIN — MAGNESIUM SULFATE HEPTAHYDRATE 2 G: 40 INJECTION, SOLUTION INTRAVENOUS at 06:18

## 2022-01-29 RX ADMIN — PANTOPRAZOLE SODIUM 40 MG: 40 TABLET, DELAYED RELEASE ORAL at 15:58

## 2022-01-29 RX ADMIN — Medication 5 MG: at 22:16

## 2022-01-29 RX ADMIN — PANTOPRAZOLE SODIUM 40 MG: 40 TABLET, DELAYED RELEASE ORAL at 08:22

## 2022-01-29 RX ADMIN — BUSPIRONE HYDROCHLORIDE 30 MG: 30 TABLET ORAL at 08:22

## 2022-01-29 RX ADMIN — GABAPENTIN 600 MG: 300 CAPSULE ORAL at 20:11

## 2022-01-29 RX ADMIN — DICLOFENAC SODIUM 2 G: 10 GEL TOPICAL at 13:05

## 2022-01-29 RX ADMIN — POTASSIUM CHLORIDE 10 MEQ: 750 TABLET, EXTENDED RELEASE ORAL at 08:23

## 2022-01-29 RX ADMIN — FERROUS SULFATE TAB 325 MG (65 MG ELEMENTAL FE) 325 MG: 325 (65 FE) TAB at 08:22

## 2022-01-29 RX ADMIN — ATENOLOL 50 MG: 50 TABLET ORAL at 08:22

## 2022-01-29 RX ADMIN — FAMOTIDINE 20 MG: 10 TABLET, FILM COATED ORAL at 08:22

## 2022-01-29 RX ADMIN — SUCRALFATE 1 G: 1 SUSPENSION ORAL at 15:58

## 2022-01-29 RX ADMIN — DICLOFENAC SODIUM 2 G: 10 GEL TOPICAL at 20:13

## 2022-01-29 RX ADMIN — THIAMINE HCL TAB 100 MG 100 MG: 100 TAB at 13:05

## 2022-01-29 RX ADMIN — BUSPIRONE HYDROCHLORIDE 30 MG: 30 TABLET ORAL at 20:10

## 2022-01-29 RX ADMIN — SODIUM CHLORIDE, POTASSIUM CHLORIDE, SODIUM LACTATE AND CALCIUM CHLORIDE 1000 ML: 600; 310; 30; 20 INJECTION, SOLUTION INTRAVENOUS at 15:57

## 2022-01-29 RX ADMIN — Medication 400 MG: at 08:22

## 2022-01-29 RX ADMIN — GABAPENTIN 600 MG: 300 CAPSULE ORAL at 04:55

## 2022-01-29 ASSESSMENT — ACTIVITIES OF DAILY LIVING (ADL)
ADLS_ACUITY_SCORE: 19

## 2022-01-29 NOTE — PHARMACY-ADMISSION MEDICATION HISTORY
Admission Medication History Completed by Pharmacy    See [facility] Admission Navigator for allergy information, preferred outpatient pharmacy, prior to admission medications and immunization status.     Medication History Sources:     Dispense report    Patient    Changes made to PTA medication list (reason):    Added:   o OTC Benadryl (per patient, takes as needed for sleep)  o OTC Melatonin 10 mg tablets (per patient, takes as needed for sleep)    Deleted: None    Changed: None    Additional Information:    Patient has general knowledge regarding medications, details regarding medication history were completed using dispense report.     Patient reports she has an allergy to a cholesterol medication, but does not remember name of medication.    Patient reports she is not taking Vitamin D      Prior to Admission medications    Medication Sig Last Dose Taking? Auth Provider   alum & mag hydroxide-simethicone (MYLANTA/MAALOX) 200-200-20 MG/5ML SUSP suspension Take 30 mLs by mouth 2 times daily Unknown at Unknown time Yes Reported, Patient   amLODIPine (NORVASC) 2.5 MG tablet Take 1 tablet (2.5 mg) by mouth daily Unknown at Unknown time Yes Kylah Sánchez APRN CNP   atenolol (TENORMIN) 50 MG tablet Take 1 tablet (50 mg) by mouth daily Unknown at Unknown time Yes Kylah Sánchez APRN CNP   B Complex Vitamins (B COMPLEX PO) Take 1 tablet by mouth daily. Unknown at Unknown time Yes Reported, Patient   busPIRone HCl (BUSPAR) 30 MG tablet Take 1 tablet (30 mg) by mouth 2 times daily Unknown at Unknown time Yes Kylah Sánchez APRN CNP   calcium carbonate (TUMS) 500 MG chewable tablet Take 2 chew tab by mouth 3 times daily Unknown at Unknown time Yes Reported, Patient   Calcium Carbonate-Vitamin D (CALCIUM + D PO) Take 2 tablets by mouth daily. Unknown at Unknown time Yes Reported, Patient   diclofenac (VOLTAREN) 1 % topical gel Place 2 g onto the skin 4 times daily as needed for moderate pain Unknown at  Unknown time Yes Kylah Sánchez APRN CNP   diphenhydrAMINE (BENADRYL ALLERGY) 25 MG tablet Take 25 mg by mouth every 6 hours as needed for itching or allergies Unknown at Unknown time Yes Unknown, Entered By History   ferrous sulfate (FEROSUL) 325 (65 Fe) MG tablet Take 1 tablet (325 mg) by mouth daily (with breakfast) Take with orange juice to enhance absorption. Unknown at Unknown time Yes Kylah Sánchez APRN CNP   FOLIC ACID PO Take 400 mcg by mouth daily Unknown at Unknown time Yes Reported, Patient   hydrOXYzine (ATARAX) 25 MG tablet Take 1-2 tablets (25-50 mg) by mouth every 8 hours as needed for itching Unknown at Unknown time Yes Kylah Sánchez APRN CNP   magnesium oxide (MAG-OX) 400 MG tablet Take 1 tablet (400 mg) by mouth daily Unknown at Unknown time Yes Ralph Aviles MD   Melatonin 10 MG TABS tablet Take 10 mg by mouth nightly as needed for sleep Unknown at Unknown time Yes Unknown, Entered By History   Omega-3 Fatty Acids (FISH OIL PO) Take 1 capsule by mouth daily  Unknown at Unknown time Yes Reported, Patient   omeprazole (PRILOSEC) 40 MG DR capsule Take 1 capsule (40 mg) by mouth daily Unknown at Unknown time Yes Kylah Sánchez APRN CNP   vitamin B1 (THIAMINE) 100 MG tablet Take 1 tab 3 times daily by mouth for 1 week and then decrease to 1 tab daily. Unknown at Unknown time Yes Kylah Sánchez APRN CNP   VITAMIN D, CHOLECALCIFEROL, PO Take 400 Units by mouth daily    Reported, Patient       Date completed: 01/29/22    Medication history completed by: Catie Mcqueen, PD2 Pharmacy Intern
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
## 2022-01-29 NOTE — PLAN OF CARE
Major Shift Events: Med/surg orders. VSS on room air. Denies pain. BM x2. Incontinent of urine x1, continent x2. Mag and potassium replace in AM per protocol.      Plan: Transfer to m/s floor when bed becomes available.     For vital signs and complete assessments, please see documentation flowsheets.

## 2022-01-29 NOTE — PROGRESS NOTES
[facility]    Progress Note - Medicine Service, MAROON TEAM 1       Date of Admission:  1/27/2022    Assessment & Plan        61 year old female h/o alcohol use disorder and hepatic steatosis, admitted for hypotension following several days of melena and coffee ground emesis, found to have leukocytosis with neutrophilic predominance, TIFFANY, myoglobinuria, and lactic acidosis that responded to IV fluids. Highest suspicion is for alcohol use limiting other PO intake resulting in gastritis, hypovolemia, and concern for rhabdomyolysis.     Changes Today:  - Hold PTA anti-hypertensives  - Chem dependency evaluation  - Start naltrexone 50 mg daily  - Outpatient Nephrology referral upon discharge  - Trend inflammatory markers     Acute Toxic & Metabolic Encephalopathy  Alcohol Use Disorder c/b withdrawal  Likely multifactorial including alcohol intoxication, benzodiazepines (given at other ED prior to arrival), and TIFFANY on CKD, now with possible component of alcohol withdrawal. Patient has a history of thiamine deficiency c/b neuropathy so could be underlying this as well. With unwitnessed falls and bruising on back concern for possible head trauma. Head CT negative for any intracranial pathology, but notable for mild generalized cerebral volume loss slightly greater than expected for age, so possible chronic component. Ammonia within normal limits and no acute liver failure or cirrhosis, so unlikely HE.  - CIWA benzo-sparing protocol for high risk patients  - Chem dep evaluation today with Dr. De Anda  - Patient willing to start naltrexone 50 mg daily  - Continue gabapentin taper  - Continue thiamine and folic acid supplementation  - PRN valium and haldol  - Continue catapres patch     UGIB secondary to severe esophagitis LA grade D  Acute on chronic iron deficiency anemia  C/f Hemolysis  - Patient presented for melena and coffee-ground emesis. Baseline Hgb appears to be 9-10,  on presentation at 6.7. Most likely due to the UGIB secondary to EtOH induced esophagitis. Patient has a history of FLORI in the past for which she takes ferrous sulfate at home. Additionally, UA notable for myoglobinuria (moderate blood but <1 RBC), however workup suggests against rhabdomyolysis. This could be a component of hemolytic anemia given normocytic MCV, although unclear given normal indirect bilirubin. Patient on folic acid, so difficult to assess if there was macrocytic overlying microcytic anemia. B12 wnl and patient currently on folic acid supplementation.  - EGD performed at bedside, notable for esophagitis with no active bleed or fresh blood visible   - Switch protonix to PO BID  - Continue famotidine every day  - Continue sucrafate BID  - Continue PTA ferrous sulfate  - Continue folic acid  - Can consider peripheral smear  - Chronic management:  -- Strict avoidance of EtOH, tobacco, NSAIDs   -- Upon discharge, outpatient EGD to follow up healing and assess for underlying Monahan's Esophagus      Acute Kidney Injury on CKD Stage 3, prerenal + heme-pigment induced, improving   Unclear rate of rise with last Cr >3months ago. Prerenal most likely etiology with hyaline casts, GI losses, and likely poor PO intake. Intrarenal component possible given patient has moderate blood but little to no RBCs in urine, suggestive of possible myoglobinuria. CK wnl and K/Phos both appear to be low, so rhabdomyolysis unlikely. However, there could be a component of hemolysis vs nephrotic syndrome. Patient has been referred to Nephrology in the past for workup of CKD3, however, pt has not seen them yet.  - Defer Renal US unless Cr trend does not improve with fluids and improved PO intake  - Daily BMP  - Strict I/O  - Nephrology referral on discharge     Concern for Sepsis, unknown infectious source  Possible that infection is etiology of hypotension and presentation rather than bleeding, particularly with  leukocytosis.  Source unclear - with emesis concern for gastroenteritis, biliary etiology unlikely with normal bili and ALP, hepatitis unlikely with normal ALT/AST, pulmonary source possible particularly aspiration pneumonitis or pneumonia in setting of emesis and encephalopathy, additionally CXR notable for left basilar/retrocardiac atelectasis but could not rule out superimposed pneumonia. Procal elevated at 0.23 and CRP elevated at 29. Blood cultures NGTD.  - Given stability and lack of respiratory symptoms, hold off on antibiotics for now.  - Trend procal, CRP, and CBC     Malnutrition  - Regular diet  - Nutrition consult     Hypophosphatemia   Hypokalemia  Hypomagnesemia  - High risk for refeeding syndrome once taking PO  - High replacement protocols for K, Mg, and Phos  - Daily BMP, Mg, Phos     Hepatic steatosis 2/2 alcohol use  Increased hepatic echogenecity seen on US in 2015, no abdominal imaging since that time. Hepatitis screened for in the past normal.   - Trend LFTs     Essential Hypertension  - Hold PTA atenolol  - Hold PTA amlodipine 2.5mg daily      GERD  - Hold PTA mylanta, tums, omeprazole while acute and on protonix and famotidine     Generalized Anxiety Disorder  Panic Disorder  - Continue PTA buspirone 30 mg BID  - Continue PTA hydroxyzine 25-50 mg Q8H PRN     Chronic Pain Syndrome  - Continue PTA PRN diclofenac gel  - Hold PTA cyclobenzaprine, not reconciled  - Hold PTA vicodin (avoiding APAP in hepatic steatosis)  - Discontinue oxycodone, starting naltrexone and patient not using         Diet: Advance Diet as Tolerated: Regular Diet Adult    DVT Prophylaxis: Pneumatic Compression Devices  Soto Catheter: Not present  Fluids: None  Central Lines: None  Cardiac Monitoring: ACTIVE order. Indication: ICU  Code Status: Full Code      Disposition Plan   Expected Discharge: 1-2 days  Anticipated discharge location: home         The patient's care was discussed with the Attending Physician,   Gordon.    Valery Steel MD  Medicine Service, MAROON TEAM 1  [facility]  Securely message with the [employer] Web Console (learn more here)  Text page via [facility] Paging/Directory   Please see signed in provider for up to date coverage information      Clinically Significant Risk Factors Present on Admission   # Severe Malnutrition: based on nutrition assessment     ______________________________________________________________________    Interval History   NAEO. Patient hypotensive early this AM with BP in 70s/50s, resolved without intervention. AM BP meds held.    Data reviewed today: I reviewed all medications, new labs and imaging results over the last 24 hours. I personally reviewed no images or EKG's today.    Physical Exam   Vital Signs: Temp: 98.2  F (36.8  C) Temp src: Oral BP: 91/63 Pulse: 65   Resp: 11 SpO2: 100 % O2 Device: None (Room air)    Weight: 130 lbs 11.72 oz  Constitutional: awake, alert, cooperative, no apparent distress, and appears stated age  Eyes: EOMI, sclera anicteric, conjunctiva normal  Respiratory: No increased work of breathing, good air exchange, clear to auscultation bilaterally, no crackles or wheezing  Cardiovascular: regular rate and rhythm, normal S1 and S2, no murmur noted and no edema  GI: normal bowel sounds, soft and non-distended  Skin: no bruising or bleeding  Musculoskeletal: no lower extremity pitting edema present  Neurologic: Mental Status Exam:  Level of Alertness:   awake  Orientation:   person, place, time  Neuropsychiatric: General: normal, calm and normal eye contact  Level of consciousness: alert / normal    Data   Recent Labs   Lab 01/29/22  0442 01/28/22  0506 01/28/22  0503 01/27/22  2100 01/27/22  2058 01/27/22  1830 01/27/22  0909 01/27/22  0431 01/26/22  2351 01/26/22  1852   WBC 6.1 9.3  --   --   --   --   --  15.1*   < > 16.5*   HGB 10.0* 11.1*  --   --   --  11.9   < > 12.1   < > 9.2*   MCV 97 92  --   --    --   --   --  92   < > 99   * 149*  --   --   --   --   --  159   < > 303   INR  --  1.14  --   --   --   --   --   --   --  1.28*    134  134  --   --   --  136  --   --    < > 128*   POTASSIUM 3.7 3.4  3.4  --   --  3.3* 3.3*  --   --    < > 3.5   CHLORIDE 100 99  100  --   --   --  99  --   --    < > 80*   CO2 28 28  29  --   --   --  30  --   --    < > 27   BUN 25 42*  42*  --   --   --  70*  --   --    < > 109*   CR 1.09* 1.16*  1.13*  --   --   --  1.41*  --   --    < > 2.01*   ANIONGAP 7 7  5  --   --   --  7  --   --    < > 21*   SUE 8.5 8.3*  8.6  --   --   --  9.1  --   --    < > 11.6*   * 119*  117* 116*   < >  --  96   < >  --    < > 145*   ALBUMIN 2.2* 2.4*  --   --   --   --   --   --    < > 3.3*   PROTTOTAL 5.2* 5.7*  --   --   --   --   --   --    < > 7.3   BILITOTAL 0.9 1.0  --   --   --   --   --   --    < > 0.9   ALKPHOS 73 70  --   --   --   --   --   --    < > 97   ALT 41 52*  --   --   --   --   --   --    < > 24   AST 48* 94*  --   --   --   --   --   --    < > 31    < > = values in this interval not displayed.

## 2022-01-30 ENCOUNTER — APPOINTMENT (OUTPATIENT)
Dept: PHYSICAL THERAPY | Facility: CLINIC | Age: 62
DRG: 380 | End: 2022-01-30
Attending: INTERNAL MEDICINE
Payer: COMMERCIAL

## 2022-01-30 PROBLEM — D50.0 IRON DEFICIENCY ANEMIA DUE TO CHRONIC BLOOD LOSS: Status: ACTIVE | Noted: 2019-05-01

## 2022-01-30 PROBLEM — N18.30 CKD (CHRONIC KIDNEY DISEASE) STAGE 3, GFR 30-59 ML/MIN (H): Status: ACTIVE | Noted: 2019-10-04

## 2022-01-30 PROBLEM — F10.20 ALCOHOLISM (H): Status: ACTIVE | Noted: 2022-01-30

## 2022-01-30 PROBLEM — E51.11 THIAMINE DEFICIENCY NEUROPATHY: Status: ACTIVE | Noted: 2019-05-01

## 2022-01-30 PROBLEM — E78.5 HYPERLIPIDEMIA LDL GOAL <100: Chronic | Status: ACTIVE | Noted: 2018-08-28

## 2022-01-30 LAB
ALBUMIN SERPL-MCNC: 2 G/DL (ref 3.4–5)
ALP SERPL-CCNC: 100 U/L (ref 40–150)
ALT SERPL W P-5'-P-CCNC: 42 U/L (ref 0–50)
ANION GAP SERPL CALCULATED.3IONS-SCNC: 5 MMOL/L (ref 3–14)
AST SERPL W P-5'-P-CCNC: 41 U/L (ref 0–45)
BILIRUB SERPL-MCNC: 0.5 MG/DL (ref 0.2–1.3)
BUN SERPL-MCNC: 24 MG/DL (ref 7–30)
CALCIUM SERPL-MCNC: 8.1 MG/DL (ref 8.5–10.1)
CHLORIDE BLD-SCNC: 104 MMOL/L (ref 94–109)
CO2 SERPL-SCNC: 26 MMOL/L (ref 20–32)
CREAT SERPL-MCNC: 0.94 MG/DL (ref 0.52–1.04)
CRP SERPL-MCNC: 22 MG/L (ref 0–8)
ERYTHROCYTE [DISTWIDTH] IN BLOOD BY AUTOMATED COUNT: 15.1 % (ref 10–15)
FERRITIN SERPL-MCNC: 193 NG/ML (ref 8–252)
GFR SERPL CREATININE-BSD FRML MDRD: 68 ML/MIN/1.73M2
GLUCOSE BLD-MCNC: 138 MG/DL (ref 70–99)
HAV IGG SER QL IA: NONREACTIVE
HBV SURFACE AB SERPL IA-ACNC: 189.72 M[IU]/ML
HCT VFR BLD AUTO: 29.6 % (ref 35–47)
HGB BLD-MCNC: 9.5 G/DL (ref 11.7–15.7)
IRON SATN MFR SERPL: 7 % (ref 15–46)
IRON SERPL-MCNC: 18 UG/DL (ref 35–180)
MAGNESIUM SERPL-MCNC: 2.1 MG/DL (ref 1.6–2.3)
MCH RBC QN AUTO: 32.2 PG (ref 26.5–33)
MCHC RBC AUTO-ENTMCNC: 32.1 G/DL (ref 31.5–36.5)
MCV RBC AUTO: 100 FL (ref 78–100)
PHOSPHATE SERPL-MCNC: 1.9 MG/DL (ref 2.5–4.5)
PLATELET # BLD AUTO: 144 10E3/UL (ref 150–450)
POTASSIUM BLD-SCNC: 4.1 MMOL/L (ref 3.4–5.3)
PROCALCITONIN SERPL-MCNC: 0.13 NG/ML
PROT SERPL-MCNC: 5.1 G/DL (ref 6.8–8.8)
RBC # BLD AUTO: 2.95 10E6/UL (ref 3.8–5.2)
SODIUM SERPL-SCNC: 135 MMOL/L (ref 133–144)
TIBC SERPL-MCNC: 241 UG/DL (ref 240–430)
TRANSFERRIN SERPL-MCNC: 153 MG/DL (ref 210–360)
WBC # BLD AUTO: 5.1 10E3/UL (ref 4–11)

## 2022-01-30 PROCEDURE — 84466 ASSAY OF TRANSFERRIN: CPT | Performed by: STUDENT IN AN ORGANIZED HEALTH CARE EDUCATION/TRAINING PROGRAM

## 2022-01-30 PROCEDURE — 84145 PROCALCITONIN (PCT): CPT

## 2022-01-30 PROCEDURE — 83550 IRON BINDING TEST: CPT | Performed by: STUDENT IN AN ORGANIZED HEALTH CARE EDUCATION/TRAINING PROGRAM

## 2022-01-30 PROCEDURE — 250N000013 HC RX MED GY IP 250 OP 250 PS 637: Performed by: HOSPITALIST

## 2022-01-30 PROCEDURE — 258N000003 HC RX IP 258 OP 636: Performed by: HOSPITALIST

## 2022-01-30 PROCEDURE — 86140 C-REACTIVE PROTEIN: CPT

## 2022-01-30 PROCEDURE — 80053 COMPREHEN METABOLIC PANEL: CPT

## 2022-01-30 PROCEDURE — 99233 SBSQ HOSP IP/OBS HIGH 50: CPT | Mod: GC | Performed by: HOSPITALIST

## 2022-01-30 PROCEDURE — 82040 ASSAY OF SERUM ALBUMIN: CPT

## 2022-01-30 PROCEDURE — 97161 PT EVAL LOW COMPLEX 20 MIN: CPT | Mod: GP

## 2022-01-30 PROCEDURE — 250N000013 HC RX MED GY IP 250 OP 250 PS 637

## 2022-01-30 PROCEDURE — 250N000013 HC RX MED GY IP 250 OP 250 PS 637: Performed by: STUDENT IN AN ORGANIZED HEALTH CARE EDUCATION/TRAINING PROGRAM

## 2022-01-30 PROCEDURE — 83735 ASSAY OF MAGNESIUM: CPT | Performed by: NURSE PRACTITIONER

## 2022-01-30 PROCEDURE — 250N000013 HC RX MED GY IP 250 OP 250 PS 637: Performed by: NURSE PRACTITIONER

## 2022-01-30 PROCEDURE — 97530 THERAPEUTIC ACTIVITIES: CPT | Mod: GP

## 2022-01-30 PROCEDURE — 97116 GAIT TRAINING THERAPY: CPT | Mod: GP

## 2022-01-30 PROCEDURE — 82728 ASSAY OF FERRITIN: CPT | Performed by: STUDENT IN AN ORGANIZED HEALTH CARE EDUCATION/TRAINING PROGRAM

## 2022-01-30 PROCEDURE — 120N000002 HC R&B MED SURG/OB UMMC

## 2022-01-30 PROCEDURE — 250N000013 HC RX MED GY IP 250 OP 250 PS 637: Performed by: INTERNAL MEDICINE

## 2022-01-30 PROCEDURE — 36415 COLL VENOUS BLD VENIPUNCTURE: CPT

## 2022-01-30 PROCEDURE — 85014 HEMATOCRIT: CPT

## 2022-01-30 PROCEDURE — 84100 ASSAY OF PHOSPHORUS: CPT | Performed by: NURSE PRACTITIONER

## 2022-01-30 RX ORDER — MIDODRINE HYDROCHLORIDE 5 MG/1
5 TABLET ORAL
Status: DISCONTINUED | OUTPATIENT
Start: 2022-01-30 | End: 2022-01-31

## 2022-01-30 RX ADMIN — MIDODRINE HYDROCHLORIDE 5 MG: 5 TABLET ORAL at 18:07

## 2022-01-30 RX ADMIN — POTASSIUM & SODIUM PHOSPHATES POWDER PACK 280-160-250 MG 2 PACKET: 280-160-250 PACK at 13:03

## 2022-01-30 RX ADMIN — BUSPIRONE HYDROCHLORIDE 30 MG: 30 TABLET ORAL at 20:02

## 2022-01-30 RX ADMIN — FERROUS SULFATE TAB 325 MG (65 MG ELEMENTAL FE) 325 MG: 325 (65 FE) TAB at 08:01

## 2022-01-30 RX ADMIN — FOLIC ACID 1 MG: 1 TABLET ORAL at 08:02

## 2022-01-30 RX ADMIN — THIAMINE HCL TAB 100 MG 100 MG: 100 TAB at 13:03

## 2022-01-30 RX ADMIN — GABAPENTIN 300 MG: 300 CAPSULE ORAL at 13:03

## 2022-01-30 RX ADMIN — FAMOTIDINE 20 MG: 10 TABLET, FILM COATED ORAL at 08:02

## 2022-01-30 RX ADMIN — THIAMINE HCL TAB 100 MG 100 MG: 100 TAB at 08:02

## 2022-01-30 RX ADMIN — DICLOFENAC SODIUM 2 G: 10 GEL TOPICAL at 08:03

## 2022-01-30 RX ADMIN — Medication 5 MG: at 20:00

## 2022-01-30 RX ADMIN — Medication 1 TABLET: at 08:07

## 2022-01-30 RX ADMIN — PANTOPRAZOLE SODIUM 40 MG: 40 TABLET, DELAYED RELEASE ORAL at 08:07

## 2022-01-30 RX ADMIN — DICLOFENAC SODIUM 2 G: 10 GEL TOPICAL at 13:03

## 2022-01-30 RX ADMIN — SUCRALFATE 1 G: 1 SUSPENSION ORAL at 16:26

## 2022-01-30 RX ADMIN — POTASSIUM & SODIUM PHOSPHATES POWDER PACK 280-160-250 MG 2 PACKET: 280-160-250 PACK at 08:03

## 2022-01-30 RX ADMIN — SUCRALFATE 1 G: 1 SUSPENSION ORAL at 08:04

## 2022-01-30 RX ADMIN — MIDODRINE HYDROCHLORIDE 5 MG: 5 TABLET ORAL at 13:30

## 2022-01-30 RX ADMIN — THIAMINE HCL TAB 100 MG 100 MG: 100 TAB at 20:00

## 2022-01-30 RX ADMIN — GABAPENTIN 300 MG: 300 CAPSULE ORAL at 20:01

## 2022-01-30 RX ADMIN — NALTREXONE HYDROCHLORIDE 50 MG: 50 TABLET, FILM COATED ORAL at 08:04

## 2022-01-30 RX ADMIN — SODIUM CHLORIDE, POTASSIUM CHLORIDE, SODIUM LACTATE AND CALCIUM CHLORIDE 500 ML: 600; 310; 30; 20 INJECTION, SOLUTION INTRAVENOUS at 13:31

## 2022-01-30 RX ADMIN — DICLOFENAC SODIUM 2 G: 10 GEL TOPICAL at 16:26

## 2022-01-30 RX ADMIN — PANTOPRAZOLE SODIUM 40 MG: 40 TABLET, DELAYED RELEASE ORAL at 16:26

## 2022-01-30 RX ADMIN — BUSPIRONE HYDROCHLORIDE 30 MG: 30 TABLET ORAL at 08:02

## 2022-01-30 RX ADMIN — GABAPENTIN 600 MG: 300 CAPSULE ORAL at 05:36

## 2022-01-30 RX ADMIN — POTASSIUM & SODIUM PHOSPHATES POWDER PACK 280-160-250 MG 2 PACKET: 280-160-250 PACK at 20:00

## 2022-01-30 ASSESSMENT — ACTIVITIES OF DAILY LIVING (ADL)
ADLS_ACUITY_SCORE: 19
ADLS_ACUITY_SCORE: 16
ADLS_ACUITY_SCORE: 19
ADLS_ACUITY_SCORE: 16
ADLS_ACUITY_SCORE: 19
ADLS_ACUITY_SCORE: 16
ADLS_ACUITY_SCORE: 19
ADLS_ACUITY_SCORE: 16
ADLS_ACUITY_SCORE: 19

## 2022-01-30 NOTE — PROGRESS NOTES
[facility]    Progress Note - Medicine Service, MAROON TEAM 1       Date of Admission:  1/27/2022    Assessment & Plan        61 year old female h/o alcohol use disorder and hepatic steatosis, admitted for hypotension following several days of melena and coffee ground emesis, found to have leukocytosis with neutrophilic predominance, TIFFANY, myoglobinuria, and lactic acidosis that responded to IV fluids. Highest suspicion is for alcohol use limiting other PO intake resulting in gastritis, hypovolemia, and concern for rhabdomyolysis.     Changes Today:  - continue to hold PTA antihypertensives  - discontinued clonidine patch  - iron panel added on  - orthostatics ordered  - needs PT/OT eval    Hypotensive episodes  History of HTN  History of syncope  Pt noted to have low BP during admission. No other signs of sepsis. Pt reports she had syncopal episodes x2 in the past, was told she had low BP. Was attributed to anemia and low iron, prescribed iron. Pt was not told to discontinue her antihypertensives, however.  - orthostatics ordered  - continue to hold PTA amlodipine and atenolol  - discontinued clonidine patch 1/29  - iron panel added on   - encourage PO intake and hydration  - If + for orthostatics, consider further fluid bolus     Acute Toxic & Metabolic Encephalopathy, improving  Alcohol Use Disorder c/b withdrawal  Likely multifactorial including alcohol intoxication, benzodiazepines (given at other ED prior to arrival), and TIFFANY on CKD, now with possible component of alcohol withdrawal. Patient has a history of thiamine deficiency c/b neuropathy so could be underlying this as well. With unwitnessed falls and bruising on back concern for possible head trauma. Head CT negative for any intracranial pathology, but notable for mild generalized cerebral volume loss slightly greater than expected for age, so possible chronic component. Ammonia within normal limits and no  acute liver failure or cirrhosis, so unlikely HE.  - CIWA benzo-sparing protocol for high risk patients  - addiction medicine consulted, appreciate recs  - started on naltrexone 50 mg daily on 1/29  - Continue gabapentin taper  - Continue thiamine and folic acid supplementation  - PRN valium and haldol  - discontinued clonidine patch due to hypotensive episodes 1/29     UGIB secondary to severe esophagitis LA grade D  Acute on chronic iron deficiency anemia  C/f Hemolysis  - Patient presented for melena and coffee-ground emesis. Baseline Hgb appears to be 9-10, on presentation at 6.7. Most likely due to the UGIB secondary to EtOH induced esophagitis. Patient has a history of FLORI in the past for which she takes ferrous sulfate at home. Additionally, UA notable for myoglobinuria (moderate blood but <1 RBC), however workup suggests against rhabdomyolysis. This could be a component of hemolytic anemia given normocytic MCV, although unclear given normal indirect bilirubin. Patient on folic acid, so difficult to assess if there was macrocytic overlying microcytic anemia. B12 wnl and patient currently on folic acid supplementation.  - EGD performed at bedside, notable for esophagitis with no active bleed or fresh blood visible   - Continue protonix PO BID  - Continue famotidine every day  - Continue sucrafate BID  - Continue PTA ferrous sulfate  - Continue folic acid  - Can consider peripheral smear  - Chronic management:  -- Strict avoidance of EtOH, tobacco, NSAIDs   -- Upon discharge, outpatient EGD to follow up healing and assess for underlying Monahan's Esophagus      Acute Kidney Injury on CKD Stage 3, prerenal + heme-pigment induced,resolved  Unclear rate of rise with last Cr >3months ago. Prerenal most likely etiology with hyaline casts, GI losses, and likely poor PO intake. Intrarenal component possible given patient has moderate blood but little to no RBCs in urine, suggestive of possible myoglobinuria. CK wnl  and K/Phos both appear to be low, so rhabdomyolysis unlikely. However, there could be a component of hemolysis vs nephrotic syndrome. Patient has been referred to Nephrology in the past for workup of CKD3, however, pt has not seen them yet.  Cr 0.94 on 1/30  - Defer Renal US unless Cr trend does not improve with fluids and improved PO intake  - Daily BMP  - Strict I/O  - Nephrology referral on discharge     Concern for Sepsis, unknown infectious source  Possible that infection is etiology of hypotension and presentation rather than bleeding, particularly with leukocytosis.  Source unclear - with emesis concern for gastroenteritis, biliary etiology unlikely with normal bili and ALP, hepatitis unlikely with normal ALT/AST, pulmonary source possible particularly aspiration pneumonitis or pneumonia in setting of emesis and encephalopathy, additionally CXR notable for left basilar/retrocardiac atelectasis but could not rule out superimposed pneumonia. Procal elevated at 0.23 and CRP elevated at 29. Blood cultures NGTD.  - Given stability and lack of respiratory symptoms, hold off on antibiotics for now.  - Trend procal, CRP, and CBC     Malnutrition  - Regular diet  - Nutrition consult     Hypophosphatemia   Hypokalemia  Hypomagnesemia  - High risk for refeeding syndrome once taking PO  - High replacement protocols for K, Mg, and Phos  - Daily BMP, Mg, Phos     Hepatic steatosis 2/2 alcohol use  Increased hepatic echogenecity seen on US in 2015, no abdominal imaging since that time. Hepatitis screened for in the past normal.   - Trend LFTs     Essential Hypertension  - Hold PTA atenolol  - Hold PTA amlodipine 2.5mg daily      GERD  - Hold PTA mylanta, tums, omeprazole while acute and on protonix and famotidine     Generalized Anxiety Disorder  Panic Disorder  - Continue PTA buspirone 30 mg BID  - Continue PTA hydroxyzine 25-50 mg Q8H PRN     Chronic Pain Syndrome  - Continue PTA PRN diclofenac gel  - Hold PTA  cyclobenzaprine, not reconciled  - Hold PTA vicodin (avoiding APAP in hepatic steatosis)  - Discontinue oxycodone, starting naltrexone and patient not using         Diet: Advance Diet as Tolerated: Regular Diet Adult    DVT Prophylaxis: Pneumatic Compression Devices  Soto Catheter: Not present  Fluids: None  Central Lines: None  Cardiac Monitoring: ACTIVE order. Indication: ICU  Code Status: Full Code      Disposition Plan   Expected Discharge: TBD 2-3 days pending PT/OT eval, resolution of encephalopathy and stable blood pressure  Anticipated discharge location: home         The patient's care was discussed with the Attending Physician, Dr. Leyva.    Mi Tejeda MD  Medicine Service, MAROON TEAM [address] [place] [address]  Securely message with the Vocera Web Console (learn more here)  Text page via [employer] Paging/Directory   Please see signed in provider for up to date coverage information      Clinically Significant Risk Factors Present on Admission   # Severe Malnutrition: based on nutrition assessment     ______________________________________________________________________    Interval History   NAEON. Pt was intermittently hypotensive, received additional 250 mL of bolus by night team. Denies chest pain, dyspnea, cough, abdominal pain, dizziness, headache, blurry vision.     Patient states she has had syncopal episodes in the past, was told she had low blood pressure. However, her antihypertensives were not discontinued.     ROS negative otherwise noted on interval history.     Data reviewed today: I reviewed all medications, new labs and imaging results over the last 24 hours. I personally reviewed no images or EKG's today.    Physical Exam   Vital Signs: Temp: 98.4  F (36.9  C) Temp src: Oral BP: (!) 82/59 Pulse: 73   Resp: 13 SpO2: (!) 86 % O2 Device: None (Room air)    Weight: 130 lbs 11.72 oz  Constitutional: awake, alert, cooperative, no apparent distress, and appears  stated age  Eyes: EOMI, sclera anicteric, conjunctiva normal  Respiratory: No increased work of breathing, good air exchange, clear to auscultation bilaterally, no crackles or wheezing  Cardiovascular: regular rate and rhythm, normal S1 and S2, no murmur noted and no edema  GI: normal bowel sounds, soft and non-distended  Skin: no bruising or bleeding  Musculoskeletal: no lower extremity pitting edema present  Neurologic: Mental Status Exam:  Level of Alertness:   awake  Orientation:   person, place, time  Neuropsychiatric: General: normal, calm and normal eye contact  Level of consciousness: alert / normal    Data   Recent Labs   Lab 01/30/22  0459 01/29/22  0442 01/28/22  0506 01/26/22  2351 01/26/22  1852   WBC 5.1 6.1 9.3   < > 16.5*   HGB 9.5* 10.0* 11.1*   < > 9.2*    97 92   < > 99   * 124* 149*   < > 303   INR  --   --  1.14  --  1.28*    135 134  134   < > 128*   POTASSIUM 4.1 3.7 3.4  3.4   < > 3.5   CHLORIDE 104 100 99  100   < > 80*   CO2 26 28 28  29   < > 27   BUN 24 25 42*  42*   < > 109*   CR 0.94 1.09* 1.16*  1.13*   < > 2.01*   ANIONGAP 5 7 7  5   < > 21*   SUE 8.1* 8.5 8.3*  8.6   < > 11.6*   * 114* 119*  117*   < > 145*   ALBUMIN 2.0* 2.2* 2.4*   < > 3.3*   PROTTOTAL 5.1* 5.2* 5.7*   < > 7.3   BILITOTAL 0.5 0.9 1.0   < > 0.9   ALKPHOS 100 73 70   < > 97   ALT 42 41 52*   < > 24   AST 41 48* 94*   < > 31    < > = values in this interval not displayed.

## 2022-01-30 NOTE — PLAN OF CARE
Major Shift Events: M/S status, awaiting bed. Given hypotension in afternoon and evening, clonidine patch removed and 250 mL LR given at 2000 with good response. Remains vitally stable on room air. Continent of urine and stool. Bed alarm on for slight impulsivity. Phos replacement ordered per protocol.     Plan: Transfer to floor when bed becomes available. Recheck phos in AM.     For vital signs and complete assessments, please see documentation flowsheets.

## 2022-01-30 NOTE — PLAN OF CARE
ICU End of Shift Summary. See flowsheets for vital signs and detailed assessment.    Changes this shift: Patient BP soft throughout shift. Responded well (temporarily) to fluid bolus (1L LR) but has since declined. She remains afebrile, maintaining sats on room air, A&Ox4.    Plan: Continue to monitor patient status; notify physician of changes.

## 2022-01-30 NOTE — PLAN OF CARE
Transferred to:  at 1720, Report given to MACY Flannery  Status at time of transfer: On RA w/ clear-diminished lung sounds, vital signs stable, but soft BP; LR bolus 500 ml given, started on Midodrine 5 mg three times a/day with improvement in BP.  Heavy assist of 1 w/gait belt and Solomon. H/o FALLS.  Belongings: sent w/ pt one bag of belongings  Soto removed? (if no, why?): N/A  Chart and medications: sent w/pt  Family notified:  N/A

## 2022-01-31 ENCOUNTER — APPOINTMENT (OUTPATIENT)
Dept: OCCUPATIONAL THERAPY | Facility: CLINIC | Age: 62
DRG: 380 | End: 2022-01-31
Attending: INTERNAL MEDICINE
Payer: COMMERCIAL

## 2022-01-31 ENCOUNTER — APPOINTMENT (OUTPATIENT)
Dept: PHYSICAL THERAPY | Facility: CLINIC | Age: 62
DRG: 380 | End: 2022-01-31
Attending: INTERNAL MEDICINE
Payer: COMMERCIAL

## 2022-01-31 LAB
ALBUMIN SERPL-MCNC: 2.1 G/DL (ref 3.4–5)
ALP SERPL-CCNC: 97 U/L (ref 40–150)
ALT SERPL W P-5'-P-CCNC: 37 U/L (ref 0–50)
ANION GAP SERPL CALCULATED.3IONS-SCNC: 6 MMOL/L (ref 3–14)
AST SERPL W P-5'-P-CCNC: 29 U/L (ref 0–45)
BILIRUB SERPL-MCNC: 0.4 MG/DL (ref 0.2–1.3)
BUN SERPL-MCNC: 18 MG/DL (ref 7–30)
CALCIUM SERPL-MCNC: 8.6 MG/DL (ref 8.5–10.1)
CHLORIDE BLD-SCNC: 105 MMOL/L (ref 94–109)
CO2 SERPL-SCNC: 24 MMOL/L (ref 20–32)
CREAT SERPL-MCNC: 0.95 MG/DL (ref 0.52–1.04)
ERYTHROCYTE [DISTWIDTH] IN BLOOD BY AUTOMATED COUNT: 14.9 % (ref 10–15)
GFR SERPL CREATININE-BSD FRML MDRD: 67 ML/MIN/1.73M2
GLUCOSE BLD-MCNC: 99 MG/DL (ref 70–99)
HBV SURFACE AG SERPL QL IA: NONREACTIVE
HCT VFR BLD AUTO: 32 % (ref 35–47)
HGB BLD-MCNC: 10.2 G/DL (ref 11.7–15.7)
MAGNESIUM SERPL-MCNC: 1.8 MG/DL (ref 1.6–2.3)
MCH RBC QN AUTO: 31.9 PG (ref 26.5–33)
MCHC RBC AUTO-ENTMCNC: 31.9 G/DL (ref 31.5–36.5)
MCV RBC AUTO: 100 FL (ref 78–100)
PHOSPHATE SERPL-MCNC: 3 MG/DL (ref 2.5–4.5)
PLATELET # BLD AUTO: 223 10E3/UL (ref 150–450)
POTASSIUM BLD-SCNC: 4.3 MMOL/L (ref 3.4–5.3)
PROT SERPL-MCNC: 5.6 G/DL (ref 6.8–8.8)
RBC # BLD AUTO: 3.2 10E6/UL (ref 3.8–5.2)
SODIUM SERPL-SCNC: 135 MMOL/L (ref 133–144)
WBC # BLD AUTO: 6.5 10E3/UL (ref 4–11)

## 2022-01-31 PROCEDURE — 250N000013 HC RX MED GY IP 250 OP 250 PS 637: Performed by: HOSPITALIST

## 2022-01-31 PROCEDURE — 36415 COLL VENOUS BLD VENIPUNCTURE: CPT | Performed by: STUDENT IN AN ORGANIZED HEALTH CARE EDUCATION/TRAINING PROGRAM

## 2022-01-31 PROCEDURE — 250N000013 HC RX MED GY IP 250 OP 250 PS 637: Performed by: INTERNAL MEDICINE

## 2022-01-31 PROCEDURE — 84100 ASSAY OF PHOSPHORUS: CPT | Performed by: NURSE PRACTITIONER

## 2022-01-31 PROCEDURE — 99207 PR APP CREDIT; MD BILLING SHARED VISIT: CPT | Performed by: INTERNAL MEDICINE

## 2022-01-31 PROCEDURE — 250N000013 HC RX MED GY IP 250 OP 250 PS 637: Performed by: STUDENT IN AN ORGANIZED HEALTH CARE EDUCATION/TRAINING PROGRAM

## 2022-01-31 PROCEDURE — 97530 THERAPEUTIC ACTIVITIES: CPT | Mod: GO

## 2022-01-31 PROCEDURE — 258N000003 HC RX IP 258 OP 636

## 2022-01-31 PROCEDURE — 250N000011 HC RX IP 250 OP 636: Performed by: HOSPITALIST

## 2022-01-31 PROCEDURE — 250N000013 HC RX MED GY IP 250 OP 250 PS 637: Performed by: NURSE PRACTITIONER

## 2022-01-31 PROCEDURE — 250N000013 HC RX MED GY IP 250 OP 250 PS 637

## 2022-01-31 PROCEDURE — 97535 SELF CARE MNGMENT TRAINING: CPT | Mod: GO

## 2022-01-31 PROCEDURE — 97530 THERAPEUTIC ACTIVITIES: CPT | Mod: GP

## 2022-01-31 PROCEDURE — 99232 SBSQ HOSP IP/OBS MODERATE 35: CPT | Mod: GC | Performed by: HOSPITALIST

## 2022-01-31 PROCEDURE — 85027 COMPLETE CBC AUTOMATED: CPT | Performed by: STUDENT IN AN ORGANIZED HEALTH CARE EDUCATION/TRAINING PROGRAM

## 2022-01-31 PROCEDURE — 83735 ASSAY OF MAGNESIUM: CPT | Performed by: NURSE PRACTITIONER

## 2022-01-31 PROCEDURE — 120N000002 HC R&B MED SURG/OB UMMC

## 2022-01-31 PROCEDURE — 80053 COMPREHEN METABOLIC PANEL: CPT | Performed by: STUDENT IN AN ORGANIZED HEALTH CARE EDUCATION/TRAINING PROGRAM

## 2022-01-31 PROCEDURE — 97165 OT EVAL LOW COMPLEX 30 MIN: CPT | Mod: GO

## 2022-01-31 RX ORDER — MIRTAZAPINE 15 MG/1
15 TABLET, ORALLY DISINTEGRATING ORAL AT BEDTIME
Status: DISCONTINUED | OUTPATIENT
Start: 2022-01-31 | End: 2022-02-02 | Stop reason: HOSPADM

## 2022-01-31 RX ORDER — SODIUM CHLORIDE, SODIUM LACTATE, POTASSIUM CHLORIDE, CALCIUM CHLORIDE 600; 310; 30; 20 MG/100ML; MG/100ML; MG/100ML; MG/100ML
INJECTION, SOLUTION INTRAVENOUS CONTINUOUS
Status: ACTIVE | OUTPATIENT
Start: 2022-01-31 | End: 2022-01-31

## 2022-01-31 RX ORDER — MIDODRINE HYDROCHLORIDE 5 MG/1
5 TABLET ORAL 2 TIMES DAILY
Status: DISCONTINUED | OUTPATIENT
Start: 2022-01-31 | End: 2022-02-01

## 2022-01-31 RX ORDER — HYDROXYZINE HYDROCHLORIDE 25 MG/1
25 TABLET, FILM COATED ORAL EVERY 8 HOURS
Status: DISCONTINUED | OUTPATIENT
Start: 2022-01-31 | End: 2022-02-02

## 2022-01-31 RX ORDER — GABAPENTIN 100 MG/1
100 CAPSULE ORAL EVERY 8 HOURS
Status: DISCONTINUED | OUTPATIENT
Start: 2022-02-03 | End: 2022-01-31

## 2022-01-31 RX ORDER — GABAPENTIN 300 MG/1
300 CAPSULE ORAL EVERY 8 HOURS
Status: DISCONTINUED | OUTPATIENT
Start: 2022-01-31 | End: 2022-01-31

## 2022-01-31 RX ORDER — GABAPENTIN 300 MG/1
300 CAPSULE ORAL EVERY 8 HOURS
Status: DISCONTINUED | OUTPATIENT
Start: 2022-01-31 | End: 2022-02-02 | Stop reason: HOSPADM

## 2022-01-31 RX ORDER — MAGNESIUM SULFATE HEPTAHYDRATE 40 MG/ML
2 INJECTION, SOLUTION INTRAVENOUS ONCE
Status: COMPLETED | OUTPATIENT
Start: 2022-01-31 | End: 2022-01-31

## 2022-01-31 RX ADMIN — GABAPENTIN 300 MG: 300 CAPSULE ORAL at 19:36

## 2022-01-31 RX ADMIN — MIDODRINE HYDROCHLORIDE 5 MG: 5 TABLET ORAL at 08:12

## 2022-01-31 RX ADMIN — FAMOTIDINE 20 MG: 10 TABLET, FILM COATED ORAL at 08:11

## 2022-01-31 RX ADMIN — Medication 1 TABLET: at 08:11

## 2022-01-31 RX ADMIN — ACETAMINOPHEN 650 MG: 325 TABLET, FILM COATED ORAL at 11:24

## 2022-01-31 RX ADMIN — HYDROXYZINE HYDROCHLORIDE 25 MG: 25 TABLET, FILM COATED ORAL at 19:36

## 2022-01-31 RX ADMIN — SODIUM CHLORIDE, POTASSIUM CHLORIDE, SODIUM LACTATE AND CALCIUM CHLORIDE: 600; 310; 30; 20 INJECTION, SOLUTION INTRAVENOUS at 13:10

## 2022-01-31 RX ADMIN — FERROUS SULFATE TAB 325 MG (65 MG ELEMENTAL FE) 325 MG: 325 (65 FE) TAB at 08:12

## 2022-01-31 RX ADMIN — GABAPENTIN 300 MG: 300 CAPSULE ORAL at 04:07

## 2022-01-31 RX ADMIN — DICLOFENAC SODIUM 2 G: 10 GEL TOPICAL at 16:59

## 2022-01-31 RX ADMIN — SUCRALFATE 1 G: 1 SUSPENSION ORAL at 16:55

## 2022-01-31 RX ADMIN — BUSPIRONE HYDROCHLORIDE 30 MG: 30 TABLET ORAL at 08:16

## 2022-01-31 RX ADMIN — NALTREXONE HYDROCHLORIDE 50 MG: 50 TABLET, FILM COATED ORAL at 08:15

## 2022-01-31 RX ADMIN — DICLOFENAC SODIUM 2 G: 10 GEL TOPICAL at 11:27

## 2022-01-31 RX ADMIN — FOLIC ACID 1 MG: 1 TABLET ORAL at 08:12

## 2022-01-31 RX ADMIN — BUSPIRONE HYDROCHLORIDE 30 MG: 30 TABLET ORAL at 19:36

## 2022-01-31 RX ADMIN — Medication 5 MG: at 19:37

## 2022-01-31 RX ADMIN — HYDROXYZINE HYDROCHLORIDE 25 MG: 25 TABLET, FILM COATED ORAL at 13:05

## 2022-01-31 RX ADMIN — DICLOFENAC SODIUM 2 G: 10 GEL TOPICAL at 08:16

## 2022-01-31 RX ADMIN — SODIUM CHLORIDE, POTASSIUM CHLORIDE, SODIUM LACTATE AND CALCIUM CHLORIDE: 600; 310; 30; 20 INJECTION, SOLUTION INTRAVENOUS at 13:05

## 2022-01-31 RX ADMIN — THIAMINE HCL TAB 100 MG 100 MG: 100 TAB at 08:11

## 2022-01-31 RX ADMIN — MAGNESIUM SULFATE IN WATER 2 G: 40 INJECTION, SOLUTION INTRAVENOUS at 11:43

## 2022-01-31 RX ADMIN — PANTOPRAZOLE SODIUM 40 MG: 40 TABLET, DELAYED RELEASE ORAL at 16:55

## 2022-01-31 RX ADMIN — THIAMINE HCL TAB 100 MG 100 MG: 100 TAB at 19:36

## 2022-01-31 RX ADMIN — PANTOPRAZOLE SODIUM 40 MG: 40 TABLET, DELAYED RELEASE ORAL at 08:12

## 2022-01-31 RX ADMIN — THIAMINE HCL TAB 100 MG 100 MG: 100 TAB at 13:05

## 2022-01-31 RX ADMIN — SUCRALFATE 1 G: 1 SUSPENSION ORAL at 08:12

## 2022-01-31 RX ADMIN — GABAPENTIN 300 MG: 300 CAPSULE ORAL at 11:24

## 2022-01-31 RX ADMIN — MIRTAZAPINE 15 MG: 15 TABLET, ORALLY DISINTEGRATING ORAL at 19:35

## 2022-01-31 ASSESSMENT — ACTIVITIES OF DAILY LIVING (ADL)
ADLS_ACUITY_SCORE: 16
ADLS_ACUITY_SCORE: 16
ADLS_ACUITY_SCORE: 14
ADLS_ACUITY_SCORE: 15
ADLS_ACUITY_SCORE: 16
ADLS_ACUITY_SCORE: 16
ADLS_ACUITY_SCORE: 14
ADLS_ACUITY_SCORE: 15
ADLS_ACUITY_SCORE: 16
ADLS_ACUITY_SCORE: 16
DEPENDENT_IADLS:: CLEANING;COOKING;LAUNDRY
ADLS_ACUITY_SCORE: 15
ADLS_ACUITY_SCORE: 16
ADLS_ACUITY_SCORE: 16
ADLS_ACUITY_SCORE: 14
ADLS_ACUITY_SCORE: 16
ADLS_ACUITY_SCORE: 14

## 2022-01-31 NOTE — PLAN OF CARE
Transferred from .  Admitted for hypotension and GI bleed.  Afebrile, HTN, OVSS on RA.  Alert and oriented x3, not sure of date.  Hard of hearing.  Denies pain and nausea.  L PIV SL.  Up with heavy assist of 1 and gait belt.  Continue plan of care.

## 2022-01-31 NOTE — CONSULTS
Care Management Initial Consult    General Information  Assessment completed with: Patient,Spouse or significant other,VM-chart review, Mihaela Vance and S/O, Jeremy  Type of CM/SW Visit: Initial Assessment    Primary Care Provider verified and updated as needed:Yes     [facility]  Kylah Sánchez  [address] [place] [address]   PH: [phone]    Readmission within the last 30 days: no previous admission in last 30 days      Reason for Consult: discharge planning  Advance Care Planning: Advance Care Planning Reviewed: education/resources on health care directives provided (SW provided HCD form for pt to fill-out.)          Communication Assessment  Patient's communication style: spoken language (English or Bilingual)    Hearing Difficulty or Deaf: yes   Wear Glasses or Blind: other (see comments) (pt unable to answer questions; semicomatose)    Cognitive  Cognitive/Neuro/Behavioral: WDL  Level of Consciousness: alert  Arousal Level: opens eyes spontaneously  Orientation: oriented x 4  Mood/Behavior: calm,cooperative,behavior appropriate to situation  Best Language: 0 - No aphasia  Speech: slow,logical,spontaneous    Living Environment:   People in home: significant other   (Jeremy)  Current living Arrangements: house      Able to return to prior arrangements: yes       Family/Social Support:  Care provided by: self,spouse/significant other  Provides care for: no one, unable/limited ability to care for self  Marital Status: Domestic Partnership (Jeremy Nieto)  Significant Other, Friends       Description of Support System: Supportive,Involved    Support Assessment: Adequate family and caregiver support,Adequate social supports    Current Resources:   Patient receiving home care services: No     Community Resources: None  Equipment currently used at home: cane, straight; FWW, 4WW, shower chair.  Supplies currently used at home: None    Employment/Financial:  Employment Status: disabled from 2014 care  accident, which resulted in 3 herniated discs; on SSDI.     Financial Concerns: No concerns identified           Lifestyle & Psychosocial Needs:  Social Determinants of Health     Tobacco Use: Low Risk      Smoking Tobacco Use: Never Smoker     Smokeless Tobacco Use: Never Used   Alcohol Use: Not on file   Financial Resource Strain: Not on file   Food Insecurity: Not on file   Transportation Needs: Not on file   Physical Activity: Not on file   Stress: Not on file   Social Connections: Not on file   Intimate Partner Violence: Not on file   Depression: Not at risk     PHQ-2 Score: 0   Housing Stability: Not on file       Functional Status:  Prior to admission patient needed assistance:   Dependent ADLs:: Ambulation-walker (as needed)  Dependent IADLs:: Cleaning,Cooking,Laundry       Mental Health Status:  Mental Health Status: Past Concern  Mental Health Management: Medication managed by PCP.   Pt reports that her MH has improved since taking buspar, which significantly decreased her nausea/vomitting caused by anxiety.     Chemical Dependency Status:  Chemical Dependency Status: Current Concern  Chemical Dependency Management: Other (see comment) (Pt is open to OP CD Tx)          Values/Beliefs:  Spiritual, Cultural Beliefs, Mandaen Practices, Values that affect care: no               Additional Information:  Mihaela Vance is a 61 year old female with a PMHx that includes depression, anxiety, long history of alcohol use, and prior admissions for humeral fracture and anemia, admitted to the ICU for shock due to upper GI bleed and alcohol withdrawal, found to have significant esophagitis.    SW met with pt at bedside for initial assessment and to introduce self/role. Pt lives with her long-term S/O, Jeremy, in a split-level house in [place], [place]; 2STE, 12 steps to go upstairs, 8 steps to go downstairs. At baseline, pt was independent with bathing (bed baths), dressing, medication and financial management; was able  "to do stairs with railing; drove self. Jeremy assists with housekeeping, laundry, meal prep, and outside work. Jeremy works in [place] full-time from 7:30 a.m. to 4:00 p.m. Pt will utilize a FWW and 4WW as needed (at night mostly); shower chair and straight cane also in the home.    SW spoke to Jeremy on the phone, verified information given by pt. Jeremy expressed his concerns that pt will start drinking again and cause another hospitalization. Jeremy states that pt would improve functionally if she did not drink and ate healthier. Jeremy espressed understanding that quitting drinking is something that pt has to decide for herself. SW told Jeremy that today pt reports she is still interested in OP CD Tx. Pt also states that she is NOT interested in TCU. Pt is open to HH \"if they're forcing me.\" Jeremy stated that he would be able to stay home with pt  for the first few days upon discharge.    SW/RNCC to continue to follow for discharge planning.      Neetu Aceves, LINNW, LSW  [facility]   Pager: [phone]        "

## 2022-01-31 NOTE — PLAN OF CARE
Reason for admission: hypotension following several days of melena and coffee ground emesis. Transferred out of ICU today    Vitals:   BP (!) 145/95 (BP Location: Left arm)   Pulse 70   Temp 98.4  F (36.9  C) (Oral)   Resp 16   Wt 59.3 kg (130 lb 11.7 oz)   LMP 09/16/2006 (Exact Date)   SpO2 100%   BMI 19.88 kg/m      Activity: bedrest this shift. Ambulated with walker and gait belt and assist x2. Ataxic gait.  Bed Alarm ON otherwise.    Pain: sleeping on assessment. Denies pain.    Neuro: alert and oriented to person, place, time (situation not assessed). No focal deficits. CIWA 0 at midnight     Cardiac: S1S2, no murmur detected    Respiratory: Denies feeling SOB, no cough noted. Regular respiratory rate and effort. Lung sounds clear to auscultation bilaterally.    GI/: denies nausea. Voided spontaneously x1 and without incontinence.     Lines: left arm peripheral IV saline locked.    Wounds/Incisions: non noted    Labs/imaging/Consults: AM labs    Discharge Plan: pending

## 2022-01-31 NOTE — PROGRESS NOTES
"[facility]     Addiction Progress Note - Addiction Service        Date of Admission:  1/27/2022  Assessment & Plan         Ms. Vance  has a PMHx that includes depression, anxiety, long history of alcohol use, and prior admissions for humeral fracture and anemia, admitted to the ICU for shock due to upper GI bleed and alcohol withdrawal, found to have significant esophagitis.     # Severe Alcohol Use Disorder, leading to major medical complications including liver injury, GI bleed, falls with fracture, as well as legal issues (prior DWI)  #Alcohol Withdrawal  She is a bit vague in goals; she appears to be aware that her alcohol use is leading to major negative consequences medically and socially, but does continue to downplay alcohol's role.  She definitely would like to cut down; she is worried that quiting alcohol completely will lead to worsening mental health.  Triggers to drink include self-medicating alcohol and depression, but also other life stressors and pain. Continued management of anxiety and depression will aid her in cutting down/avoidance of alcohol.    - We discussed different options to maintain goal of sobriety, including medications.  She would be an excellent candidate for oral naltrexone.      - Continue naltrexone 50 mg PO daily  - Discussed with team and patient, continue gabapentin 300 mg PO TID, may consider scheduling hydroxyzine and assess effects on anxiety  - Discussed with patient about selective serotonin reuptake inhibitor for anxiety, she reports \"anaphylaxis-like reaction\" to sertraline (confirmed on chart review) and not interested in SSRIs.  - Patient expressed no intreinpatient/outpatient chemical dependency program. Patient recommended TCU and will need further exploration of treatment options after TCU.     # Mental health: consider psych consult vs having primary care work on resources for mental health support.  - consider " scheduling low dose hydroxyzine, 25 mg daily to BID for anxiety  - continue buspar 30 mg PO BID  - continue gabapentin 300 mg PO TID    # Immunization review: Hep A and Hep B not noted in [facility].    - Not immune to HepA, recommend HepA vaccination prior to discharge  - Hep B surface antibody present, surface antigen still pending.     # Peer Support:   -Our per  will meet the patient if agreeable and still hospitalized on Thursday, to provide additional outpatient resources  -To contact Zoey Peer  from [facility] ([facility]): call or text: [phone]     # Addiction Social Worker:   Our addiction social worker Maury Hermilo can be contacted on her pager [phone] or texted/called at [phone]  --Patient is interested in ONLY outpatient addiction treatment after discharge.  Our addiction social worker will give the patient appropriate resources for outpatient evaluation if still here Monday     # Linkage to Care:   PCP: Kylah Sánchez, [facility] clinic: will [facility] re: mental health, naltrexone, and addiction support     The patient's care was discussed with the Bedside Nurse, Care Coordinator/, Patient, Patient's Family and Primary team.     Thank you for the consult, we will continue to follow.    Disposition Plan   Expected discharge: 2 - 3 days, recommended to transitional care unit once blood pressure within goal.  Entered: Zeke De Anda MD 02/03/2021, 9:22 AM       The patient's care was discussed with the Attending Physician, Dr. Zeke De Anda MD.    Time Spent on this Encounter   I spent >35 minutes on the unit/floor managing the care of Mihaela Vance. Over 50% of my time was spent on the following:   - Counseling the patient and/or family regarding: risks and benefits of treatment options  - Coordination of care with the: care coordinator/    Alfredo Newman MD  Addiction Service   [facility]  [place]   [phone]  Contact information available via [facility] Paging/Directory    ChAT team (Addiction Consult Team): Coverage Monday-Friday 8-4pm    Provider (Pager)  (Pager)   Hui De Anda [phone]   Tues Dr. Zeke De Anda [address] Ma[phone]   Wed Dr. Zeke De Anda [phone] None   Thurs Dr. Vernon Bradshaw [phone] Maury Akhtar [phone]   Fri Dr. Bnog Selby [phone] Maury Akhtar [phone]   Sat-[place] [facility] Team- Refer to [facility].  For urgent needs, please place a  consult for psychiatry. None     ______________________________________________________________________    Interval History   Episodes of hypotension overnight. Reports decreased cravings. Worried about her  at home.    Addiction Consult team Handoff:  Drug of choice: ETOH  MAT: naltrexone, gabapentin, buspar, hydroxyzine  housing status: Stable housing  transportation status: Available  significant cultural identities: none  Where patient lives/what town: [place], [place]  What discharge meds are needed, have they been ordered, who is outpatient provider to follow up?: Gabapentin, atarax    Data reviewed today: I reviewed all medications, new labs and imaging results over the last 24 hours. I personally reviewed no images or EKG's today.    Physical Exam   Vital Signs: Temp: 99.5  F (37.5  C) Temp src: Oral BP: 94/63 Pulse: 108   Resp: 16 SpO2: 96 % O2 Device: Room air    Weight: 185 lbs 6.51 oz  General Appearance: A&Ox3, NAD  Respiratory: CTAB on room air  Cardiovascular: S1 S2 tachycardic, no m/g/r  GI: Soft, NTTP, no ascites  Extremities: No peripheral edema, warm to touch    Due to regulation of Title 42 of the Code of Federal Regulations (CFR) Part 2: Confidentiality laws apply to this note and the information wherein.  Thus, this note cannot be copy and pasted into any other health care staff's note nor can it be included in general medical records sent to ANY outside agency without the patient's written consent.    Addiction Medicine Staff  Addendum  Date of Service: 1/31/2022  I have seen and examined Mihaela Vance with the resident team, reviewed the data and discussed the plan of care with the patient and the care team on P&FC Rounds.  I agree with the above documentation     I discussed pt's care with bedside RN, case management/social work today.  I personally reviewed labs, medications and past 24 hr notes.  Assessment/Plan/Diagnoses: plan/dx as above, which contains my edits and reflects our joint medical decision-making.     Zeke De Anda MD  Addiction Medicine   of Internal Medicine and Pediatrics  [facility]  Pager: [phone]

## 2022-01-31 NOTE — PLAN OF CARE
Neuro: Pt alert and oriented x4  Cardiac:WNL   Respiratory: Denies SOB.  GI/: Denies nausea  Diet/appetite: Regular, ate 75% breakfast.  Activity: Ambulated with assist of 1 in the hallway.  Pain: Received tylenol prn  Skin: WNL.  Lines: PIV.    /67 (BP Location: Left arm)   Pulse 76   Temp (!) 95.2  F (35.1  C) (Oral)   Resp 16   Wt 59.3 kg (130 lb 11.7 oz)   LMP 09/16/2006 (Exact Date)   SpO2 95%   BMI 19.88 kg/m

## 2022-01-31 NOTE — PROGRESS NOTES
"   01/31/22 1142   Quick Adds   Type of Visit Initial Occupational Therapy Evaluation   Living Environment   People in home significant other   Current Living Arrangements house   Home Accessibility stairs to enter home;stairs within home;other (see comments)  (tub/shower)   Living Environment Comments Pt reports living with  at baseline, however, pt presenting with some confusion.   Self-Care   Usual Activity Tolerance moderate   Current Activity Tolerance fair   Equipment Currently Used at Home walker, rolling;shower chair   Activity/Exercise/Self-Care Comment Pt reports having access to a rolling walker if needed, but doesn't typically use gait aid.   Instrumental Activities of Daily Living (IADL)   IADL Comments Pt reports  has been assisting with majority of IADL recently. Prior, pt reports being \"independent with all that\"   Disability/Function   Hearing Difficulty or Deaf yes   Concentrating, Remembering or Making Decisions Difficulty yes   Difficulty Communicating no   Difficulty Eating/Swallowing no   Walking or Climbing Stairs Difficulty yes   Dressing/Bathing Difficulty no   Toileting issues no   Doing Errands Independently Difficulty (such as shopping) yes   Fall history within last six months yes   Change in Functional Status Since Onset of Current Illness/Injury yes   General Information   Onset of Illness/Injury or Date of Surgery 01/27/22   Referring Physician Mi Tejeda MD   Patient/Family Therapy Goal Statement (OT) return home ASAP   Additional Occupational Profile Info/Pertinent History of Current Problem 61 year old female h/o alcohol use disorder and hepatic steatosis, admitted for hypotension following several days of melena and coffee ground emesis, found to have leukocytosis with neutrophilic predominance, TIFFANY, myoglobinuria, and lactic acidosis that responded to IV fluids. Highest suspicion is for alcohol use limiting other PO intake resulting in gastritis, hypovolemia, " and concern for rhabdomyolysis.   Existing Precautions/Restrictions fall   General Observations and Info Pt supine in bed upon arrival, agreeable to therapy.   Cognitive Status Examination   Orientation Status person;place   Affect/Mental Status (Cognitive) confused   Cognitive Status Comments Per chart review, pt with some confusion.   Pain Assessment   Patient Currently in Pain No   Range of Motion Comprehensive   General Range of Motion no range of motion deficits identified   Clinical Impression   Criteria for Skilled Therapeutic Interventions Met (OT) yes   OT Diagnosis OT order for disposition   OT Problem List-Impairments impacting ADL problems related to;activity tolerance impaired;cognition;strength   Assessment of Occupational Performance 1-3 Performance Deficits   Planned Therapy Interventions (OT) ADL retraining;cognition;strengthening;transfer training;home program guidelines;progressive activity/exercise   Clinical Decision Making Complexity (OT) low complexity   Therapy Frequency (OT) 5x/week   Predicted Duration of Therapy 1 week   Anticipated Equipment Needs Upon Discharge (OT)   (TBD with further therapy)   Risk & Benefits of therapy have been explained evaluation/treatment results reviewed   OT Discharge Planning    OT Discharge Recommendation (DC Rec) Transitional Care Facility;home with home care occupational therapy   OT Rationale for DC Rec Pt would benefit from TCU to increase overall activity tolerance and strength, however, if pt return directly home would recommend 24 hour supervision initially and assist x1 for all ADL/IADL and home therapy for safety evaluation.   OT Brief overview of current status  Ax1 + FWW for mobility ~150 feet   Total Evaluation Time (Minutes)   Total Evaluation Time (Minutes) 5

## 2022-01-31 NOTE — PROGRESS NOTES
"   01/30/22 1600   Quick Adds   Type of Visit Initial PT Evaluation   Living Environment   People in home significant other   Current Living Arrangements house   Home Accessibility stairs to enter home;stairs within home   Number of Stairs, Main Entrance   (\" a few\")   Number of Stairs, Within Home, Primary greater than 10 stairs  (5 down or 8 up to mainfloor)   Stair Railings, Within Home, Primary railings on both sides of stairs   Transportation Anticipated family or friend will provide   Living Environment Comments Pt reports she was I at home with wh walker recently, states I with ADLs leading up to hospitalization. unclear how accurate pt is as historian, answers differ at times and pt presents with some confusion.   Self-Care   Usual Activity Tolerance moderate   Current Activity Tolerance poor   Equipment Currently Used at Home walker, rolling  (\"off and on\")   Disability/Function   Hearing Difficulty or Deaf yes   Patient's preferred means of communication English speaker with hearing loss, no speech problems.   Concentrating, Remembering or Making Decisions Difficulty other (see comments)  (current difficulty, unclear re previous)   Difficulty Eating/Swallowing no   Walking or Climbing Stairs Difficulty yes   Walking or Climbing Stairs ambulation difficulty, requires equipment   Dressing/Bathing Difficulty no   Toileting issues no   Fall history within last six months yes   Number of times patient has fallen within last six months   (\"a few\")   Change in Functional Status Since Onset of Current Illness/Injury yes   General Information   Onset of Illness/Injury or Date of Surgery 01/27/22   Referring Physician Nic Leyva MD   Patient/Family Therapy Goals Statement (PT) return home   Pertinent History of Current Problem (include personal factors and/or comorbidities that impact the POC) 61 year old female h/o alcohol use disorder and hepatic steatosis, admitted for hypotension following several days of " melena and coffee ground emesis, found to have leukocytosis with neutrophilic predominance, TIFFANY, myoglobinuria, and lactic acidosis that responded to IV fluids. Highest suspicion is for alcohol use limiting other PO intake resulting in gastritis, hypovolemia, and concern for rhabdomyolysis.   Existing Precautions/Restrictions fall   General Observations Pt agreeable to PT, difficult to understand at times   Cognition   Orientation Status (Cognition) oriented to;person;place   Affect/Mental Status (Cognition) confused   Follows Commands (Cognition) follows one-step commands;25-49% accuracy;delayed response/completion;increased processing time needed;initiation impaired;physical/tactile prompts required;repetition of directions required;verbal cues/prompting required;unable/difficult to assess   Behavioral Issues overwhelmed easily   Safety Deficit (Cognition) severe deficit;awareness of need for assistance;insight into deficits/self-awareness;safety precautions awareness;safety precautions follow-through/compliance;moderate deficit;impulsivity;at risk behavior observed   Posture    Posture Forward head position;Protracted shoulders   Range of Motion (ROM)   ROM Comment B LEs wfl   Strength   Strength Comments B LEs decreased absed on increased rest in bed   Bed Mobility   Comment (Bed Mobility) min A sup>sit   Transfers   Transfer Safety Comments Heavy min/mod A sit<>stand   Gait/Stairs (Locomotion)   Comment (Gait/Stairs) Heavy min A HHA 4'   Balance   Balance Comments unsteady standing, sitting, standign better with B hand held A   Coordination   Coordination Comments impaired, tremor with intentional movements   Clinical Impression   Criteria for Skilled Therapeutic Intervention yes, treatment indicated   PT Diagnosis (PT) impaired functional mobility   Influenced by the following impairments decreased strength, balance, coordination   Functional limitations due to impairments  I with transfers, gait, bed  mob, barrier navigation, ADLs   Clinical Presentation Evolving/Changing   Clinical Presentation Rationale clinical judgement   Clinical Decision Making (Complexity) moderate complexity   Therapy Frequency (PT) 5x/week   Predicted Duration of Therapy Intervention (days/wks) 3 weeks   Planned Therapy Interventions (PT) balance training;bed mobility training;gait training;home exercise program;patient/family education;stair training;strengthening;stretching;transfer training;progressive activity/exercise;risk factor education;home program guidelines   Risk & Benefits of therapy have been explained evaluation/treatment results reviewed   PT Discharge Planning    PT Discharge Recommendation (DC Rec) Transitional Care Facility   PT Rationale for DC Rec PT Pt currently needing heavy A of 1-2, unsafe to discharge home   PT Brief overview of current status  Mod A x2 standing, short gait to bed   Total Evaluation Time   Total Evaluation Time (Minutes) 8   Skin WDL   Skin WDL X   Skin Color jean   Skin Temperature warm   Skin Moisture dry,flaky   Skin Elasticity quick return to original state   Skin Integrity/Characteristics bruised

## 2022-02-01 ENCOUNTER — APPOINTMENT (OUTPATIENT)
Dept: PHYSICAL THERAPY | Facility: CLINIC | Age: 62
DRG: 380 | End: 2022-02-01
Attending: INTERNAL MEDICINE
Payer: COMMERCIAL

## 2022-02-01 ENCOUNTER — APPOINTMENT (OUTPATIENT)
Dept: OCCUPATIONAL THERAPY | Facility: CLINIC | Age: 62
DRG: 380 | End: 2022-02-01
Attending: INTERNAL MEDICINE
Payer: COMMERCIAL

## 2022-02-01 LAB
ANION GAP SERPL CALCULATED.3IONS-SCNC: 4 MMOL/L (ref 3–14)
BACTERIA BLD CULT: NO GROWTH
BACTERIA BLD CULT: NO GROWTH
BUN SERPL-MCNC: 15 MG/DL (ref 7–30)
CALCIUM SERPL-MCNC: 8.9 MG/DL (ref 8.5–10.1)
CHLORIDE BLD-SCNC: 111 MMOL/L (ref 94–109)
CO2 SERPL-SCNC: 26 MMOL/L (ref 20–32)
CREAT SERPL-MCNC: 1.02 MG/DL (ref 0.52–1.04)
ERYTHROCYTE [DISTWIDTH] IN BLOOD BY AUTOMATED COUNT: 15 % (ref 10–15)
GFR SERPL CREATININE-BSD FRML MDRD: 62 ML/MIN/1.73M2
GLUCOSE BLD-MCNC: 86 MG/DL (ref 70–99)
GLUCOSE BLDC GLUCOMTR-MCNC: 96 MG/DL (ref 70–99)
HCT VFR BLD AUTO: 29.5 % (ref 35–47)
HGB BLD-MCNC: 9.4 G/DL (ref 11.7–15.7)
MAGNESIUM SERPL-MCNC: 2.1 MG/DL (ref 1.6–2.3)
MCH RBC QN AUTO: 32 PG (ref 26.5–33)
MCHC RBC AUTO-ENTMCNC: 31.9 G/DL (ref 31.5–36.5)
MCV RBC AUTO: 100 FL (ref 78–100)
PHOSPHATE SERPL-MCNC: 2.7 MG/DL (ref 2.5–4.5)
PLATELET # BLD AUTO: 267 10E3/UL (ref 150–450)
POTASSIUM BLD-SCNC: 4.1 MMOL/L (ref 3.4–5.3)
RBC # BLD AUTO: 2.94 10E6/UL (ref 3.8–5.2)
SODIUM SERPL-SCNC: 141 MMOL/L (ref 133–144)
WBC # BLD AUTO: 7 10E3/UL (ref 4–11)

## 2022-02-01 PROCEDURE — 97110 THERAPEUTIC EXERCISES: CPT | Mod: GP | Performed by: REHABILITATION PRACTITIONER

## 2022-02-01 PROCEDURE — 250N000013 HC RX MED GY IP 250 OP 250 PS 637: Performed by: STUDENT IN AN ORGANIZED HEALTH CARE EDUCATION/TRAINING PROGRAM

## 2022-02-01 PROCEDURE — 999N000127 HC STATISTIC PERIPHERAL IV START W US GUIDANCE

## 2022-02-01 PROCEDURE — 84100 ASSAY OF PHOSPHORUS: CPT

## 2022-02-01 PROCEDURE — 82310 ASSAY OF CALCIUM: CPT

## 2022-02-01 PROCEDURE — 250N000013 HC RX MED GY IP 250 OP 250 PS 637: Performed by: INTERNAL MEDICINE

## 2022-02-01 PROCEDURE — 97110 THERAPEUTIC EXERCISES: CPT | Mod: GO

## 2022-02-01 PROCEDURE — 36415 COLL VENOUS BLD VENIPUNCTURE: CPT

## 2022-02-01 PROCEDURE — 85027 COMPLETE CBC AUTOMATED: CPT

## 2022-02-01 PROCEDURE — 250N000013 HC RX MED GY IP 250 OP 250 PS 637: Performed by: NURSE PRACTITIONER

## 2022-02-01 PROCEDURE — 120N000002 HC R&B MED SURG/OB UMMC

## 2022-02-01 PROCEDURE — 99232 SBSQ HOSP IP/OBS MODERATE 35: CPT | Performed by: INTERNAL MEDICINE

## 2022-02-01 PROCEDURE — 97530 THERAPEUTIC ACTIVITIES: CPT | Mod: GO

## 2022-02-01 PROCEDURE — 97530 THERAPEUTIC ACTIVITIES: CPT | Mod: GP | Performed by: REHABILITATION PRACTITIONER

## 2022-02-01 PROCEDURE — 250N000013 HC RX MED GY IP 250 OP 250 PS 637

## 2022-02-01 PROCEDURE — 83735 ASSAY OF MAGNESIUM: CPT | Performed by: HOSPITALIST

## 2022-02-01 RX ORDER — MIDODRINE HYDROCHLORIDE 5 MG/1
5 TABLET ORAL DAILY
Status: DISCONTINUED | OUTPATIENT
Start: 2022-02-02 | End: 2022-02-02 | Stop reason: HOSPADM

## 2022-02-01 RX ORDER — MIDODRINE HYDROCHLORIDE 5 MG/1
5 TABLET ORAL 2 TIMES DAILY
Status: DISCONTINUED | OUTPATIENT
Start: 2022-02-01 | End: 2022-02-01

## 2022-02-01 RX ORDER — MIDODRINE HYDROCHLORIDE 5 MG/1
5 TABLET ORAL DAILY
Status: DISCONTINUED | OUTPATIENT
Start: 2022-02-02 | End: 2022-02-01

## 2022-02-01 RX ADMIN — NALTREXONE HYDROCHLORIDE 50 MG: 50 TABLET, FILM COATED ORAL at 08:19

## 2022-02-01 RX ADMIN — FOLIC ACID 1 MG: 1 TABLET ORAL at 08:19

## 2022-02-01 RX ADMIN — GABAPENTIN 300 MG: 300 CAPSULE ORAL at 05:10

## 2022-02-01 RX ADMIN — SUCRALFATE 1 G: 1 SUSPENSION ORAL at 08:19

## 2022-02-01 RX ADMIN — DICLOFENAC SODIUM 2 G: 10 GEL TOPICAL at 08:25

## 2022-02-01 RX ADMIN — THIAMINE HCL TAB 100 MG 100 MG: 100 TAB at 08:19

## 2022-02-01 RX ADMIN — HYDROXYZINE HYDROCHLORIDE 25 MG: 25 TABLET, FILM COATED ORAL at 11:50

## 2022-02-01 RX ADMIN — POTASSIUM & SODIUM PHOSPHATES POWDER PACK 280-160-250 MG 1 PACKET: 280-160-250 PACK at 20:22

## 2022-02-01 RX ADMIN — BUSPIRONE HYDROCHLORIDE 30 MG: 30 TABLET ORAL at 08:19

## 2022-02-01 RX ADMIN — FAMOTIDINE 20 MG: 10 TABLET, FILM COATED ORAL at 08:19

## 2022-02-01 RX ADMIN — MIDODRINE HYDROCHLORIDE 5 MG: 5 TABLET ORAL at 08:19

## 2022-02-01 RX ADMIN — Medication 5 MG: at 20:28

## 2022-02-01 RX ADMIN — HYDROXYZINE HYDROCHLORIDE 25 MG: 25 TABLET, FILM COATED ORAL at 05:10

## 2022-02-01 RX ADMIN — GABAPENTIN 300 MG: 300 CAPSULE ORAL at 11:50

## 2022-02-01 RX ADMIN — MIDODRINE HYDROCHLORIDE 5 MG: 5 TABLET ORAL at 11:50

## 2022-02-01 RX ADMIN — FERROUS SULFATE TAB 325 MG (65 MG ELEMENTAL FE) 325 MG: 325 (65 FE) TAB at 08:19

## 2022-02-01 RX ADMIN — Medication 1 TABLET: at 08:19

## 2022-02-01 RX ADMIN — POTASSIUM & SODIUM PHOSPHATES POWDER PACK 280-160-250 MG 1 PACKET: 280-160-250 PACK at 11:50

## 2022-02-01 RX ADMIN — MIRTAZAPINE 15 MG: 15 TABLET, ORALLY DISINTEGRATING ORAL at 20:24

## 2022-02-01 RX ADMIN — HYDROXYZINE HYDROCHLORIDE 25 MG: 25 TABLET, FILM COATED ORAL at 20:22

## 2022-02-01 RX ADMIN — PANTOPRAZOLE SODIUM 40 MG: 40 TABLET, DELAYED RELEASE ORAL at 08:19

## 2022-02-01 RX ADMIN — THIAMINE HCL TAB 100 MG 100 MG: 100 TAB at 20:29

## 2022-02-01 RX ADMIN — BUSPIRONE HYDROCHLORIDE 30 MG: 30 TABLET ORAL at 20:22

## 2022-02-01 ASSESSMENT — ACTIVITIES OF DAILY LIVING (ADL)
ADLS_ACUITY_SCORE: 12
ADLS_ACUITY_SCORE: 14
ADLS_ACUITY_SCORE: 12
ADLS_ACUITY_SCORE: 12
ADLS_ACUITY_SCORE: 14
ADLS_ACUITY_SCORE: 12

## 2022-02-01 NOTE — PLAN OF CARE
[id number]: admitted 1/27 with GI bleed, no resolved.  Pt has been alert, disoriented to place, intermittent HTN otherwise vitally stable on RA.  Ambulating to bathroom with A1+W, bed alarm on overnight.  Denies pain.  Slept well.  New PIV placed, IVMF discontinued.  Voiding WNL, no BM.  Mag recheck in AM.  Will continue to monitor     /72 (BP Location: Left arm)   Pulse 87   Temp 99.1  F (37.3  C) (Oral)   Resp 16   Wt 62.6 kg (138 lb)   LMP 09/16/2006 (Exact Date)   SpO2 96%   BMI 20.98 kg/m

## 2022-02-01 NOTE — PROGRESS NOTES
[facility]    Progress Note - Medicine Service, [facility] TEAM 1       Date of Admission:  1/27/2022    Assessment & Plan          61 year old female h/o alcohol use disorder and hepatic steatosis, admitted for hypotension following several days of melena and coffee ground emesis, found to have leukocytosis with neutrophilic predominance, TIFFANY, myoglobinuria, and lactic acidosis that responded to IV fluids. Highest suspicion is for alcohol use limiting other PO intake resulting in gastritis, hypovolemia, and concern for rhabdomyolysis.     Changes Today:  - No changes, patient refusing TCU and opting to go home with 24-hour supervision + home PT/OT     Hypotension  History of HTN  Pt noted to have low BP during admission. No signs of sepsis or cardiogenic etiology. Patients orthostatic testing is positive ( sitting and 81 standing) confirming that she is likely dehydrated.   - continue midodrine 5 mg BID  - continue to hold PTA amlodipine, atenolol, and clonidine  - encourage PO intake  - continue with scheduled mirtazapine at bedtime to stimulate appetite     Acute Toxic & Metabolic Encephalopathy, improving  Alcohol Use Disorder c/b withdrawal  Likely multifactorial including alcohol intoxication, benzodiazepines (7 mg given at other ED prior to arrival), and TIFFANY on CKD, now with possible component of alcohol withdrawal. Patient has a history of thiamine deficiency c/b neuropathy so could be underlying this as well. Head CT negative for any intracranial pathology, but notable for mild generalized cerebral volume loss slightly greater than expected for age, so possible chronic component. Ammonia within normal limits and no acute liver failure or cirrhosis, so unlikely HE.  - MAZIN benzo-sparing protocol for high risk patients  - addiction medicine consulted, appreciate recs  - continue naltrexone 50 mg daily on 1/29  - continue gabapentin at 300 mg TID  - continue  thiamine and folic acid supplementation  - PRN valium and haldol  - discontinued clonidine patch due to hypotensive episodes 1/29     UGIB secondary to severe esophagitis LA grade D  Acute on chronic iron deficiency anemia  C/f Hemolysis  - Patient presented for melena and coffee-ground emesis. Baseline Hgb appears to be 9-10, on presentation at 6.7. Most likely due to the UGIB secondary to EtOH induced esophagitis. Patient has a history of FLORI in the past for which she takes ferrous sulfate at home. Additionally, UA notable for myoglobinuria (moderate blood but <1 RBC), however workup suggests against rhabdomyolysis. This could be a component of hemolytic anemia given normocytic MCV, although unclear given normal indirect bilirubin. Patient on folic acid, so difficult to assess if there was macrocytic overlying microcytic anemia. B12 wnl and patient currently on folic acid supplementation.  - EGD performed at bedside, notable for esophagitis with no active bleed or fresh blood visible   - Continue protonix PO BID  - Continue famotidine every day  - Continue sucrafate BID  - Continue PTA ferrous sulfate 325 mg daily  - Continue folic acid  - Chronic management:  -- Strict avoidance of EtOH, tobacco, NSAIDs   -- Upon discharge, outpatient EGD to follow up healing and assess for underlying Monahan's Esophagus      Acute Kidney Injury on CKD Stage 3, prerenal + heme-pigment induced,resolved  Unclear rate of rise with last Cr >3months ago. Prerenal most likely etiology with hyaline casts, GI losses, and likely poor PO intake. Intrarenal component possible given patient has moderate blood but little to no RBCs in urine, suggestive of possible myoglobinuria. CK wnl and K/Phos both appear to be low, so rhabdomyolysis unlikely. However, there could be a component of hemolysis vs nephrotic syndrome. Patient has been referred to Nephrology in the past for workup of CKD3, however, pt has not seen them yet.  - Creatinine at  baseline (0.9-1.0)  - Daily BMP  - Strict I/O  - Nephrology referral on discharge     Malnutrition  - Nutrition consulted, appreciate recs  - Regular diet  - Continue scheduled mirtazapine at bedtime for appetite stimulation     Hypophosphatemia- resolved  Hypokalemia- resolved  Hypomagnesemia- resolved  - High replacement protocols for K, Mg, and Phos  - Daily BMP, Mg, Phos     Hepatic steatosis 2/2 alcohol use  Increased hepatic echogenecity seen on US in 2015, no abdominal imaging since that time. Hepatitis screened for in the past normal.   - Trend LFTs     GERD  - Hold PTA mylanta, tums, omeprazole while acute and on protonix and famotidine     Generalized Anxiety Disorder  Panic Disorder  - Continue PTA buspirone 30 mg BID  - Continue PTA hydroxyzine 25-50 mg Q8H      Chronic Pain Syndrome  - Continue PTA PRN diclofenac gel  - Hold PTA cyclobenzaprine, not reconciled  - Hold PTA vicodin (avoiding APAP in hepatic steatosis)  - Discontinue oxycodone, starting naltrexone and patient not using         Diet: Advance Diet as Tolerated: Regular Diet Adult    DVT Prophylaxis: Pneumatic Compression Devices  Soto Catheter: Not present  Fluids: None  Central Lines: None  Cardiac Monitoring: None  Code Status: Full Code      Disposition Plan   Expected Discharge: 02/01/2022     Anticipated discharge location: home with help/services         The patient's care was discussed with the Attending Physician, Dr. Moreno.    Valery Steel MD  Medicine Service, [facility] TEAM [address] [place] [address]  Securely message with the Vocera Web Console (learn more here)  Text page via [facility] Paging/Directory   Please see signed in provider for up to date coverage information      ______________________________________________________________________    Interval History   NAEO. Patient feels well, endorses more of an appetite today. She expresses that she wants to go home, and refuses to go to a  TCU.    Data reviewed today: I reviewed all medications, new labs and imaging results over the last 24 hours. I personally reviewed no images or EKG's today.    Physical Exam   Vital Signs: Temp: 99.1  F (37.3  C) Temp src: Oral BP: (!) 166/103 Pulse: 104   Resp: 16 SpO2: 98 % O2 Device: None (Room air)    Weight: 135 lbs 11.2 oz  Constitutional: awake, alert, cooperative, no apparent distress, and appears stated age. Working with PT in room.  Eyes: EOMI, sclera anicteric  ENT: poor dentition, mucus membranes moist  Respiratory: No increased work of breathing, good air exchange, clear to auscultation bilaterally, no crackles or wheezing  Cardiovascular: regular rate and rhythm, normal S1 and S2, no murmur noted and no edema  GI: normal bowel sounds, soft, distended and non-tender  Skin: no bruising or bleeding  Musculoskeletal: no lower extremity pitting edema present  Neurologic: Mental Status Exam:  Level of Alertness:   awake  Orientation:   person, place, time  Neuropsychiatric: General: normal, calm and normal eye contact    Data   Recent Labs   Lab 02/01/22  0721 01/31/22  0657 01/30/22  0459 01/29/22  0442 01/28/22  0506 01/26/22  2351 01/26/22  1852   WBC 7.0 6.5 5.1   < > 9.3   < > 16.5*   HGB 9.4* 10.2* 9.5*   < > 11.1*   < > 9.2*    100 100   < > 92   < > 99    223 144*   < > 149*   < > 303   INR  --   --   --   --  1.14  --  1.28*    135 135   < > 134  134   < > 128*   POTASSIUM 4.1 4.3 4.1   < > 3.4  3.4   < > 3.5   CHLORIDE 111* 105 104   < > 99  100   < > 80*   CO2 26 24 26   < > 28  29   < > 27   BUN 15 18 24   < > 42*  42*   < > 109*   CR 1.02 0.95 0.94   < > 1.16*  1.13*   < > 2.01*   ANIONGAP 4 6 5   < > 7  5   < > 21*   SUE 8.9 8.6 8.1*   < > 8.3*  8.6   < > 11.6*   GLC 86 99 138*   < > 119*  117*   < > 145*   ALBUMIN  --  2.1* 2.0*   < > 2.4*   < > 3.3*   PROTTOTAL  --  5.6* 5.1*   < > 5.7*   < > 7.3   BILITOTAL  --  0.4 0.5   < > 1.0   < > 0.9   ALKPHOS  --  97 100    < > 70   < > 97   ALT  --  37 42   < > 52*   < > 24   AST  --  29 41   < > 94*   < > 31    < > = values in this interval not displayed.

## 2022-02-01 NOTE — PROGRESS NOTES
"[id number]: Afebrile. O2 stable on RA. Hypertensive 150s/90s. Disoriented to time. Forgetful. Bed alarm on for safety. Up with SBA, pt is steady on feet. Denies pain or nausea. Regular diet with good appetite. Voiding adequately. LBM 1/31. Phosphorus replaced per protocol and re-check ordered for tomorrow morning. Uses call light appropriately. Plan to discharge home tomorrow with .    Shift highlights: pt hypertensive throughout the day 150s-160s systolically. Provider notified. Midodrine decreased to once daily. Plan to re-start PTA antihypertensives upon discharge tomorrow.     1745: Pt somnolent. Arousing briefly to sternal rub but unable to keep eyes open for more than a few seconds. VS unchanged, still maintaining RA. Glucose 96. Rapid response called. After repeated stimulation by team pt was able to wake up. Pt groggy but able to follow commands. Neuros intact. Pt states \"I was just sleeping.\" Will continue monitoring frequently.    Claudine Akhtar RN    "

## 2022-02-01 NOTE — PROGRESS NOTES
Rapid Response Team Note    Assessment   In assessment a rapid response was called on Mihaela Vance due to AMS. Patient was noted to be nearly unresponsive by bedside RN. Upon bedside evaluation, patient somnolent and does not initially respond to voice. BG checked at 96. VSS. Patient received gabapentin this afternoon. Upon sternal rub, patient moves bilateral UE and opens eyes. She initially states that she is dizzy and w/ HA, though quickly wakes up and states that she is just really sleepy and is wondering why there is so much commotion in the room. RRT deescalated.      Plan   -  Continue to closely monitor patients mental status  -  The Internal Medicine primary team was at the bedside.  -  Disposition: The patient will remain on the current unit. We will continue to monitor this patient closely.  -  Reassessment and plan follow-up will be performed by the primary team      Steffi Yi PA-C  [facility] RRT AMCOM Job Code Contact #[phone]    Hospital Course   Brief Summary of events leading to rapid response:   61 year old female h/o alcohol use disorder and hepatic steatosis, admitted for hypotension following several days of melena and coffee ground emesis, found to have leukocytosis with neutrophilic predominance, TIFFANY, myoglobinuria, and lactic acidosis that responded to IV fluids. Highest suspicion is for alcohol use limiting other PO intake resulting in gastritis, hypovolemia, and concern for rhabdomyolysis.  RRT called for acute change in mental status.       Admission Diagnosis:   UGIB (upper gastrointestinal bleed) [K92.2]     Physical Exam   Temp: (!) 96.4  F (35.8  C) Temp  Min: 96.4  F (35.8  C)  Max: 99.1  F (37.3  C)  Resp: 18 Resp  Min: 16  Max: 18  SpO2: 97 % SpO2  Min: 94 %  Max: 99 %  Pulse: 78 Pulse  Min: 78  Max: 111    No data recorded  BP: (!) 153/83 Systolic (24hrs), Av , Min:115 , Max:166   Diastolic (24hrs), Av, Min:72, Max:119     I/Os: I/O last 3 completed  shifts:  In: 600 [P.O.:600]  Out: -      Exam:   General: Somnolent, though does respond to noxious stimuli  Mental Status: altered level of consciousness based on Somnolence.  CV: RRR, No m/r/g  Resp: Breathing non-labored on RA  Neuro: Moves all extremities, initially somnolent, though mentation does improve with noxious stimuli.   Skin: No rash to exposed skin areas      Significant Results and Procedures   Lactic Acid:   Recent Labs   Lab Test 01/29/22  0442 01/28/22  0506 01/27/22  0343   LACT 0.8 0.7 2.2*     CBC:   Recent Labs   Lab Test 02/01/22  0721 01/31/22  0657 01/30/22  0459   WBC 7.0 6.5 5.1   HGB 9.4* 10.2* 9.5*   HCT 29.5* 32.0* 29.6*    223 144*

## 2022-02-01 NOTE — PLAN OF CARE
Pt is alert and oriented except off on month. C/o mild pain from BP cuff at L bicep. Up with assist of 1 and walker. Ate well. Voids in BR. Maintains oxygen sats on room air. Bed alarm is on.

## 2022-02-01 NOTE — PROGRESS NOTES
"Addiction Team Social Work Note    Date of  Intervention: 02/01/22  Collaborated with:  Dr. De Anda; patient    Patient information: Addiction Medicine SW suggested to see pt for out-patient JASWINDER resources.      Intervention:  Chart reviewed.  Writer met with pt, introduced self, role and reason for the visit.  Pt states her main goal at this time is to discharge; she would like to discharge today.  No current goals regarding her JASWINDER.  She states the plan is to have home health care come into her home to \"make sure I can be safe alone\".  She perceives this service is being 'forced' onto her but she voices agreement to this type of care.    We discussed specific outpatient [facility]AS[facility] resources.  Pt states she has \"tried all that and I'm just done with it\".  She reports that she has seen a psychiatrist and was put on a \"heavy duty antidepressant\" but stopped taking the medication due to side effects; she states she did not feel herself on this medication.  She also reports a strange encounter with the psychiatrist where the psychiatrist was telling her that 'people' were listening to their conversation via a radio and pt reports being very put off by their conversation so does not want to see a psychiatrist ever again.  Pt also states she has talked with multiple therapists, went to AA meetings, and went to CD treatment.  At this time, she reports not wanting 'to do any of it.  I'm done with all that.\"    Writer offered to put the [facility] number on pt's discharge instructions and she is agreeable to this, in case she changes her mind about accessing services.    Assessment:  Discussion of JASWINDER resources.  Pt is pre-contemplative.    Plan:    Anticipated Disposition:  Home.    Follow Up:  Writer available as needed.    Due to regulation of Title 42 of the Code of Federal Regulations (CFR) Part 2: Confidentiality laws apply to this note and the information wherein.  Thus, " this note cannot be copy and pasted into any other health care staff's note nor can it be included in general medical records sent to ANY outside agency without the patient's written consent.    AJAY Gonzáles  She/her/hers  Social Work Services  Addiction Team  [phone] work cell phone  [phone] pager  8:00am-4:30pm M/T/Th/F; Off Wednesday's

## 2022-02-01 NOTE — PROGRESS NOTES
Care Management Discharge Note      Discharge Date: 02/01/2022     Discharge Disposition: Home w/24hr support and supervision from significant other.    Discharge Services: okay per Dr Steel to order outpatient PT and OT referrals in-lieu of home care services    Discharge DME: per therapy rec's a walker will be issued from the hospital prior to discharge    Discharge Transportation: S.O. will provide    Private pay costs discussed: Not applicable  PAS Confirmation Code:  NA  Patient/family educated on Medicare website which has current facility and service quality ratings: yes, pt prefers [facility] as her PCP is with  also. Referral sent to [facility], reply below.    Delmy   I checked with our [facility] branch and unfortunately, they do not have a behavioral health RN on staff at this time to case manage this patient. [facility] will have to decline this referral at this time.   Thank you    Education Provided on the Discharge Plan:  yes  Persons Notified of Discharge Plans: patient and sig other  Patient/Family in Agreement with the Plan: yes    Internal Handoff Referral Completed: Yes  PCP:   Kylah Sánchez APRN CNP       Primary Location:   [facility]       Additional Information:   Pt declining TCU at this time. Per Dr Moreno patient will have 24hr support and supervision from spouse/s.o.. Spoke with patient by hospital phone she is agreeable to home care services, PT.     Patient is anxious to discharge today, really does not wish to wait for RNCC to search for HHPT. Additionally patient is not wanting home skilled nursing or behavior health nursing. Writer explained to [facility] Dr Steel due to pt's ETOH history most home care agencies will require a skilled nurse or a behavior health nurse which due to covid strain on community resources are very difficult to secure. For these reasons Dr Steel auth'd writer ordering outpatient PT referral in-lieu of home care  services      Delmy Marc RN, BSN, PHN  Care Coordinator  [facility]  [place]  [address]  Desk phone & confidential voicemail: [phone]  Pager: [phone]

## 2022-02-02 ENCOUNTER — APPOINTMENT (OUTPATIENT)
Dept: OCCUPATIONAL THERAPY | Facility: CLINIC | Age: 62
DRG: 380 | End: 2022-02-02
Attending: INTERNAL MEDICINE
Payer: COMMERCIAL

## 2022-02-02 ENCOUNTER — APPOINTMENT (OUTPATIENT)
Dept: PHYSICAL THERAPY | Facility: CLINIC | Age: 62
DRG: 380 | End: 2022-02-02
Attending: INTERNAL MEDICINE
Payer: COMMERCIAL

## 2022-02-02 VITALS
BODY MASS INDEX: 20.66 KG/M2 | RESPIRATION RATE: 18 BRPM | HEART RATE: 77 BPM | WEIGHT: 135.9 LBS | TEMPERATURE: 99.1 F | SYSTOLIC BLOOD PRESSURE: 142 MMHG | DIASTOLIC BLOOD PRESSURE: 97 MMHG | OXYGEN SATURATION: 100 %

## 2022-02-02 LAB
ANION GAP SERPL CALCULATED.3IONS-SCNC: 4 MMOL/L (ref 3–14)
BUN SERPL-MCNC: 13 MG/DL (ref 7–30)
CALCIUM SERPL-MCNC: 9.1 MG/DL (ref 8.5–10.1)
CHLORIDE BLD-SCNC: 107 MMOL/L (ref 94–109)
CO2 SERPL-SCNC: 27 MMOL/L (ref 20–32)
CREAT SERPL-MCNC: 0.93 MG/DL (ref 0.52–1.04)
ERYTHROCYTE [DISTWIDTH] IN BLOOD BY AUTOMATED COUNT: 15 % (ref 10–15)
GFR SERPL CREATININE-BSD FRML MDRD: 69 ML/MIN/1.73M2
GLUCOSE BLD-MCNC: 95 MG/DL (ref 70–99)
HCT VFR BLD AUTO: 34.1 % (ref 35–47)
HGB BLD-MCNC: 10.8 G/DL (ref 11.7–15.7)
MAGNESIUM SERPL-MCNC: 1.9 MG/DL (ref 1.6–2.3)
MCH RBC QN AUTO: 31.4 PG (ref 26.5–33)
MCHC RBC AUTO-ENTMCNC: 31.7 G/DL (ref 31.5–36.5)
MCV RBC AUTO: 99 FL (ref 78–100)
PHOSPHATE SERPL-MCNC: 2.9 MG/DL (ref 2.5–4.5)
PLATELET # BLD AUTO: 358 10E3/UL (ref 150–450)
POTASSIUM BLD-SCNC: 4.4 MMOL/L (ref 3.4–5.3)
RBC # BLD AUTO: 3.44 10E6/UL (ref 3.8–5.2)
SODIUM SERPL-SCNC: 138 MMOL/L (ref 133–144)
WBC # BLD AUTO: 8.3 10E3/UL (ref 4–11)

## 2022-02-02 PROCEDURE — 250N000009 HC RX 250: Performed by: NURSE PRACTITIONER

## 2022-02-02 PROCEDURE — 97530 THERAPEUTIC ACTIVITIES: CPT | Mod: GP | Performed by: REHABILITATION PRACTITIONER

## 2022-02-02 PROCEDURE — 83735 ASSAY OF MAGNESIUM: CPT | Performed by: INTERNAL MEDICINE

## 2022-02-02 PROCEDURE — 250N000013 HC RX MED GY IP 250 OP 250 PS 637

## 2022-02-02 PROCEDURE — 250N000013 HC RX MED GY IP 250 OP 250 PS 637: Performed by: NURSE PRACTITIONER

## 2022-02-02 PROCEDURE — 82310 ASSAY OF CALCIUM: CPT

## 2022-02-02 PROCEDURE — 84100 ASSAY OF PHOSPHORUS: CPT

## 2022-02-02 PROCEDURE — 97530 THERAPEUTIC ACTIVITIES: CPT | Mod: GO

## 2022-02-02 PROCEDURE — 250N000013 HC RX MED GY IP 250 OP 250 PS 637: Performed by: STUDENT IN AN ORGANIZED HEALTH CARE EDUCATION/TRAINING PROGRAM

## 2022-02-02 PROCEDURE — 250N000013 HC RX MED GY IP 250 OP 250 PS 637: Performed by: INTERNAL MEDICINE

## 2022-02-02 PROCEDURE — 99239 HOSP IP/OBS DSCHRG MGMT >30: CPT | Mod: GC | Performed by: INTERNAL MEDICINE

## 2022-02-02 PROCEDURE — 97116 GAIT TRAINING THERAPY: CPT | Mod: GP | Performed by: REHABILITATION PRACTITIONER

## 2022-02-02 PROCEDURE — 85027 COMPLETE CBC AUTOMATED: CPT

## 2022-02-02 PROCEDURE — 36415 COLL VENOUS BLD VENIPUNCTURE: CPT

## 2022-02-02 RX ORDER — GABAPENTIN 100 MG/1
100 CAPSULE ORAL EVERY 8 HOURS
Qty: 30 CAPSULE | Refills: 0 | Status: SHIPPED | OUTPATIENT
Start: 2022-02-02 | End: 2023-03-23

## 2022-02-02 RX ORDER — NALTREXONE HYDROCHLORIDE 50 MG/1
50 TABLET, FILM COATED ORAL DAILY
Qty: 30 TABLET | Refills: 0 | Status: SHIPPED | OUTPATIENT
Start: 2022-02-02 | End: 2023-03-23

## 2022-02-02 RX ORDER — GABAPENTIN 300 MG/1
300 CAPSULE ORAL EVERY 8 HOURS
Qty: 90 CAPSULE | Refills: 0 | Status: SHIPPED | OUTPATIENT
Start: 2022-02-02 | End: 2022-02-02

## 2022-02-02 RX ORDER — FAMOTIDINE 20 MG/1
20 TABLET, FILM COATED ORAL DAILY
Qty: 30 TABLET | Refills: 0 | Status: SHIPPED | OUTPATIENT
Start: 2022-02-03 | End: 2023-01-01

## 2022-02-02 RX ORDER — CALCIUM CARBONATE 500 MG/1
500 TABLET, CHEWABLE ORAL DAILY PRN
Status: DISCONTINUED | OUTPATIENT
Start: 2022-02-02 | End: 2022-02-02 | Stop reason: HOSPADM

## 2022-02-02 RX ORDER — SUCRALFATE ORAL 1 G/10ML
1 SUSPENSION ORAL
Qty: 420 ML | Refills: 0 | Status: SHIPPED | OUTPATIENT
Start: 2022-02-02 | End: 2023-03-23

## 2022-02-02 RX ORDER — HYDROXYZINE HYDROCHLORIDE 25 MG/1
25 TABLET, FILM COATED ORAL EVERY 8 HOURS PRN
Status: DISCONTINUED | OUTPATIENT
Start: 2022-02-02 | End: 2022-02-02 | Stop reason: HOSPADM

## 2022-02-02 RX ORDER — HYDROXYZINE HYDROCHLORIDE 25 MG/1
25 TABLET, FILM COATED ORAL
Qty: 120 TABLET | Refills: 4 | Status: SHIPPED | OUTPATIENT
Start: 2022-02-02 | End: 2023-03-02

## 2022-02-02 RX ORDER — MAGNESIUM OXIDE 400 MG/1
400 TABLET ORAL 2 TIMES DAILY
Status: DISCONTINUED | OUTPATIENT
Start: 2022-02-02 | End: 2022-02-02 | Stop reason: HOSPADM

## 2022-02-02 RX ADMIN — BUSPIRONE HYDROCHLORIDE 30 MG: 30 TABLET ORAL at 07:46

## 2022-02-02 RX ADMIN — METOPROLOL TARTRATE 5 MG: 1 INJECTION, SOLUTION INTRAVENOUS at 07:04

## 2022-02-02 RX ADMIN — HYDROXYZINE HYDROCHLORIDE 25 MG: 25 TABLET, FILM COATED ORAL at 03:35

## 2022-02-02 RX ADMIN — THIAMINE HCL TAB 100 MG 100 MG: 100 TAB at 07:46

## 2022-02-02 RX ADMIN — SUCRALFATE 1 G: 1 SUSPENSION ORAL at 07:46

## 2022-02-02 RX ADMIN — GABAPENTIN 300 MG: 300 CAPSULE ORAL at 11:28

## 2022-02-02 RX ADMIN — PANTOPRAZOLE SODIUM 40 MG: 40 TABLET, DELAYED RELEASE ORAL at 07:46

## 2022-02-02 RX ADMIN — AMLODIPINE BESYLATE 2.5 MG: 2.5 TABLET ORAL at 07:46

## 2022-02-02 RX ADMIN — NALTREXONE HYDROCHLORIDE 50 MG: 50 TABLET, FILM COATED ORAL at 07:46

## 2022-02-02 RX ADMIN — ACETAMINOPHEN 650 MG: 325 TABLET, FILM COATED ORAL at 00:13

## 2022-02-02 RX ADMIN — Medication 400 MG: at 11:28

## 2022-02-02 RX ADMIN — CALCIUM CARBONATE (ANTACID) CHEW TAB 500 MG 500 MG: 500 CHEW TAB at 03:00

## 2022-02-02 RX ADMIN — FOLIC ACID 1 MG: 1 TABLET ORAL at 07:46

## 2022-02-02 RX ADMIN — FAMOTIDINE 20 MG: 10 TABLET, FILM COATED ORAL at 07:46

## 2022-02-02 RX ADMIN — FERROUS SULFATE TAB 325 MG (65 MG ELEMENTAL FE) 325 MG: 325 (65 FE) TAB at 07:46

## 2022-02-02 RX ADMIN — ATENOLOL 50 MG: 50 TABLET ORAL at 07:46

## 2022-02-02 RX ADMIN — Medication 1 TABLET: at 07:46

## 2022-02-02 ASSESSMENT — ACTIVITIES OF DAILY LIVING (ADL)
ADLS_ACUITY_SCORE: 12

## 2022-02-02 NOTE — PROGRESS NOTES
Occupational Therapy Cognitive Assessment     Pt scored 25/30 (26+normative range) on MoCA, which indicates mild cognitive deficits. Pt demo'd mild difficulty with attention, abstraction, and orientation. At this time, recommending supervision from SO for higher IADLs (med mgmt, kitchen/home safety) to ensure safety and compliance. Pt reported SO may not be able to assist w/cognitive tasks as pt reported cog decline in SO as well. Will continue to assess update discharge recs as appropriate for level of A.     See note for further details.        02/02/22 1000   Cognitive Assessments   Cognitive Assessments Completed MoCA  (version 8.1)   [place] Cognitive Assessment (MoCA) Total Score out of 30 25/30  (MIS 15/15)   Norms Score of 26 or above considered normal   Domains assessed: attention, executive functions, memory, language, visuoconstructional skills, conceptual thinking, calculations, orientation

## 2022-02-02 NOTE — PROGRESS NOTES
Pt is discharging home with . Went over discharge instructions and follow-ups together with patient and her  at bedside. PIV removed. Pt taken downstairs in wheel chair. Her  will give her a ride home and will  discharge medications at local pharmacy.    Claudine Akhtar RN

## 2022-02-02 NOTE — PLAN OF CARE
Physical Therapy Discharge Summary    Reason for therapy discharge:    Discharged to home with home therapy.    Progress towards therapy goal(s). See goals on Care Plan in [facility] electronic health record for goal details.  Goals partially met.  Barriers to achieving goals:   discharge from facility.    Therapy recommendation(s):    Continued therapy is recommended.  Rationale/Recommendations:  TCU recommended at discharge, however, pt elected to discharge to home, would need 24/7 assist and home care services at discharge.

## 2022-02-02 NOTE — DISCHARGE SUMMARY
[facility]  Discharge Summary - Medicine & Pediatrics       Date of Admission:  1/27/2022  Date of Discharge:  2/2/2022  2:04 PM  Discharging Provider: Noel Aguirre MD  Discharge Service: Medicine Service, TAMIKO TEAM 1    Discharge Diagnoses   Acute toxic and metabolic encephalopathy  Alcohol use disorder  History of Alcohol withdrawal  Upper GI bleed secondary to severe grade D esophagitis  Acute on chronic iron deficiency anemia  TIFFANY on CKD3  Malnutrition  Hypophosphatemia  Hypokalemia  Hypomagnesemia  Hepatic steatosis  GERD  PERCY  Panic Disorder  Chronic Pain Syndrome    Follow-ups Needed After Discharge   Follow-up Appointments     Adult [facility] Follow-up and recommended labs and tests      Follow up with primary care provider, Kylah Sánchez, within 7 days   to evaluate medication change and for hospital follow- up.  The following   labs/tests are recommended: comprehensive metabolic panel and complete   blood count.      Appointments on [place] and/or [facility] (with [facility] or [facility]   provider or service). Call [phone] if you haven't heard regarding   these appointments within 7 days of discharge.             Discharge Disposition   Discharged to home  Condition at discharge: Stable    Hospital Course   61 year old female h/o alcohol use disorder and hepatic steatosis, admitted for hypotension following several days of melena and coffee ground emesis found to have an UGIB secondary to severe grade D esophagitis, TIFFANY on CKD3, and toxic metabolic encephalopathy.    Acute Toxic & Metabolic Encephalopathy, improving  Alcohol Use Disorder c/b withdrawal  Likely multifactorial including alcohol intoxication, benzodiazepines (7 mg given at OSH prior to arrival), and TIFFANY on CKD, with possible component of alcohol withdrawal. Patient has a history of thiamine deficiency c/b neuropathy so thought to possibly be underlying this as well. Head CT negative  for any intracranial pathology, but notable for mild generalized cerebral volume loss slightly greater than expected for age, so possible chronic component. Ammonia within normal limits and no acute liver failure or cirrhosis, so unlikely HE. Patient was on the [facility] benzo-sparing protocol that included a gabapentin taper. She received thiamine and folate supplementation. Given hypotension, her clonidine patch was discontinued. The patient's encephalopathy resolved after electrolyte repletion, IVFs, and gabapentin.     UGIB secondary to severe esophagitis LA grade D  Acute on chronic iron deficiency anemia  C/f Hemolysis  - Patient presented for melena and coffee-ground emesis. Baseline Hgb appears to be 9-10, on presentation at 6.7. Patient underwent EGD on 1/27/22 and was found to have severe grade D  Esophagitis without any  Visible active bleed. Patient has a history of FLORI in the past for which she takes ferrous sulfate at home so this was likely worsening her acute anemia. Additionally, UA notable for myoglobinuria (moderate blood but <1 RBC), and workup suggests against rhabdomyolysis. This could be a component of hemolytic anemia given normocytic MCV, although unclear given normal indirect bilirubin. Patient on folic acid, so difficult to assess if there was macrocytic overlying microcytic anemia. B12 wnl and patient currently on folic acid supplementation. Patient was treated for esophagitis with BID protonix, famotidine, sucralfate, and ferrous sulfate. She was instructed to strictly avoid alcohol, tobacco, and NSAIDs. She will need to undergo outpatient EGD to assess healing of esophagitis and assess for underlying Monahan's esophagus.    Hypotension  History of HTN  Pt noted to have low BP during admission. No signs of sepsis or cardiogenic etiology. Patients orthostatic testing is positive ( sitting and 81 standing) confirming that she is likely dehydrated. She was treated with midodrine 5 mg BID  and her home meds were held while hypotensive. PO intake was encouraged as well and she was given scheduled mirtazapine at bedtime for appetite stimulation. Patient's blood pressure normalized and she was restarted on her home medications (amlodipine, atenolol)     Acute Kidney Injury on CKD Stage 3, prerenal + heme-pigment induced,resolved  Unclear rate of rise with last Cr >3months ago. Prerenal most likely etiology with hyaline casts, GI losses, and likely poor PO intake. Intrarenal component possible given patient has moderate blood but little to no RBCs in urine, suggestive of possible myoglobinuria. CK wnl and K/Phos both appear to be low, so rhabdomyolysis unlikely. However, there could be a component of hemolysis vs nephrotic syndrome. Patient has been referred to Nephrology in the past for workup of CKD3, however, pt has not seen them yet. Her creatinine improved and returned to baseline after IVF supplementation and encouraging PO intake.     Malnutrition  Hypophosphatemia  Hypokalemia  Hypomagnesemia  - Nutrition consulted to guide management of patient. She was started on a regular diet with high replacement protocols for potassium, magnesium, and phosphorus. Patient's labs and creatinine improved to baseline with improved PO intake.      Hepatic steatosis 2/2 alcohol use  Increased hepatic echogenecity seen on US in 2015, no abdominal imaging since that time. Hepatitis screening in the past normal. Monitored.      GERD  - Patient started on protonix, famotidine, and sucralfate inpatient.    Generalized Anxiety Disorder  Panic Disorder  - Continued PTA buspirone 30 mg BID  - Continued PTA hydroxyzine 25-50 mg Q8H      Chronic Pain Syndrome  - Continued PTA PRN diclofenac gel  - Hold PTA cyclobenzaprine, not reconciled  - Hold PTA vicodin (avoiding APAP in hepatic steatosis)  - Discontinued oxycodone, and started naltrexone. Patient motivated to continue naltrexone outpatient. Naltrexone prescribed  outpatient.     Consultations This Hospital Stay   GI LUMINAL ADULT IP CONSULT  VASCULAR ACCESS CARE ADULT IP CONSULT  ADDICTION SERVICE ADULT IP CONSULT FOR [place]  VASCULAR ACCESS CARE ADULT IP CONSULT  PHYSICAL THERAPY ADULT IP CONSULT  OCCUPATIONAL THERAPY ADULT IP CONSULT  CARE MANAGEMENT / SOCIAL WORK IP CONSULT  VASCULAR ACCESS CARE ADULT IP CONSULT  DENTAL HYGIENE IP ADULT CONSULT - UU    Code Status   Prior       The patient was discussed with Dr. Aguirre.    MD Michael Boswell 1 Service  [facility] [address] [place]  [address] [place] [address]  Phone: [phone]  ______________________________________________________________________    Physical Exam   Vital Signs: Temp: 99.1  F (37.3  C) Temp src: Oral BP: (!) 142/97 Pulse: 77   Resp: 18 SpO2: 100 % O2 Device: None (Room air)    Weight: 135 lbs 14.4 oz  Constitutional: awake, alert, cooperative, no apparent distress, and appears stated age. Eyes: EOMI, sclera anicteric  ENT: poor dentition, mucus membranes moist  Respiratory: No increased work of breathing, good air exchange, clear to auscultation bilaterally, no crackles or wheezing  Cardiovascular: regular rate and rhythm, normal S1 and S2, no murmur noted and no edema  GI: normal bowel sounds, soft, distended and non-tender  Skin: no bruising or bleeding  Musculoskeletal: no lower extremity pitting edema present  Neurologic: Mental Status Exam:  Level of Alertness:   awake  Orientation:   person, place, time  Neuropsychiatric: General: normal, calm and normal eye contact      Primary Care Physician   Kylah Sánchez    Discharge Orders      Care Coordination Referral      Physical Therapy Referral      Occupational Therapy Referral      Adult Gastro Ref - Procedure Only      Reason for your hospital stay    You were hospitalized for acute encephalopathy and an upper gastrointestinal bleed secondary to severe esophagitis.     Activity    Your activity upon discharge:  activity as tolerated     Adult [facility] Follow-up and recommended labs and tests    Follow up with primary care provider, Kylah Sánchez, within 7 days to evaluate medication change and for hospital follow- up.  The following labs/tests are recommended: comprehensive metabolic panel and complete blood count.      Appointments on [place] and/or [facility] (with [facility] or [facility] provider or service). Call [phone] if you haven't heard regarding these appointments within 7 days of discharge.     Walker Order    DME Documentation:   Describe the reason for need to support medical necessity: gait instability.     I, the undersigned, certify that the above prescribed supplies are medically necessary for this patient and is both reasonable and necessary in reference to accepted standards of medical and necessary in reference to accepted standards of medical practice in the treatment of this patient's condition and is not prescribed as a convenience.     Diet    Follow this diet upon discharge: Orders Placed This Encounter      Advance Diet as Tolerated: Regular Diet Adult       Significant Results and Procedures   Most Recent 3 BMP's:Recent Labs   Lab Test 02/02/22  0658 02/01/22  1748 02/01/22  0721 01/31/22  0657     --  141 135   POTASSIUM 4.4  --  4.1 4.3   CHLORIDE 107  --  111* 105   CO2 27  --  26 24   BUN 13  --  15 18   CR 0.93  --  1.02 0.95   ANIONGAP 4  --  4 6   SUE 9.1  --  8.9 8.6   GLC 95 96 86 99       Discharge Medications   Discharge Medication List as of 2/2/2022  1:33 PM        START taking these medications    Details   famotidine (PEPCID) 20 MG tablet Take 1 tablet (20 mg) by mouth daily, Disp-30 tablet, R-0, E-Prescribe      sucralfate (CARAFATE) 1 GM/10ML suspension Take 10 mLs (1 g) by mouth 2 times daily (before meals), Disp-420 mL, R-0, E-Prescribe           CONTINUE these medications which have CHANGED    Details   gabapentin (NEURONTIN) 100 MG capsule Take 1 capsule (100  mg) by mouth every 8 hours, Disp-30 capsule, R-0, E-Prescribe      hydrOXYzine (ATARAX) 25 MG tablet Take 1 tablet (25 mg) by mouth nightly as needed for itching, Disp-120 tablet, R-4, E-Prescribe           CONTINUE these medications which have NOT CHANGED    Details   alum & mag hydroxide-simethicone (MYLANTA/MAALOX) 200-200-20 MG/5ML SUSP suspension Take 30 mLs by mouth 2 times daily, Historical      amLODIPine (NORVASC) 2.5 MG tablet Take 1 tablet (2.5 mg) by mouth daily, Disp-90 tablet, R-3, E-Prescribe      atenolol (TENORMIN) 50 MG tablet Take 1 tablet (50 mg) by mouth daily, Disp-90 tablet, R-3, E-Prescribe      B Complex Vitamins (B COMPLEX PO) Take 1 tablet by mouth daily., Historical      busPIRone HCl (BUSPAR) 30 MG tablet Take 1 tablet (30 mg) by mouth 2 times daily, Disp-180 tablet, R-3, E-Prescribe      calcium carbonate (TUMS) 500 MG chewable tablet Take 2 chew tab by mouth 3 times daily, Historical      Calcium Carbonate-Vitamin D (CALCIUM + D PO) Take 2 tablets by mouth daily., Historical      diclofenac (VOLTAREN) 1 % topical gel Place 2 g onto the skin 4 times daily as needed for moderate pain, Disp-60 g, R-3, E-Prescribe      diphenhydrAMINE (BENADRYL ALLERGY) 25 MG tablet Take 25 mg by mouth every 6 hours as needed for itching or allergies, Historical      ferrous sulfate (FEROSUL) 325 (65 Fe) MG tablet Take 1 tablet (325 mg) by mouth daily (with breakfast) Take with orange juice to enhance absorption., Disp-100 tablet, R-3, E-Prescribe      FOLIC ACID PO Take 400 mcg by mouth daily, Historical      magnesium oxide (MAG-OX) 400 MG tablet Take 1 tablet (400 mg) by mouth daily, Disp-60 tablet, R-3, Fax      Melatonin 10 MG TABS tablet Take 10 mg by mouth nightly as needed for sleep, Historical      Omega-3 Fatty Acids (FISH OIL PO) Take 1 capsule by mouth daily , Historical      omeprazole (PRILOSEC) 40 MG DR capsule Take 1 capsule (40 mg) by mouth daily, Disp-90 capsule, R-3, E-Prescribe       vitamin B1 (THIAMINE) 100 MG tablet Take 1 tab 3 times daily by mouth for 1 week and then decrease to 1 tab daily., Disp-90 tablet, R-3, E-Prescribe      VITAMIN D, CHOLECALCIFEROL, PO Take 400 Units by mouth daily , Historical           Allergies   Allergies   Allergen Reactions    Lisinopril Other (See Comments)     Terrible taste to food    Ativan Nausea and Vomiting    Lorazepam Nausea and Vomiting    Sertraline Other (See Comments)     Irritation and tightness in throat    Tizanidine Other (See Comments)     Fainting     Quetiapine Nausea     Other reaction(s): Other (See Comments)  Very tired, couldn't do anything    Seroquel [Quetiapine Fumarate] Other (See Comments) and Nausea     Very tired, couldn't do anything    Hydrochlorothiazide Hives and Rash       Staff Addendum to Discharge Summary  Date of Service: 2/2/2022  I have seen and examined the patient, reviewed the data and discussed the plan of care with the service team.  I agree with the above documentation.    >30 minutes spent in discharge, including >50% in counseling and coordination of care, medication review and plan of care recommended on follow up. Questions were answered at length with patient including medication counseling and follow-up care.     Kylah Greco  (PCP) was contacted electronically at the time of discharge, so as to bridge from hospital to outpatient care.   It was our pleasure to care for the patient during this hospitalization. Please do not hesitate to contact me should there be questions regarding the hospital course or discharge plan.      Noel Aguirre MD   of Medicine  Internal Medicine [facility]

## 2022-02-02 NOTE — PLAN OF CARE
Time 7264-7852     Reason for admission:   UGIB (upper gastrointestinal bleed) [K92.2]      Vitals: BP (!) 164/103 (BP Location: Left arm)   Pulse 100   Temp 98.5  F (36.9  C) (Oral)   Resp 18   Wt 61.6 kg (135 lb 11.2 oz)   LMP 09/16/2006 (Exact Date)   SpO2 97%   BMI 20.63 kg/m      Activity: Standby assist  Pain: Denies  Neuro: Disoriented to time was able to be reoriented by showing her multiple clocks with the time. Anxious about discharging.   Cardiac: /109 at 0550  Respiratory: WNL  GI/: WNL  Diet: Reg  Lines: R PIV SL  Wounds: Bruising on forearms and back  Labs/imaging: BG stable      This shift: PT D/O to time, thought it was the morning not night. Was able to reorient her by showing multiple clocks showing time. Around midnight was very anxious about discharging and her  having to work. Was able to calm and reassure pt.   BP at 0550 172/109 repeated BP at 0700x2 both were 170s/100s PRN metoprolol given.     CIWA: 1, 1, 1, 1      Plan: Discharge home with 24hr surveillance by .       Continue to monitor and follow POC

## 2022-02-03 ENCOUNTER — PATIENT OUTREACH (OUTPATIENT)
Dept: CARE COORDINATION | Facility: CLINIC | Age: 62
End: 2022-02-03
Payer: COMMERCIAL

## 2022-02-03 ASSESSMENT — ACTIVITIES OF DAILY LIVING (ADL): DEPENDENT_IADLS:: CLEANING;COOKING;LAUNDRY

## 2022-02-03 NOTE — DISCHARGE SUMMARY
Occupational Therapy Discharge Summary    Reason for therapy discharge:    Discharged to home with home therapy.    Progress towards therapy goal(s). See goals on Care Plan in [facility] electronic health record for goal details.  Goals partially met.  Barriers to achieving goals:   discharge from facility.    Therapy recommendation(s):    Continued therapy is recommended.  Rationale/Recommendations:  Initial recommendation of TCU, pt elected to d/c home w/ 24/7 assist and  OT services. Pt will benefit from HH OT to incr safety & IND w/ I/ADL..

## 2022-02-03 NOTE — PROGRESS NOTES
Clinic Care Coordination Contact  [facility]/Voicemail    Referral Source: IP Handoff    Clinical Data: Care Coordinator Outreach  Hospital follow up - Acute encephalopathy, upper gastrointestinal bleed secondary to severe esophagitis    - Follow up with PCP within 7 days to evaluate medication change and for hospital follow-up. (not scheduled)  - The following labs/tests are recommended: comprehensive metabolic panel and complete blood count.  - gastro EGD (not scheduled)  - OP PT/OT (not scheduled)  - walker DME order  - If you wish to access any Substance Use Disorder resources, call [facility] line, [phone]. (not scheduled)    START taking:  famotidine (PEPCID)  Start taking on: February 3, 2022  gabapentin (NEURONTIN)  sucralfate (CARAFATE)    CHANGE how you take:  hydrOXYzine (ATARAX)    Outreach attempted x 1.  Left message on patient's voicemail with call back information and requested return call.    Plan: Care Coordinator will try to reach patient again in 1-2 business days.    NABIL Woodall   Social Work Clinic Care Coordinator   [facility]  [phone]  [email or web address]

## 2022-02-04 ENCOUNTER — TELEPHONE (OUTPATIENT)
Dept: FAMILY MEDICINE | Facility: CLINIC | Age: 62
End: 2022-02-04

## 2022-02-04 ENCOUNTER — PATIENT OUTREACH (OUTPATIENT)
Dept: NURSING | Facility: CLINIC | Age: 62
End: 2022-02-04
Payer: COMMERCIAL

## 2022-02-04 DIAGNOSIS — F10.20 ALCOHOLISM (H): Primary | ICD-10-CM

## 2022-02-04 DIAGNOSIS — R53.81 PHYSICAL DECONDITIONING: ICD-10-CM

## 2022-02-04 DIAGNOSIS — K92.2 UGIB (UPPER GASTROINTESTINAL BLEED): ICD-10-CM

## 2022-02-04 ASSESSMENT — ACTIVITIES OF DAILY LIVING (ADL): DEPENDENT_IADLS:: CLEANING;COOKING;LAUNDRY

## 2022-02-04 NOTE — LETTER
[facility] CARE COORDINATION  [facility]  [address] [place] [address]    February 4, 2022    Mihaela Vance  [address] [address]      Dear Mihaela,    I am a clinic care coordinator who works with RON Lora CNP at [facility]. I wanted to thank you for spending the time to talk with me.  Below is a description of clinic care coordination and how I can further assist you.      The clinic care coordination team is made up of a registered nurse,  and community health worker who understand the health care system. The goal of clinic care coordination is to help you manage your health and improve access to the health care system in the most efficient manner. The team can assist you in meeting your health care goals by providing education, coordinating services, strengthening the communication among your providers and supporting you with any resource needs.    Please feel free to contact the Community Health Worker at [phone] with any questions or concerns. We are focused on providing you with the highest-quality healthcare experience possible and that all starts with you.     Sincerely,     NABIL Woodall   Social Work Clinic Care Coordinator   [facility]

## 2022-02-04 NOTE — TELEPHONE ENCOUNTER
"I don't see any home care orders on file. Hospital entered outpatient PT/OT orders but noted they recommended home care in therapy notes. I gave the pt the phone # for OP PT/OT scheduling, pt called, they noted \"Pt wants home therapy, said home care orders were already placed. Transferred her to home care/gave her their number.\"    PCP - can you enter home care orders?     Pt declined to schedule hospital follow up PCP OV when we spoke.     Patricia Landry, NABIL   Social Work Clinic Care Coordinator   [facility]  "

## 2022-02-04 NOTE — PROGRESS NOTES
Clinic Care Coordination Contact    Clinic Care Coordination Contact  OUTREACH with Post Discharge Assessment    Referral Information:  Referral Source: IP Handoff    Primary Diagnosis: CD    Chief Complaint   Patient presents with     Clinic Care Coordination - Post Hospital     Acute encephalopathy, upper gastrointestinal bleed secondary to severe esophagitis        Universal Utilization:   Clinic Utilization  Difficulty keeping appointments: No  Compliance Concerns: Yes  No-Show Concerns: No  No PCP office visit in Past Year: No    Utilization    Hospital Admissions  1             ED Visits  1             No Show Count (past year)  1                Current as of: 2/3/2022  2:21 PM              Clinical Concerns:  Current Medical Concerns: Pt presented to [facility] ED 1/26/22. Pt transferred and was hospitalized at [facility] 1/27/22 to 2/2/22 for UGIB, alcoholism, physical deconditioning.     Patient Active Problem List   Diagnosis     Family history of diabetes mellitus     CARDIOVASCULAR SCREENING; LDL GOAL LESS THAN 160     Anxiety     Panic disorder     Hyponatremia     Hypertension, goal below 140/90     History of anemia     Fatty liver/ ?cirrhosis     Hyperlipidemia LDL goal <100     GI bleed     Chronic pain syndrome     Thiamine deficiency neuropathy     Iron deficiency anemia due to chronic blood loss     CKD (chronic kidney disease) stage 3, GFR 30-59 ml/min (H)     UGIB (upper gastrointestinal bleed)     Alcoholism (H)        Current Behavioral Concerns: Mental health, alcohol use      Education Provided to patient: AVS review, therapy orders      Health Maintenance Reviewed: Due/Overdue     Health Maintenance Due   Topic Date Due     URINE DRUG SCREEN  Never done     Pneumococcal Vaccine: Pediatrics (0 to 5 Years) and At-Risk Patients (6 to 64 Years) (1 of 2 - PPSV23) Never done     ZOSTER IMMUNIZATION (1 of 2) Never done     MICROALBUMIN  04/22/2016     LIPID  08/22/2019     MAMMO SCREENING   09/05/2020     COVID-19 Vaccine (3 - Booster for Moderna series) 10/26/2021     PHQ-2  01/01/2022     HPV TEST  07/21/2022     PAP  07/21/2022       Clinical Pathway: None    Admission:    Admission Date: 01/26/22   Reason for Admission: GI bleed and hypotension  Discharge:   Discharge Date: 02/02/22  Discharge Diagnosis: UGIB, alcoholism, physical deconditioning    Enrollment  Primary Care Care Coordination Status: Declined    Discharge Assessment  How are you doing now that you are home?: Fine  How are your symptoms? (Red Flag symptoms escalate to triage hotline per guidelines): Improved  Does the patient have their discharge instructions? : Yes  Does the patient have questions regarding their discharge instructions? : No  Does the patient have all of their medications?: Yes  Do you have questions regarding any of your medications? : No  Discharge follow-up appointment scheduled within 14 calendar days? : No  Is patient agreeable to assistance with scheduling? : No    Pt reported she had a few complaints. CC offered Patient Relations phone #, pt declined.     Pt reported she was discharged with medications that were either too expensive or she does not need. Says they didn't run some through her insurance. Says there we some issues with dosage changes while in hospital and they filled the wrong one. Some medications were too expensive. Pt says she does not need Sucralfate (cost $165), can take Tums instead. She will call pharmacy about these concerns.     Pt reported her income source is social security. Pt reported her  needs to get back to work.    Pt reported she has not gotten a call from PT scheduling. Epic shows they called 2/3/22. CC gave pt # on order for scheduling - [phone].     Inquired if pt would like to schedule PCP follow up appt as recommended in AVS. Pt declined.     Pt noted that hospital hasn't done her billing yet and they 'need to run hospital bills through insurance.' CC let pt  "know they wouldn't have ran this yet, she discharged 2/2/22, and there no balance on file in [employer]. We have [employer] on file for billing.     Gave 24 hr nurse line #[phone]    AVS:  Therapy recommendation(s):    Continued therapy is recommended.  Rationale/Recommendations:  Initial recommendation of TCU, pt elected to d/c home w/ 24/7 assist and HH OT services. Pt will benefit from HH OT to incr safety & IND w/ I/ADLs.    Follow-up Appointments:  - Follow up with PCP brit 7 days to evaluate medication change and for hospital follow- up. (not scheduled)  - The following labs/tests are recommended: comprehensive metabolic panel and complete blood count.      IP CM consult 1/31/22: Pt lives with her long-term S/O, Jeremy, in a split-level house in [place], [place]; 2STE, 12 steps to go upstairs, 8 steps to go downstairs. At baseline, pt was independent with bathing (bed baths), dressing, medication and financial management; was able to do stairs with railing; drove self. Jeremy assists with housekeeping, laundry, meal prep, and outside work. Jeremy works in [employer] full-time from 7:30 a.m. to 4:00 p.m. Pt will utilize a FWW and 4WW as needed (at night mostly); shower chair and straight cane also in the home.  JOY spoke to Jeremy on the phone, verified information given by pt. Jeremy expressed his concerns that pt will start drinking again and cause another hospitalization. Jeremy states that pt would improve functionally if she did not drink and ate healthier. Jeremy espressed understanding that quitting drinking is something that pt has to decide for herself. JOY told Jeremy that today pt reports she is still interested in OP CD Tx. Pt also states that she is NOT interested in TCU. Pt is open to HH \"if they're forcing me.\" Jeremy stated that he would be able to stay home with pt  for the first few days upon discharge.    Addiction Team SW consult 2/1/22: Pt states her main goal at this time is to discharge; she " "would like to discharge today.  No current goals regarding her JASWINDER.  She states the plan is to have home health care come into her home to \"make sure I can be safe alone\".  She perceives this service is being 'forced' onto her but she voices agreement to this type of care.    We discussed specific outpatient JASWINDER resources.  Pt states she has \"tried all that and I'm just done with it\".  She reports that she has seen a psychiatrist and was put on a \"heavy duty antidepressant\" but stopped taking the medication due to side effects; she states she did not feel herself on this medication.  She also reports a strange encounter with the psychiatrist where the psychiatrist was telling her that 'people' were listening to their conversation via a radio and pt reports being very put off by their conversation so does not want to see a psychiatrist ever again.  Pt also states she has talked with multiple therapists, went to AA meetings, and went to CD treatment.  At this time, she reports not wanting 'to do any of it.  I'm done with all that.\"    Writer offered to put the [employer] [facility] number on pt's discharge instructions and she is agreeable to this, in case she changes her mind about accessing services.    Medication Management:  Medication review status: not reviewed in detail, pt plans to call pharmacy to discuss concerns with duplicates and cost.    Functional Status:  Dependent ADLs: Ambulation-walker (as needed)  Dependent IADLs: Cleaning,Cooking,Laundry    Living Situation:  Current living arrangement: I live in a private home with spouse  Type of residence: Private home - stairs    Lifestyle & Psychosocial Needs:    Social Determinants of Health     Tobacco Use: Low Risk      Smoking Tobacco Use: Never Smoker     Smokeless Tobacco Use: Never Used   Alcohol Use: Not on file   Financial Resource Strain: Not on file   Food Insecurity: Not on file   Transportation Needs: Not on file "   Physical Activity: Not on file   Stress: Not on file   Social Connections: Not on file   Intimate Partner Violence: Not on file   Depression: Not at risk     PHQ-2 Score: 0   Housing Stability: Not on file        Amish or spiritual beliefs that impact treatment: No  Mental health DX: Yes  Mental health DX how managed: Medication  Mental health management concern: No  Chemical Dependency Status: Current Concern    Care Coordinator has reviewed patient's Social Determinants of Health (SDoH) on this date. Upon review, changes were not made.      Resources and Interventions:  Current Resources:   Community Resources: Financial/Insurance,Other (OP PT/OT)  Supplies used at home: None  Equipment Currently Used at Home: cane, straight  Employment Status: disabled    Advance Care Plan/Directive  Advanced Care Plans/Directives on file: No    Referrals Placed: None     Patient/Caregiver understanding: Pt reports understanding and denies any additional questions or concerns at this times. JOY SHAIKH engaged in AIDET communication during encounter.    Plan: No further outreaches will be made at this time unless a new referral is made or a change in the pt's status occurs.     Patient was provided with this writer's contact information and encouraged to call with any questions or concerns. Pt to call and schedule PT/OT appt.     JOY SHAIKH will mail care coordination introduction letter to patient.    NABIL Woodall   Social Work Clinic Care Coordinator   [facility]  [phone]  [email or web address]

## 2022-02-04 NOTE — TELEPHONE ENCOUNTER
Franci from [facility] calling  Patient needs home care, however [facility] cannot see her due to capacity  Patient stated frustration about this    Erika Landry spoke with patient this morning  Franci states she will call Erika to discuss    Ginette Akhtar RN on 2/4/2022 at 12:28 PM

## 2022-02-05 ENCOUNTER — TELEPHONE (OUTPATIENT)
Dept: FAMILY MEDICINE | Facility: CLINIC | Age: 62
End: 2022-02-05
Payer: COMMERCIAL

## 2022-02-05 NOTE — TELEPHONE ENCOUNTER
Reason for Call:  Other     Detailed comments: HANNA RECEIVED NEW HOME CARE REFERRAL TODAY FOR PT. SHE IS CALLING TO SAY THEY ARE NOT ABLE TO ACCEPT REFERRAL DUE TO CAPACITY, AND ASKS A NEW REFERRAL BE SENT ELSEWHERE    Phone Number Patient can be reached at: Other phone number:   [phone]    Best Time: ANYTIME, NO CALL BACK EXPECTED     Can we leave a detailed message on this number? YES    Call taken on 2/5/2022 at 11:13 AM by Safia Dominguez

## 2022-02-08 ENCOUNTER — PATIENT OUTREACH (OUTPATIENT)
Dept: CARE COORDINATION | Facility: CLINIC | Age: 62
End: 2022-02-08
Payer: COMMERCIAL

## 2022-02-08 ENCOUNTER — TELEPHONE (OUTPATIENT)
Dept: FAMILY MEDICINE | Facility: CLINIC | Age: 62
End: 2022-02-08
Payer: COMMERCIAL

## 2022-02-08 NOTE — TELEPHONE ENCOUNTER
Message left on Dolores's VM saying Kylah Sánchez CNP will be following pt in home care. Yue Lezama RN

## 2022-02-08 NOTE — PROGRESS NOTES
Clinic Care Coordination Contact  Care Team Conversations    Care coordination received request from clinic to assist in finding pt a new home care agency as [facility] cannot accept referral. Referred for RN/PT/OT.    JOY CC made call to [facility] and left message for intake team.     JOY CC made call to [facility], spoke to Génesis in intake. They do not take [employer].     JOY CC made call to [facility]. They take her insurance. Have some availability in that area. Fax to #[phone].    JOY SHAIKH faxed [facility] referral to [facility]. Awaiting response.     JOY CC made call to [facility]. They received fax. Staff will review and call back. Received call back. They can take referral but are about 48 hrs out. They will call pt once they are done processing referral.     Plan: No further outreaches will be made at this time unless a new referral is made or a change in the pt's status occurs.     Patricia Landry, [facility]   Social Work Clinic Care Coordinator   [facility]  [phone]  [email or web address]

## 2022-02-08 NOTE — TELEPHONE ENCOUNTER
[facility] pt's [facility]. They will call pt later today. If needed, their # is [phone].    NABIL Woodall   Social Work Clinic Care Coordinator   [facility]

## 2022-02-08 NOTE — TELEPHONE ENCOUNTER
Care coordination will search for alt home care agency. See separate encounter.     NABIL Woodall   Social Work Clinic Care Coordinator   [facility]

## 2022-02-08 NOTE — TELEPHONE ENCOUNTER
Reason for Call:  Other     Detailed comments: Dolores with [facility] is calling and wanting [facility] to following this Pt?      Phone Number Patient can be reached at: Other phone number:  Dolores is at [phone]    Best Time: any    Can we leave a detailed message on this number? YES    Call taken on 2/8/2022 at 12:02 PM by Yue Antonio

## 2022-02-09 NOTE — PROGRESS NOTES
JOY CC received voicemail from [employer]. She called pt to set up initial visits for RN/PT/OT and pt is now refusing home care. Said she does not want it. Referral canceled.     Care coordination will not be following up any further. Outpatient therapies and home care therapies were offered/referred to and pt has declined both.     Routing to PCP as FYI.     CINDY Woodall   Social Work Clinic Care Coordinator   [facility]

## 2022-02-09 NOTE — TELEPHONE ENCOUNTER
Update: JOY CC received voicemail from [email or web address]. She called pt to set up initial visits for RN/PT/OT and pt is now refusing home care. Said she does not want it. Referral canceled.      Care coordination will not be following up any further. Outpatient therapies and home care therapies were offered/referred to and pt has declined both.     NABIL Woodall   Social Work Clinic Care Coordinator   [facility]

## 2022-02-24 ENCOUNTER — DOCUMENTATION ONLY (OUTPATIENT)
Dept: OTHER | Facility: CLINIC | Age: 62
End: 2022-02-24
Payer: COMMERCIAL

## 2022-03-28 DIAGNOSIS — M51.369 DDD (DEGENERATIVE DISC DISEASE), LUMBAR: ICD-10-CM

## 2022-10-12 DIAGNOSIS — K92.2 GASTROINTESTINAL HEMORRHAGE, UNSPECIFIED GASTROINTESTINAL HEMORRHAGE TYPE: ICD-10-CM

## 2022-10-13 RX ORDER — OMEPRAZOLE 40 MG/1
40 CAPSULE, DELAYED RELEASE ORAL DAILY
Qty: 90 CAPSULE | Refills: 3 | Status: SHIPPED | OUTPATIENT
Start: 2022-10-13 | End: 2023-03-23

## 2022-10-13 NOTE — TELEPHONE ENCOUNTER
"Requested Prescriptions   Pending Prescriptions Disp Refills    omeprazole (PRILOSEC) 40 MG DR capsule [Pharmacy Med Name: omeprazole 40 mg capsule,delayed release] 90 capsule 3     Sig: Take 1 capsule (40 mg) by mouth daily       PPI Protocol Failed - 10/12/2022  8:26 AM        Failed - Recent (12 mo) or future (30 days) visit within the authorizing provider's specialty     Patient has had an office visit with the authorizing provider or a provider within the authorizing providers department within the previous 12 mos or has a future within next 30 days. See \"Patient Info\" tab in inbasket, or \"Choose Columns\" in Meds & Orders section of the refill encounter.              Passed - Not on Clopidogrel (unless Pantoprazole ordered)        Passed - No diagnosis of osteoporosis on record        Passed - Medication is active on med list        Passed - Patient is age 18 or older        Passed - No active pregnacy on record        Passed - No positive pregnancy test in past 12 months             "

## 2022-12-22 DIAGNOSIS — I10 ESSENTIAL HYPERTENSION WITH GOAL BLOOD PRESSURE LESS THAN 140/90: ICD-10-CM

## 2022-12-26 RX ORDER — ATENOLOL 50 MG/1
50 TABLET ORAL DAILY
Qty: 90 TABLET | Refills: 0 | Status: SHIPPED | OUTPATIENT
Start: 2022-12-26 | End: 2023-03-23

## 2023-01-01 ENCOUNTER — VIRTUAL VISIT (OUTPATIENT)
Dept: FAMILY MEDICINE | Facility: CLINIC | Age: 63
End: 2023-01-01
Payer: COMMERCIAL

## 2023-01-01 ENCOUNTER — ANESTHESIA (OUTPATIENT)
Dept: GASTROENTEROLOGY | Facility: CLINIC | Age: 63
DRG: 064 | End: 2023-01-01
Payer: COMMERCIAL

## 2023-01-01 ENCOUNTER — TELEPHONE (OUTPATIENT)
Dept: FAMILY MEDICINE | Facility: CLINIC | Age: 63
End: 2023-01-01
Payer: COMMERCIAL

## 2023-01-01 ENCOUNTER — HOSPITAL ENCOUNTER (INPATIENT)
Facility: CLINIC | Age: 63
LOS: 3 days | Discharge: HOME | DRG: 064 | End: 2023-08-27
Attending: EMERGENCY MEDICINE | Admitting: STUDENT IN AN ORGANIZED HEALTH CARE EDUCATION/TRAINING PROGRAM
Payer: COMMERCIAL

## 2023-01-01 ENCOUNTER — NURSE TRIAGE (OUTPATIENT)
Dept: FAMILY MEDICINE | Facility: CLINIC | Age: 63
End: 2023-01-01
Payer: COMMERCIAL

## 2023-01-01 ENCOUNTER — ALLIED HEALTH/NURSE VISIT (OUTPATIENT)
Dept: FAMILY MEDICINE | Facility: CLINIC | Age: 63
End: 2023-01-01
Payer: COMMERCIAL

## 2023-01-01 ENCOUNTER — APPOINTMENT (OUTPATIENT)
Dept: CT IMAGING | Facility: CLINIC | Age: 63
DRG: 064 | End: 2023-01-01
Attending: STUDENT IN AN ORGANIZED HEALTH CARE EDUCATION/TRAINING PROGRAM
Payer: COMMERCIAL

## 2023-01-01 ENCOUNTER — APPOINTMENT (OUTPATIENT)
Dept: CT IMAGING | Facility: CLINIC | Age: 63
DRG: 064 | End: 2023-01-01
Attending: EMERGENCY MEDICINE
Payer: COMMERCIAL

## 2023-01-01 ENCOUNTER — PRE VISIT (OUTPATIENT)
Dept: NEUROSURGERY | Facility: CLINIC | Age: 63
End: 2023-01-01
Payer: COMMERCIAL

## 2023-01-01 ENCOUNTER — ANESTHESIA EVENT (OUTPATIENT)
Dept: GASTROENTEROLOGY | Facility: CLINIC | Age: 63
DRG: 064 | End: 2023-01-01
Payer: COMMERCIAL

## 2023-01-01 ENCOUNTER — APPOINTMENT (OUTPATIENT)
Dept: GENERAL RADIOLOGY | Facility: CLINIC | Age: 63
DRG: 064 | End: 2023-01-01
Payer: COMMERCIAL

## 2023-01-01 ENCOUNTER — APPOINTMENT (OUTPATIENT)
Dept: ULTRASOUND IMAGING | Facility: CLINIC | Age: 63
DRG: 064 | End: 2023-01-01
Attending: STUDENT IN AN ORGANIZED HEALTH CARE EDUCATION/TRAINING PROGRAM
Payer: COMMERCIAL

## 2023-01-01 ENCOUNTER — TELEPHONE (OUTPATIENT)
Dept: FAMILY MEDICINE | Facility: CLINIC | Age: 63
End: 2023-01-01

## 2023-01-01 ENCOUNTER — LAB (OUTPATIENT)
Dept: LAB | Facility: CLINIC | Age: 63
End: 2023-01-01
Payer: COMMERCIAL

## 2023-01-01 ENCOUNTER — APPOINTMENT (OUTPATIENT)
Dept: PHYSICAL THERAPY | Facility: CLINIC | Age: 63
DRG: 064 | End: 2023-01-01
Payer: COMMERCIAL

## 2023-01-01 ENCOUNTER — HOSPITAL ENCOUNTER (OUTPATIENT)
Dept: NEUROLOGY | Facility: CLINIC | Age: 63
Discharge: HOME | End: 2023-08-27
Attending: INTERNAL MEDICINE
Payer: COMMERCIAL

## 2023-01-01 ENCOUNTER — PATIENT OUTREACH (OUTPATIENT)
Dept: CARE COORDINATION | Facility: CLINIC | Age: 63
End: 2023-01-01
Payer: COMMERCIAL

## 2023-01-01 ENCOUNTER — APPOINTMENT (OUTPATIENT)
Dept: MRI IMAGING | Facility: CLINIC | Age: 63
DRG: 064 | End: 2023-01-01
Attending: EMERGENCY MEDICINE
Payer: COMMERCIAL

## 2023-01-01 VITALS — SYSTOLIC BLOOD PRESSURE: 138 MMHG | DIASTOLIC BLOOD PRESSURE: 94 MMHG | OXYGEN SATURATION: 95 % | HEART RATE: 110 BPM

## 2023-01-01 VITALS
TEMPERATURE: 98.6 F | RESPIRATION RATE: 18 BRPM | DIASTOLIC BLOOD PRESSURE: 69 MMHG | WEIGHT: 124.12 LBS | HEART RATE: 78 BPM | OXYGEN SATURATION: 98 % | HEIGHT: 66 IN | SYSTOLIC BLOOD PRESSURE: 98 MMHG | BODY MASS INDEX: 19.95 KG/M2

## 2023-01-01 VITALS — DIASTOLIC BLOOD PRESSURE: 95 MMHG | HEART RATE: 68 BPM | SYSTOLIC BLOOD PRESSURE: 138 MMHG

## 2023-01-01 DIAGNOSIS — K92.2 UGIB (UPPER GASTROINTESTINAL BLEED): Primary | ICD-10-CM

## 2023-01-01 DIAGNOSIS — F10.20 ALCOHOLISM (H): ICD-10-CM

## 2023-01-01 DIAGNOSIS — K92.2 UGIB (UPPER GASTROINTESTINAL BLEED): ICD-10-CM

## 2023-01-01 DIAGNOSIS — S06.5XAA SUBDURAL HEMATOMA (H): ICD-10-CM

## 2023-01-01 DIAGNOSIS — K92.2 GASTROINTESTINAL HEMORRHAGE, UNSPECIFIED GASTROINTESTINAL HEMORRHAGE TYPE: ICD-10-CM

## 2023-01-01 DIAGNOSIS — Z01.30 BLOOD PRESSURE CHECK: Primary | ICD-10-CM

## 2023-01-01 DIAGNOSIS — N18.31 STAGE 3A CHRONIC KIDNEY DISEASE (H): ICD-10-CM

## 2023-01-01 DIAGNOSIS — N18.31 STAGE 3A CHRONIC KIDNEY DISEASE (H): Primary | ICD-10-CM

## 2023-01-01 DIAGNOSIS — K92.2 UPPER GI BLEED: ICD-10-CM

## 2023-01-01 DIAGNOSIS — I10 ESSENTIAL HYPERTENSION WITH GOAL BLOOD PRESSURE LESS THAN 140/90: ICD-10-CM
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
## 2023-01-01 LAB
ABO/RH(D): NORMAL
ALBUMIN MFR UR ELPH: 15.5 MG/DL
ALBUMIN SERPL BCG-MCNC: 3.1 G/DL (ref 3.5–5.2)
ALBUMIN SERPL BCG-MCNC: 4.3 G/DL (ref 3.5–5.2)
ALBUMIN SERPL BCG-MCNC: 4.8 G/DL (ref 3.5–5.2)
ALBUMIN UR-MCNC: NEGATIVE MG/DL
ALP SERPL-CCNC: 67 U/L (ref 35–104)
ALP SERPL-CCNC: 84 U/L (ref 35–104)
ALP SERPL-CCNC: 91 U/L (ref 35–104)
ALT SERPL W P-5'-P-CCNC: 18 U/L (ref 0–50)
ALT SERPL W P-5'-P-CCNC: 18 U/L (ref 0–50)
ALT SERPL W P-5'-P-CCNC: 20 U/L (ref 0–50)
AMMONIA PLAS-SCNC: 15 UMOL/L (ref 11–51)
ANION GAP SERPL CALCULATED.3IONS-SCNC: 12 MMOL/L (ref 7–15)
ANION GAP SERPL CALCULATED.3IONS-SCNC: 18 MMOL/L (ref 7–15)
ANION GAP SERPL CALCULATED.3IONS-SCNC: 29 MMOL/L (ref 7–15)
ANION GAP SERPL CALCULATED.3IONS-SCNC: 6 MMOL/L (ref 7–15)
ANION GAP SERPL CALCULATED.3IONS-SCNC: 7 MMOL/L (ref 7–15)
ANION GAP SERPL CALCULATED.3IONS-SCNC: 7 MMOL/L (ref 7–15)
ANTIBODY SCREEN: NEGATIVE
APPEARANCE UR: CLEAR
AST SERPL W P-5'-P-CCNC: 31 U/L (ref 0–45)
AST SERPL W P-5'-P-CCNC: 35 U/L (ref 0–45)
AST SERPL W P-5'-P-CCNC: 36 U/L (ref 0–45)
BASOPHILS # BLD AUTO: 0 10E3/UL (ref 0–0.2)
BASOPHILS # BLD AUTO: 0.1 10E3/UL (ref 0–0.2)
BASOPHILS NFR BLD AUTO: 0 %
BASOPHILS NFR BLD AUTO: 1 %
BILIRUB SERPL-MCNC: 0.5 MG/DL
BILIRUB SERPL-MCNC: 0.6 MG/DL
BILIRUB SERPL-MCNC: 0.9 MG/DL
BILIRUB UR QL STRIP: NEGATIVE
BLD PROD TYP BPU: NORMAL
BLD PROD TYP BPU: NORMAL
BLOOD COMPONENT TYPE: NORMAL
BLOOD COMPONENT TYPE: NORMAL
BUN SERPL-MCNC: 14.4 MG/DL (ref 8–23)
BUN SERPL-MCNC: 21.9 MG/DL (ref 8–23)
BUN SERPL-MCNC: 23 MG/DL (ref 8–23)
BUN SERPL-MCNC: 53.6 MG/DL (ref 8–23)
BUN SERPL-MCNC: 76.9 MG/DL (ref 8–23)
BUN SERPL-MCNC: 80.8 MG/DL (ref 8–23)
CALCIUM SERPL-MCNC: 10.2 MG/DL (ref 8.8–10.2)
CALCIUM SERPL-MCNC: 11.3 MG/DL (ref 8.8–10.2)
CALCIUM SERPL-MCNC: 8.3 MG/DL (ref 8.8–10.2)
CALCIUM SERPL-MCNC: 8.4 MG/DL (ref 8.8–10.2)
CALCIUM SERPL-MCNC: 8.5 MG/DL (ref 8.8–10.2)
CALCIUM SERPL-MCNC: 9.3 MG/DL (ref 8.8–10.2)
CHLORIDE SERPL-SCNC: 100 MMOL/L (ref 98–107)
CHLORIDE SERPL-SCNC: 103 MMOL/L (ref 98–107)
CHLORIDE SERPL-SCNC: 81 MMOL/L (ref 98–107)
CHLORIDE SERPL-SCNC: 89 MMOL/L (ref 98–107)
CHLORIDE SERPL-SCNC: 94 MMOL/L (ref 98–107)
CHLORIDE SERPL-SCNC: 97 MMOL/L (ref 98–107)
CODING SYSTEM: NORMAL
CODING SYSTEM: NORMAL
COLOR UR AUTO: YELLOW
CREAT SERPL-MCNC: 1.11 MG/DL (ref 0.51–0.95)
CREAT SERPL-MCNC: 1.16 MG/DL (ref 0.51–0.95)
CREAT SERPL-MCNC: 1.22 MG/DL (ref 0.51–0.95)
CREAT SERPL-MCNC: 1.72 MG/DL (ref 0.51–0.95)
CREAT SERPL-MCNC: 2.2 MG/DL (ref 0.51–0.95)
CREAT SERPL-MCNC: 2.37 MG/DL (ref 0.51–0.95)
CREAT SERPL-MCNC: 2.79 MG/DL (ref 0.51–0.95)
CREAT UR-MCNC: 91.7 MG/DL
CREAT UR-MCNC: 92.8 MG/DL
CROSSMATCH: NORMAL
CROSSMATCH: NORMAL
DEPRECATED HCO3 PLAS-SCNC: 23 MMOL/L (ref 22–29)
DEPRECATED HCO3 PLAS-SCNC: 26 MMOL/L (ref 22–29)
DEPRECATED HCO3 PLAS-SCNC: 26 MMOL/L (ref 22–29)
DEPRECATED HCO3 PLAS-SCNC: 27 MMOL/L (ref 22–29)
DEPRECATED HCO3 PLAS-SCNC: 32 MMOL/L (ref 22–29)
DEPRECATED HCO3 PLAS-SCNC: 34 MMOL/L (ref 22–29)
EGFRCR SERPLBLD CKD-EPI 2021: 49 ML/MIN/1.73M2
EOSINOPHIL # BLD AUTO: 0 10E3/UL (ref 0–0.7)
EOSINOPHIL # BLD AUTO: 0.1 10E3/UL (ref 0–0.7)
EOSINOPHIL # BLD AUTO: 0.2 10E3/UL (ref 0–0.7)
EOSINOPHIL # BLD AUTO: 0.3 10E3/UL (ref 0–0.7)
EOSINOPHIL NFR BLD AUTO: 0 %
EOSINOPHIL NFR BLD AUTO: 1 %
EOSINOPHIL NFR BLD AUTO: 2 %
EOSINOPHIL NFR BLD AUTO: 4 %
ERYTHROCYTE [DISTWIDTH] IN BLOOD BY AUTOMATED COUNT: 17.1 % (ref 10–15)
ERYTHROCYTE [DISTWIDTH] IN BLOOD BY AUTOMATED COUNT: 17.1 % (ref 10–15)
ERYTHROCYTE [DISTWIDTH] IN BLOOD BY AUTOMATED COUNT: 17.2 % (ref 10–15)
ERYTHROCYTE [DISTWIDTH] IN BLOOD BY AUTOMATED COUNT: 17.3 % (ref 10–15)
ERYTHROCYTE [DISTWIDTH] IN BLOOD BY AUTOMATED COUNT: 17.7 % (ref 10–15)
ERYTHROCYTE [DISTWIDTH] IN BLOOD BY AUTOMATED COUNT: 19.2 % (ref 10–15)
ERYTHROCYTE [DISTWIDTH] IN BLOOD BY AUTOMATED COUNT: 19.5 % (ref 10–15)
ETHANOL SERPL-MCNC: <0.01 G/DL
FRACT EXCRET NA UR+SERPL-RTO: 1 %
GFR SERPL CREATININE-BSD FRML MDRD: 18 ML/MIN/1.73M2
GFR SERPL CREATININE-BSD FRML MDRD: 22 ML/MIN/1.73M2
GFR SERPL CREATININE-BSD FRML MDRD: 33 ML/MIN/1.73M2
GFR SERPL CREATININE-BSD FRML MDRD: 52 ML/MIN/1.73M2
GFR SERPL CREATININE-BSD FRML MDRD: 55 ML/MIN/1.73M2
GLUCOSE BLDC GLUCOMTR-MCNC: 100 MG/DL (ref 70–99)
GLUCOSE BLDC GLUCOMTR-MCNC: 101 MG/DL (ref 70–99)
GLUCOSE BLDC GLUCOMTR-MCNC: 107 MG/DL (ref 70–99)
GLUCOSE BLDC GLUCOMTR-MCNC: 132 MG/DL (ref 70–99)
GLUCOSE BLDC GLUCOMTR-MCNC: 94 MG/DL (ref 70–99)
GLUCOSE SERPL-MCNC: 103 MG/DL (ref 70–99)
GLUCOSE SERPL-MCNC: 108 MG/DL (ref 70–99)
GLUCOSE SERPL-MCNC: 113 MG/DL (ref 70–99)
GLUCOSE SERPL-MCNC: 123 MG/DL (ref 70–99)
GLUCOSE SERPL-MCNC: 210 MG/DL (ref 70–99)
GLUCOSE SERPL-MCNC: 99 MG/DL (ref 70–99)
GLUCOSE UR STRIP-MCNC: NEGATIVE MG/DL
HBA1C MFR BLD: 4.9 %
HCT VFR BLD AUTO: 20.4 % (ref 35–47)
HCT VFR BLD AUTO: 21.7 % (ref 35–47)
HCT VFR BLD AUTO: 23.1 % (ref 35–47)
HCT VFR BLD AUTO: 23.4 % (ref 35–47)
HCT VFR BLD AUTO: 25.8 % (ref 35–47)
HCT VFR BLD AUTO: 27 % (ref 35–47)
HCT VFR BLD AUTO: 33.9 % (ref 35–47)
HEMOCCULT STL QL: POSITIVE
HGB BLD-MCNC: 10.9 G/DL (ref 11.7–15.7)
HGB BLD-MCNC: 6.7 G/DL (ref 11.7–15.7)
HGB BLD-MCNC: 6.9 G/DL (ref 11.7–15.7)
HGB BLD-MCNC: 7.4 G/DL (ref 11.7–15.7)
HGB BLD-MCNC: 7.8 G/DL (ref 11.7–15.7)
HGB BLD-MCNC: 8.2 G/DL (ref 11.7–15.7)
HGB BLD-MCNC: 9.2 G/DL (ref 11.7–15.7)
HGB UR QL STRIP: NEGATIVE
HOLD SPECIMEN: NORMAL
HYALINE CASTS: 1 /LPF
IMM GRANULOCYTES # BLD: 0 10E3/UL
IMM GRANULOCYTES # BLD: 0.1 10E3/UL
IMM GRANULOCYTES # BLD: 0.1 10E3/UL
IMM GRANULOCYTES # BLD: 0.2 10E3/UL
IMM GRANULOCYTES NFR BLD: 0 %
IMM GRANULOCYTES NFR BLD: 1 %
INR PPP: 1.13 (ref 0.85–1.15)
ISSUE DATE AND TIME: NORMAL
ISSUE DATE AND TIME: NORMAL
KETONES UR STRIP-MCNC: NEGATIVE MG/DL
LACTATE SERPL-SCNC: 1.2 MMOL/L (ref 0.7–2)
LEUKOCYTE ESTERASE UR QL STRIP: NEGATIVE
LIPASE SERPL-CCNC: 20 U/L (ref 13–60)
LYMPHOCYTES # BLD AUTO: 1 10E3/UL (ref 0.8–5.3)
LYMPHOCYTES # BLD AUTO: 1.5 10E3/UL (ref 0.8–5.3)
LYMPHOCYTES # BLD AUTO: 1.6 10E3/UL (ref 0.8–5.3)
LYMPHOCYTES # BLD AUTO: 1.8 10E3/UL (ref 0.8–5.3)
LYMPHOCYTES NFR BLD AUTO: 14 %
LYMPHOCYTES NFR BLD AUTO: 18 %
LYMPHOCYTES NFR BLD AUTO: 22 %
LYMPHOCYTES NFR BLD AUTO: 7 %
MAGNESIUM SERPL-MCNC: 1.5 MG/DL (ref 1.7–2.3)
MAGNESIUM SERPL-MCNC: 1.6 MG/DL (ref 1.7–2.3)
MAGNESIUM SERPL-MCNC: 2 MG/DL (ref 1.7–2.3)
MAGNESIUM SERPL-MCNC: 2.7 MG/DL (ref 1.7–2.3)
MCH RBC QN AUTO: 28.3 PG (ref 26.5–33)
MCH RBC QN AUTO: 28.6 PG (ref 26.5–33)
MCH RBC QN AUTO: 29.5 PG (ref 26.5–33)
MCH RBC QN AUTO: 29.9 PG (ref 26.5–33)
MCH RBC QN AUTO: 30 PG (ref 26.5–33)
MCH RBC QN AUTO: 30.1 PG (ref 26.5–33)
MCH RBC QN AUTO: 30.2 PG (ref 26.5–33)
MCHC RBC AUTO-ENTMCNC: 31.8 G/DL (ref 31.5–36.5)
MCHC RBC AUTO-ENTMCNC: 31.8 G/DL (ref 31.5–36.5)
MCHC RBC AUTO-ENTMCNC: 32 G/DL (ref 31.5–36.5)
MCHC RBC AUTO-ENTMCNC: 32.2 G/DL (ref 31.5–36.5)
MCHC RBC AUTO-ENTMCNC: 32.8 G/DL (ref 31.5–36.5)
MCHC RBC AUTO-ENTMCNC: 33.3 G/DL (ref 31.5–36.5)
MCHC RBC AUTO-ENTMCNC: 34.1 G/DL (ref 31.5–36.5)
MCV RBC AUTO: 87 FL (ref 78–100)
MCV RBC AUTO: 89 FL (ref 78–100)
MCV RBC AUTO: 89 FL (ref 78–100)
MCV RBC AUTO: 90 FL (ref 78–100)
MCV RBC AUTO: 92 FL (ref 78–100)
MCV RBC AUTO: 94 FL (ref 78–100)
MCV RBC AUTO: 94 FL (ref 78–100)
MONOCYTES # BLD AUTO: 0.7 10E3/UL (ref 0–1.3)
MONOCYTES # BLD AUTO: 0.7 10E3/UL (ref 0–1.3)
MONOCYTES # BLD AUTO: 1.2 10E3/UL (ref 0–1.3)
MONOCYTES # BLD AUTO: 1.2 10E3/UL (ref 0–1.3)
MONOCYTES NFR BLD AUTO: 10 %
MONOCYTES NFR BLD AUTO: 12 %
MONOCYTES NFR BLD AUTO: 17 %
MONOCYTES NFR BLD AUTO: 3 %
NEUTROPHILS # BLD AUTO: 19.5 10E3/UL (ref 1.6–8.3)
NEUTROPHILS # BLD AUTO: 4.2 10E3/UL (ref 1.6–8.3)
NEUTROPHILS # BLD AUTO: 5.3 10E3/UL (ref 1.6–8.3)
NEUTROPHILS # BLD AUTO: 6.6 10E3/UL (ref 1.6–8.3)
NEUTROPHILS NFR BLD AUTO: 56 %
NEUTROPHILS NFR BLD AUTO: 67 %
NEUTROPHILS NFR BLD AUTO: 74 %
NEUTROPHILS NFR BLD AUTO: 89 %
NITRATE UR QL: NEGATIVE
NRBC # BLD AUTO: 0 10E3/UL
NRBC # BLD AUTO: 0 10E3/UL
NRBC # BLD AUTO: 0.1 10E3/UL
NRBC BLD AUTO-RTO: 0 /100
PATH REPORT.COMMENTS IMP SPEC: NORMAL
PATH REPORT.FINAL DX SPEC: NORMAL
PATH REPORT.GROSS SPEC: NORMAL
PATH REPORT.MICROSCOPIC SPEC OTHER STN: NORMAL
PATH REPORT.MICROSCOPIC SPEC OTHER STN: NORMAL
PATH REPORT.RELEVANT HX SPEC: NORMAL
PH UR STRIP: 7 [PH] (ref 5–7)
PHOSPHATE SERPL-MCNC: 2.6 MG/DL (ref 2.5–4.5)
PHOSPHATE SERPL-MCNC: 6.5 MG/DL (ref 2.5–4.5)
PHOTO IMAGE: NORMAL
PLATELET # BLD AUTO: 244 10E3/UL (ref 150–450)
PLATELET # BLD AUTO: 264 10E3/UL (ref 150–450)
PLATELET # BLD AUTO: 280 10E3/UL (ref 150–450)
PLATELET # BLD AUTO: 342 10E3/UL (ref 150–450)
PLATELET # BLD AUTO: 348 10E3/UL (ref 150–450)
PLATELET # BLD AUTO: 361 10E3/UL (ref 150–450)
PLATELET # BLD AUTO: 523 10E3/UL (ref 150–450)
POTASSIUM SERPL-SCNC: 2.9 MMOL/L (ref 3.4–5.3)
POTASSIUM SERPL-SCNC: 3.2 MMOL/L (ref 3.4–5.3)
POTASSIUM SERPL-SCNC: 3.2 MMOL/L (ref 3.4–5.3)
POTASSIUM SERPL-SCNC: 3.3 MMOL/L (ref 3.4–5.3)
POTASSIUM SERPL-SCNC: 3.6 MMOL/L (ref 3.4–5.3)
POTASSIUM SERPL-SCNC: 3.7 MMOL/L (ref 3.4–5.3)
POTASSIUM SERPL-SCNC: 3.7 MMOL/L (ref 3.4–5.3)
POTASSIUM SERPL-SCNC: 3.8 MMOL/L (ref 3.4–5.3)
PROCALCITONIN SERPL IA-MCNC: 0.69 NG/ML
PROT SERPL-MCNC: 5.7 G/DL (ref 6.4–8.3)
PROT SERPL-MCNC: 7.9 G/DL (ref 6.4–8.3)
PROT SERPL-MCNC: 8.5 G/DL (ref 6.4–8.3)
PROT/CREAT 24H UR: 0.17 MG/MG CR (ref 0–0.2)
RADIOLOGIST FLAGS: ABNORMAL
RBC # BLD AUTO: 2.31 10E6/UL (ref 3.8–5.2)
RBC # BLD AUTO: 2.34 10E6/UL (ref 3.8–5.2)
RBC # BLD AUTO: 2.47 10E6/UL (ref 3.8–5.2)
RBC # BLD AUTO: 2.59 10E6/UL (ref 3.8–5.2)
RBC # BLD AUTO: 2.9 10E6/UL (ref 3.8–5.2)
RBC # BLD AUTO: 3.05 10E6/UL (ref 3.8–5.2)
RBC # BLD AUTO: 3.69 10E6/UL (ref 3.8–5.2)
RBC URINE: <1 /HPF
SODIUM SERPL-SCNC: 134 MMOL/L (ref 136–145)
SODIUM SERPL-SCNC: 135 MMOL/L (ref 135–145)
SODIUM SERPL-SCNC: 135 MMOL/L (ref 136–145)
SODIUM SERPL-SCNC: 135 MMOL/L (ref 136–145)
SODIUM SERPL-SCNC: 136 MMOL/L (ref 136–145)
SODIUM SERPL-SCNC: 136 MMOL/L (ref 136–145)
SODIUM SERPL-SCNC: 138 MMOL/L (ref 136–145)
SODIUM UR-SCNC: 61 MMOL/L
SP GR UR STRIP: 1.02 (ref 1–1.03)
SPECIMEN EXPIRATION DATE: NORMAL
SQUAMOUS EPITHELIAL: 1 /HPF
UNIT ABO/RH: NORMAL
UNIT ABO/RH: NORMAL
UNIT NUMBER: NORMAL
UNIT NUMBER: NORMAL
UNIT STATUS: NORMAL
UNIT STATUS: NORMAL
UNIT TYPE ISBT: 6200
UNIT TYPE ISBT: 6200
UPPER GI ENDOSCOPY: NORMAL
UROBILINOGEN UR STRIP-MCNC: NORMAL MG/DL
WBC # BLD AUTO: 12.4 10E3/UL (ref 4–11)
WBC # BLD AUTO: 18 10E3/UL (ref 4–11)
WBC # BLD AUTO: 18.2 10E3/UL (ref 4–11)
WBC # BLD AUTO: 21.9 10E3/UL (ref 4–11)
WBC # BLD AUTO: 7.1 10E3/UL (ref 4–11)
WBC # BLD AUTO: 7.3 10E3/UL (ref 4–11)
WBC # BLD AUTO: 9.9 10E3/UL (ref 4–11)
WBC URINE: 2 /HPF

## 2023-01-01 PROCEDURE — 83735 ASSAY OF MAGNESIUM: CPT | Performed by: EMERGENCY MEDICINE

## 2023-01-01 PROCEDURE — 36430 TRANSFUSION BLD/BLD COMPNT: CPT | Performed by: EMERGENCY MEDICINE

## 2023-01-01 PROCEDURE — 120N000004 HC R&B MS OVERFLOW

## 2023-01-01 PROCEDURE — 85027 COMPLETE CBC AUTOMATED: CPT | Performed by: EMERGENCY MEDICINE

## 2023-01-01 PROCEDURE — 84156 ASSAY OF PROTEIN URINE: CPT | Performed by: STUDENT IN AN ORGANIZED HEALTH CARE EDUCATION/TRAINING PROGRAM

## 2023-01-01 PROCEDURE — 36415 COLL VENOUS BLD VENIPUNCTURE: CPT

## 2023-01-01 PROCEDURE — 99223 1ST HOSP IP/OBS HIGH 75: CPT

## 2023-01-01 PROCEDURE — 96375 TX/PRO/DX INJ NEW DRUG ADDON: CPT | Performed by: EMERGENCY MEDICINE

## 2023-01-01 PROCEDURE — C9113 INJ PANTOPRAZOLE SODIUM, VIA: HCPCS | Mod: JZ

## 2023-01-01 PROCEDURE — 84132 ASSAY OF SERUM POTASSIUM: CPT | Performed by: STUDENT IN AN ORGANIZED HEALTH CARE EDUCATION/TRAINING PROGRAM

## 2023-01-01 PROCEDURE — 36415 COLL VENOUS BLD VENIPUNCTURE: CPT | Performed by: EMERGENCY MEDICINE

## 2023-01-01 PROCEDURE — 43239 EGD BIOPSY SINGLE/MULTIPLE: CPT | Performed by: SURGERY

## 2023-01-01 PROCEDURE — 85025 COMPLETE CBC W/AUTO DIFF WBC: CPT | Performed by: STUDENT IN AN ORGANIZED HEALTH CARE EDUCATION/TRAINING PROGRAM

## 2023-01-01 PROCEDURE — 85610 PROTHROMBIN TIME: CPT | Performed by: EMERGENCY MEDICINE

## 2023-01-01 PROCEDURE — 250N000011 HC RX IP 250 OP 636: Performed by: EMERGENCY MEDICINE

## 2023-01-01 PROCEDURE — P9016 RBC LEUKOCYTES REDUCED: HCPCS | Performed by: EMERGENCY MEDICINE

## 2023-01-01 PROCEDURE — 250N000009 HC RX 250: Performed by: NURSE ANESTHETIST, CERTIFIED REGISTERED

## 2023-01-01 PROCEDURE — 370N000017 HC ANESTHESIA TECHNICAL FEE, PER MIN: Performed by: SURGERY

## 2023-01-01 PROCEDURE — 250N000011 HC RX IP 250 OP 636: Performed by: NURSE ANESTHETIST, CERTIFIED REGISTERED

## 2023-01-01 PROCEDURE — 84100 ASSAY OF PHOSPHORUS: CPT

## 2023-01-01 PROCEDURE — 70450 CT HEAD/BRAIN W/O DYE: CPT

## 2023-01-01 PROCEDURE — 83690 ASSAY OF LIPASE: CPT | Performed by: EMERGENCY MEDICINE

## 2023-01-01 PROCEDURE — 250N000013 HC RX MED GY IP 250 OP 250 PS 637

## 2023-01-01 PROCEDURE — 99442 PR PHYSICIAN TELEPHONE EVALUATION 11-20 MIN: CPT | Mod: 93 | Performed by: NURSE PRACTITIONER

## 2023-01-01 PROCEDURE — 96366 THER/PROPH/DIAG IV INF ADDON: CPT | Performed by: EMERGENCY MEDICINE

## 2023-01-01 PROCEDURE — 36415 COLL VENOUS BLD VENIPUNCTURE: CPT | Performed by: STUDENT IN AN ORGANIZED HEALTH CARE EDUCATION/TRAINING PROGRAM

## 2023-01-01 PROCEDURE — 99232 SBSQ HOSP IP/OBS MODERATE 35: CPT | Performed by: STUDENT IN AN ORGANIZED HEALTH CARE EDUCATION/TRAINING PROGRAM

## 2023-01-01 PROCEDURE — 88341 IMHCHEM/IMCYTCHM EA ADD ANTB: CPT | Mod: 26 | Performed by: PATHOLOGY

## 2023-01-01 PROCEDURE — 84145 PROCALCITONIN (PCT): CPT

## 2023-01-01 PROCEDURE — 250N000009 HC RX 250: Performed by: EMERGENCY MEDICINE

## 2023-01-01 PROCEDURE — 99207 PR NO BILLABLE SERVICE THIS VISIT: CPT | Performed by: INTERNAL MEDICINE

## 2023-01-01 PROCEDURE — 80048 BASIC METABOLIC PNL TOTAL CA: CPT | Performed by: STUDENT IN AN ORGANIZED HEALTH CARE EDUCATION/TRAINING PROGRAM

## 2023-01-01 PROCEDURE — 83036 HEMOGLOBIN GLYCOSYLATED A1C: CPT

## 2023-01-01 PROCEDURE — 255N000002 HC RX 255 OP 636: Performed by: EMERGENCY MEDICINE

## 2023-01-01 PROCEDURE — 88342 IMHCHEM/IMCYTCHM 1ST ANTB: CPT | Mod: 26 | Performed by: PATHOLOGY

## 2023-01-01 PROCEDURE — 84295 ASSAY OF SERUM SODIUM: CPT | Performed by: STUDENT IN AN ORGANIZED HEALTH CARE EDUCATION/TRAINING PROGRAM

## 2023-01-01 PROCEDURE — 96365 THER/PROPH/DIAG IV INF INIT: CPT | Mod: 59 | Performed by: EMERGENCY MEDICINE

## 2023-01-01 PROCEDURE — 258N000003 HC RX IP 258 OP 636: Performed by: EMERGENCY MEDICINE

## 2023-01-01 PROCEDURE — 84300 ASSAY OF URINE SODIUM: CPT | Performed by: STUDENT IN AN ORGANIZED HEALTH CARE EDUCATION/TRAINING PROGRAM

## 2023-01-01 PROCEDURE — 88312 SPECIAL STAINS GROUP 1: CPT | Mod: 26 | Performed by: PATHOLOGY

## 2023-01-01 PROCEDURE — 200N000001 HC R&B ICU

## 2023-01-01 PROCEDURE — 82077 ASSAY SPEC XCP UR&BREATH IA: CPT | Performed by: EMERGENCY MEDICINE

## 2023-01-01 PROCEDURE — 250N000013 HC RX MED GY IP 250 OP 250 PS 637: Performed by: STUDENT IN AN ORGANIZED HEALTH CARE EDUCATION/TRAINING PROGRAM

## 2023-01-01 PROCEDURE — 83605 ASSAY OF LACTIC ACID: CPT

## 2023-01-01 PROCEDURE — 70553 MRI BRAIN STEM W/O & W/DYE: CPT

## 2023-01-01 PROCEDURE — 83735 ASSAY OF MAGNESIUM: CPT | Performed by: STUDENT IN AN ORGANIZED HEALTH CARE EDUCATION/TRAINING PROGRAM

## 2023-01-01 PROCEDURE — 250N000011 HC RX IP 250 OP 636: Mod: JZ

## 2023-01-01 PROCEDURE — 80053 COMPREHEN METABOLIC PANEL: CPT

## 2023-01-01 PROCEDURE — 99207 PR NO CHARGE NURSE ONLY: CPT

## 2023-01-01 PROCEDURE — 85025 COMPLETE CBC W/AUTO DIFF WBC: CPT | Performed by: EMERGENCY MEDICINE

## 2023-01-01 PROCEDURE — 83735 ASSAY OF MAGNESIUM: CPT

## 2023-01-01 PROCEDURE — 80053 COMPREHEN METABOLIC PANEL: CPT | Performed by: EMERGENCY MEDICINE

## 2023-01-01 PROCEDURE — 999N000052 EEG

## 2023-01-01 PROCEDURE — 74176 CT ABD & PELVIS W/O CONTRAST: CPT

## 2023-01-01 PROCEDURE — 85025 COMPLETE CBC W/AUTO DIFF WBC: CPT

## 2023-01-01 PROCEDURE — 99285 EMERGENCY DEPT VISIT HI MDM: CPT | Mod: 25 | Performed by: EMERGENCY MEDICINE

## 2023-01-01 PROCEDURE — 82565 ASSAY OF CREATININE: CPT | Performed by: STUDENT IN AN ORGANIZED HEALTH CARE EDUCATION/TRAINING PROGRAM

## 2023-01-01 PROCEDURE — 250N000011 HC RX IP 250 OP 636: Mod: JZ | Performed by: STUDENT IN AN ORGANIZED HEALTH CARE EDUCATION/TRAINING PROGRAM

## 2023-01-01 PROCEDURE — 0DB58ZX EXCISION OF ESOPHAGUS, VIA NATURAL OR ARTIFICIAL OPENING ENDOSCOPIC, DIAGNOSTIC: ICD-10-PCS | Performed by: SURGERY

## 2023-01-01 PROCEDURE — 99291 CRITICAL CARE FIRST HOUR: CPT | Performed by: EMERGENCY MEDICINE

## 2023-01-01 PROCEDURE — 86923 COMPATIBILITY TEST ELECTRIC: CPT | Performed by: HOSPITALIST

## 2023-01-01 PROCEDURE — 99233 SBSQ HOSP IP/OBS HIGH 50: CPT | Performed by: STUDENT IN AN ORGANIZED HEALTH CARE EDUCATION/TRAINING PROGRAM

## 2023-01-01 PROCEDURE — 258N000003 HC RX IP 258 OP 636: Performed by: STUDENT IN AN ORGANIZED HEALTH CARE EDUCATION/TRAINING PROGRAM

## 2023-01-01 PROCEDURE — 84100 ASSAY OF PHOSPHORUS: CPT | Performed by: EMERGENCY MEDICINE

## 2023-01-01 PROCEDURE — 86850 RBC ANTIBODY SCREEN: CPT | Performed by: EMERGENCY MEDICINE

## 2023-01-01 PROCEDURE — 86901 BLOOD TYPING SEROLOGIC RH(D): CPT | Performed by: EMERGENCY MEDICINE

## 2023-01-01 PROCEDURE — 76770 US EXAM ABDO BACK WALL COMP: CPT

## 2023-01-01 PROCEDURE — 85027 COMPLETE CBC AUTOMATED: CPT

## 2023-01-01 PROCEDURE — 82272 OCCULT BLD FECES 1-3 TESTS: CPT | Performed by: EMERGENCY MEDICINE

## 2023-01-01 PROCEDURE — 82140 ASSAY OF AMMONIA: CPT | Performed by: EMERGENCY MEDICINE

## 2023-01-01 PROCEDURE — 258N000003 HC RX IP 258 OP 636

## 2023-01-01 PROCEDURE — 99239 HOSP IP/OBS DSCHRG MGMT >30: CPT | Performed by: INTERNAL MEDICINE

## 2023-01-01 PROCEDURE — A9585 GADOBUTROL INJECTION: HCPCS | Performed by: EMERGENCY MEDICINE

## 2023-01-01 PROCEDURE — 97161 PT EVAL LOW COMPLEX 20 MIN: CPT | Mod: GP

## 2023-01-01 PROCEDURE — 86923 COMPATIBILITY TEST ELECTRIC: CPT | Performed by: EMERGENCY MEDICINE

## 2023-01-01 PROCEDURE — 96376 TX/PRO/DX INJ SAME DRUG ADON: CPT | Performed by: EMERGENCY MEDICINE

## 2023-01-01 PROCEDURE — 88305 TISSUE EXAM BY PATHOLOGIST: CPT | Mod: TC | Performed by: SURGERY

## 2023-01-01 PROCEDURE — 88305 TISSUE EXAM BY PATHOLOGIST: CPT | Mod: 26 | Performed by: PATHOLOGY

## 2023-01-01 PROCEDURE — 81001 URINALYSIS AUTO W/SCOPE: CPT | Performed by: EMERGENCY MEDICINE

## 2023-01-01 PROCEDURE — 71045 X-RAY EXAM CHEST 1 VIEW: CPT

## 2023-01-01 PROCEDURE — C9113 INJ PANTOPRAZOLE SODIUM, VIA: HCPCS | Mod: JZ | Performed by: EMERGENCY MEDICINE

## 2023-01-01 PROCEDURE — 86850 RBC ANTIBODY SCREEN: CPT | Performed by: STUDENT IN AN ORGANIZED HEALTH CARE EDUCATION/TRAINING PROGRAM

## 2023-01-01 PROCEDURE — P9016 RBC LEUKOCYTES REDUCED: HCPCS | Performed by: HOSPITALIST

## 2023-01-01 RX ORDER — ONDANSETRON 2 MG/ML
INJECTION INTRAMUSCULAR; INTRAVENOUS PRN
Status: DISCONTINUED | OUTPATIENT
Start: 2023-01-01 | End: 2023-01-01

## 2023-01-01 RX ORDER — SODIUM CHLORIDE, CALCIUM CHLORIDE, AND POTASSIUM CHLORIDE .86; .033; .03 G/100ML; G/100ML; G/100ML
INJECTION, SOLUTION INTRAVENOUS CONTINUOUS
Status: DISCONTINUED | OUTPATIENT
Start: 2023-01-01 | End: 2023-01-01

## 2023-01-01 RX ORDER — ONDANSETRON 2 MG/ML
4 INJECTION INTRAMUSCULAR; INTRAVENOUS EVERY 30 MIN PRN
Status: DISCONTINUED | OUTPATIENT
Start: 2023-01-01 | End: 2023-01-01

## 2023-01-01 RX ORDER — AMLODIPINE BESYLATE 2.5 MG/1
2.5 TABLET ORAL DAILY
Qty: 90 TABLET | Refills: 3 | OUTPATIENT
Start: 2023-01-01

## 2023-01-01 RX ORDER — FOLIC ACID 1 MG/1
1 TABLET ORAL DAILY
Status: DISCONTINUED | OUTPATIENT
Start: 2023-01-01 | End: 2023-01-01

## 2023-01-01 RX ORDER — LIDOCAINE HYDROCHLORIDE 20 MG/ML
INJECTION, SOLUTION INFILTRATION; PERINEURAL PRN
Status: DISCONTINUED | OUTPATIENT
Start: 2023-01-01 | End: 2023-01-01

## 2023-01-01 RX ORDER — ONDANSETRON 2 MG/ML
4 INJECTION INTRAMUSCULAR; INTRAVENOUS ONCE
Status: COMPLETED | OUTPATIENT
Start: 2023-01-01 | End: 2023-01-01

## 2023-01-01 RX ORDER — SODIUM CHLORIDE 9 MG/ML
INJECTION, SOLUTION INTRAVENOUS CONTINUOUS
Status: DISCONTINUED | OUTPATIENT
Start: 2023-01-01 | End: 2023-01-01

## 2023-01-01 RX ORDER — POTASSIUM CHLORIDE 1500 MG/1
20 TABLET, EXTENDED RELEASE ORAL ONCE
Status: COMPLETED | OUTPATIENT
Start: 2023-01-01 | End: 2023-01-01

## 2023-01-01 RX ORDER — DEXTROSE MONOHYDRATE 25 G/50ML
25-50 INJECTION, SOLUTION INTRAVENOUS
Status: DISCONTINUED | OUTPATIENT
Start: 2023-01-01 | End: 2023-01-01 | Stop reason: HOSPADM

## 2023-01-01 RX ORDER — HYDRALAZINE HYDROCHLORIDE 20 MG/ML
10 INJECTION INTRAMUSCULAR; INTRAVENOUS EVERY 6 HOURS PRN
Status: DISCONTINUED | OUTPATIENT
Start: 2023-01-01 | End: 2023-01-01 | Stop reason: HOSPADM

## 2023-01-01 RX ORDER — LIDOCAINE 40 MG/G
CREAM TOPICAL
Status: DISCONTINUED | OUTPATIENT
Start: 2023-01-01 | End: 2023-01-01

## 2023-01-01 RX ORDER — FOLIC ACID 1 MG/1
1 TABLET ORAL DAILY
Status: COMPLETED | OUTPATIENT
Start: 2023-01-01 | End: 2023-01-01

## 2023-01-01 RX ORDER — IOPAMIDOL 755 MG/ML
63 INJECTION, SOLUTION INTRAVASCULAR ONCE
Status: DISCONTINUED | OUTPATIENT
Start: 2023-01-01 | End: 2023-01-01

## 2023-01-01 RX ORDER — SUCRALFATE ORAL 1 G/10ML
1 SUSPENSION ORAL
Qty: 520 ML | Refills: 0 | Status: SHIPPED | OUTPATIENT
Start: 2023-01-01 | End: 2023-01-01

## 2023-01-01 RX ORDER — BUSPIRONE HYDROCHLORIDE 15 MG/1
30 TABLET ORAL 2 TIMES DAILY
Status: DISCONTINUED | OUTPATIENT
Start: 2023-01-01 | End: 2023-01-01 | Stop reason: HOSPADM

## 2023-01-01 RX ORDER — PANTOPRAZOLE SODIUM 40 MG/1
40 TABLET, DELAYED RELEASE ORAL
Qty: 60 TABLET | Refills: 0 | Status: SHIPPED | OUTPATIENT
Start: 2023-01-01 | End: 2023-01-01 | Stop reason: SINTOL

## 2023-01-01 RX ORDER — ACETAMINOPHEN 325 MG/1
650 TABLET ORAL EVERY 4 HOURS PRN
Status: DISCONTINUED | OUTPATIENT
Start: 2023-01-01 | End: 2023-01-01 | Stop reason: HOSPADM

## 2023-01-01 RX ORDER — OMEPRAZOLE 40 MG/1
40 CAPSULE, DELAYED RELEASE ORAL DAILY
Qty: 90 CAPSULE | Refills: 3 | Status: SHIPPED | OUTPATIENT
Start: 2023-01-01

## 2023-01-01 RX ORDER — SUCRALFATE ORAL 1 G/10ML
1 SUSPENSION ORAL
Status: DISCONTINUED | OUTPATIENT
Start: 2023-01-01 | End: 2023-01-01 | Stop reason: HOSPADM

## 2023-01-01 RX ORDER — OXYCODONE HYDROCHLORIDE 5 MG/1
5 TABLET ORAL
Status: CANCELLED | OUTPATIENT
Start: 2023-01-01

## 2023-01-01 RX ORDER — ACETAMINOPHEN 500 MG
500-1000 TABLET ORAL EVERY 6 HOURS PRN
COMMUNITY
Start: 2023-01-01

## 2023-01-01 RX ORDER — AMLODIPINE BESYLATE 2.5 MG/1
2.5 TABLET ORAL DAILY
Status: DISCONTINUED | OUTPATIENT
Start: 2023-01-01 | End: 2023-01-01 | Stop reason: HOSPADM

## 2023-01-01 RX ORDER — PANTOPRAZOLE SODIUM 40 MG/1
40 TABLET, DELAYED RELEASE ORAL
Qty: 60 TABLET | Refills: 0 | Status: SHIPPED | OUTPATIENT
Start: 2023-01-01 | End: 2023-01-01

## 2023-01-01 RX ORDER — ATENOLOL 50 MG/1
50 TABLET ORAL DAILY
Qty: 90 TABLET | Refills: 0 | Status: SHIPPED | OUTPATIENT
Start: 2023-01-01

## 2023-01-01 RX ORDER — MULTIPLE VITAMINS W/ MINERALS TAB 9MG-400MCG
1 TAB ORAL DAILY
Status: COMPLETED | OUTPATIENT
Start: 2023-01-01 | End: 2023-01-01

## 2023-01-01 RX ORDER — KETAMINE HYDROCHLORIDE 10 MG/ML
INJECTION INTRAMUSCULAR; INTRAVENOUS PRN
Status: DISCONTINUED | OUTPATIENT
Start: 2023-01-01 | End: 2023-01-01

## 2023-01-01 RX ORDER — NICOTINE POLACRILEX 4 MG
15-30 LOZENGE BUCCAL
Status: DISCONTINUED | OUTPATIENT
Start: 2023-01-01 | End: 2023-01-01 | Stop reason: HOSPADM

## 2023-01-01 RX ORDER — SODIUM CHLORIDE, SODIUM LACTATE, POTASSIUM CHLORIDE, CALCIUM CHLORIDE 600; 310; 30; 20 MG/100ML; MG/100ML; MG/100ML; MG/100ML
INJECTION, SOLUTION INTRAVENOUS CONTINUOUS
Status: DISCONTINUED | OUTPATIENT
Start: 2023-01-01 | End: 2023-01-01

## 2023-01-01 RX ORDER — ACETAMINOPHEN 650 MG/1
650 SUPPOSITORY RECTAL EVERY 4 HOURS PRN
Status: DISCONTINUED | OUTPATIENT
Start: 2023-01-01 | End: 2023-01-01 | Stop reason: HOSPADM

## 2023-01-01 RX ORDER — ONDANSETRON 4 MG/1
4 TABLET, ORALLY DISINTEGRATING ORAL EVERY 30 MIN PRN
Status: DISCONTINUED | OUTPATIENT
Start: 2023-01-01 | End: 2023-01-01

## 2023-01-01 RX ORDER — AMOXICILLIN 250 MG
2 CAPSULE ORAL 2 TIMES DAILY
Status: DISCONTINUED | OUTPATIENT
Start: 2023-01-01 | End: 2023-01-01 | Stop reason: HOSPADM

## 2023-01-01 RX ORDER — GADOBUTROL 604.72 MG/ML
5.5 INJECTION INTRAVENOUS ONCE
Status: COMPLETED | OUTPATIENT
Start: 2023-01-01 | End: 2023-01-01

## 2023-01-01 RX ORDER — MULTIPLE VITAMINS W/ MINERALS TAB 9MG-400MCG
1 TAB ORAL DAILY
Status: DISCONTINUED | OUTPATIENT
Start: 2023-01-01 | End: 2023-01-01

## 2023-01-01 RX ORDER — PANTOPRAZOLE SODIUM 40 MG/1
40 TABLET, DELAYED RELEASE ORAL
Status: DISCONTINUED | OUTPATIENT
Start: 2023-01-01 | End: 2023-01-01 | Stop reason: HOSPADM

## 2023-01-01 RX ORDER — PROCHLORPERAZINE 25 MG
25 SUPPOSITORY, RECTAL RECTAL EVERY 12 HOURS PRN
Status: DISCONTINUED | OUTPATIENT
Start: 2023-01-01 | End: 2023-01-01 | Stop reason: HOSPADM

## 2023-01-01 RX ORDER — POTASSIUM CHLORIDE 7.45 MG/ML
10 INJECTION INTRAVENOUS
Status: COMPLETED | OUTPATIENT
Start: 2023-01-01 | End: 2023-01-01

## 2023-01-01 RX ORDER — ONDANSETRON 4 MG/1
4 TABLET, ORALLY DISINTEGRATING ORAL EVERY 6 HOURS PRN
Status: DISCONTINUED | OUTPATIENT
Start: 2023-01-01 | End: 2023-01-01 | Stop reason: HOSPADM

## 2023-01-01 RX ORDER — ONDANSETRON 2 MG/ML
4 INJECTION INTRAMUSCULAR; INTRAVENOUS EVERY 6 HOURS PRN
Status: DISCONTINUED | OUTPATIENT
Start: 2023-01-01 | End: 2023-01-01 | Stop reason: HOSPADM

## 2023-01-01 RX ORDER — AMLODIPINE BESYLATE 2.5 MG/1
2.5 TABLET ORAL DAILY
Qty: 90 TABLET | Refills: 0 | Status: SHIPPED | OUTPATIENT
Start: 2023-01-01

## 2023-01-01 RX ORDER — GLYCOPYRROLATE 0.2 MG/ML
INJECTION, SOLUTION INTRAMUSCULAR; INTRAVENOUS PRN
Status: DISCONTINUED | OUTPATIENT
Start: 2023-01-01 | End: 2023-01-01

## 2023-01-01 RX ORDER — PROPOFOL 10 MG/ML
INJECTION, EMULSION INTRAVENOUS PRN
Status: DISCONTINUED | OUTPATIENT
Start: 2023-01-01 | End: 2023-01-01

## 2023-01-01 RX ORDER — FERROUS SULFATE 325(65) MG
325 TABLET ORAL
Status: DISCONTINUED | OUTPATIENT
Start: 2023-01-01 | End: 2023-01-01 | Stop reason: HOSPADM

## 2023-01-01 RX ORDER — OXYCODONE HYDROCHLORIDE 5 MG/1
10 TABLET ORAL
Status: CANCELLED | OUTPATIENT
Start: 2023-01-01

## 2023-01-01 RX ORDER — OMEPRAZOLE 40 MG/1
40 CAPSULE, DELAYED RELEASE ORAL DAILY
Qty: 90 CAPSULE | Refills: 3 | Status: SHIPPED | OUTPATIENT
Start: 2023-01-01 | End: 2023-01-01

## 2023-01-01 RX ORDER — ATENOLOL 50 MG/1
50 TABLET ORAL DAILY
Status: DISCONTINUED | OUTPATIENT
Start: 2023-01-01 | End: 2023-01-01 | Stop reason: HOSPADM

## 2023-01-01 RX ORDER — AMOXICILLIN 250 MG
1 CAPSULE ORAL 2 TIMES DAILY
Status: DISCONTINUED | OUTPATIENT
Start: 2023-01-01 | End: 2023-01-01 | Stop reason: HOSPADM

## 2023-01-01 RX ORDER — PROCHLORPERAZINE MALEATE 10 MG
10 TABLET ORAL EVERY 6 HOURS PRN
Status: DISCONTINUED | OUTPATIENT
Start: 2023-01-01 | End: 2023-01-01 | Stop reason: HOSPADM

## 2023-01-01 RX ORDER — SODIUM CHLORIDE, SODIUM LACTATE, POTASSIUM CHLORIDE, CALCIUM CHLORIDE 600; 310; 30; 20 MG/100ML; MG/100ML; MG/100ML; MG/100ML
INJECTION, SOLUTION INTRAVENOUS CONTINUOUS
Status: DISCONTINUED | OUTPATIENT
Start: 2023-01-01 | End: 2023-01-01 | Stop reason: HOSPADM

## 2023-01-01 RX ORDER — LIDOCAINE 40 MG/G
CREAM TOPICAL
Status: DISCONTINUED | OUTPATIENT
Start: 2023-01-01 | End: 2023-01-01 | Stop reason: HOSPADM

## 2023-01-01 RX ORDER — PANTOPRAZOLE SODIUM 40 MG/1
40 TABLET, DELAYED RELEASE ORAL
Status: DISCONTINUED | OUTPATIENT
Start: 2023-01-01 | End: 2023-01-01

## 2023-01-01 RX ADMIN — BUSPIRONE HYDROCHLORIDE 30 MG: 15 TABLET ORAL at 20:26

## 2023-01-01 RX ADMIN — BUSPIRONE HYDROCHLORIDE 30 MG: 15 TABLET ORAL at 08:46

## 2023-01-01 RX ADMIN — THIAMINE HCL TAB 100 MG 100 MG: 100 TAB at 08:05

## 2023-01-01 RX ADMIN — SENNOSIDES AND DOCUSATE SODIUM 1 TABLET: 50; 8.6 TABLET ORAL at 20:27

## 2023-01-01 RX ADMIN — MIDAZOLAM 1 MG: 1 INJECTION INTRAMUSCULAR; INTRAVENOUS at 12:46

## 2023-01-01 RX ADMIN — KETAMINE HYDROCHLORIDE 30 MG: 10 INJECTION INTRAMUSCULAR; INTRAVENOUS at 12:45

## 2023-01-01 RX ADMIN — FERROUS SULFATE TAB 325 MG (65 MG ELEMENTAL FE) 325 MG: 325 (65 FE) TAB at 07:45

## 2023-01-01 RX ADMIN — SUCRALFATE 1 G: 1 SUSPENSION ORAL at 21:52

## 2023-01-01 RX ADMIN — PANTOPRAZOLE SODIUM 40 MG: 40 TABLET, DELAYED RELEASE ORAL at 05:44

## 2023-01-01 RX ADMIN — ACETAMINOPHEN 650 MG: 325 TABLET, FILM COATED ORAL at 09:32

## 2023-01-01 RX ADMIN — FOLIC ACID 1 MG: 1 TABLET ORAL at 16:12

## 2023-01-01 RX ADMIN — FERROUS SULFATE TAB 325 MG (65 MG ELEMENTAL FE) 325 MG: 325 (65 FE) TAB at 08:46

## 2023-01-01 RX ADMIN — PANTOPRAZOLE SODIUM 40 MG: 40 INJECTION, POWDER, FOR SOLUTION INTRAVENOUS at 15:18

## 2023-01-01 RX ADMIN — POTASSIUM CHLORIDE 10 MEQ: 7.46 INJECTION, SOLUTION INTRAVENOUS at 06:18

## 2023-01-01 RX ADMIN — THIAMINE HCL TAB 100 MG 100 MG: 100 TAB at 08:46

## 2023-01-01 RX ADMIN — BUSPIRONE HYDROCHLORIDE 30 MG: 15 TABLET ORAL at 20:16

## 2023-01-01 RX ADMIN — ACETAMINOPHEN 650 MG: 325 TABLET, FILM COATED ORAL at 08:09

## 2023-01-01 RX ADMIN — SODIUM CHLORIDE, POTASSIUM CHLORIDE, SODIUM LACTATE AND CALCIUM CHLORIDE: 600; 310; 30; 20 INJECTION, SOLUTION INTRAVENOUS at 00:51

## 2023-01-01 RX ADMIN — PANTOPRAZOLE SODIUM 40 MG: 40 INJECTION, POWDER, FOR SOLUTION INTRAVENOUS at 03:51

## 2023-01-01 RX ADMIN — PROPOFOL 20 MG: 10 INJECTION, EMULSION INTRAVENOUS at 12:45

## 2023-01-01 RX ADMIN — GADOBUTROL 5.5 ML: 604.72 INJECTION INTRAVENOUS at 11:39

## 2023-01-01 RX ADMIN — MIDAZOLAM 1 MG: 1 INJECTION INTRAMUSCULAR; INTRAVENOUS at 12:35

## 2023-01-01 RX ADMIN — ONDANSETRON 4 MG: 2 INJECTION INTRAMUSCULAR; INTRAVENOUS at 04:26

## 2023-01-01 RX ADMIN — MULTIPLE VITAMINS W/ MINERALS TAB 1 TABLET: TAB at 08:05

## 2023-01-01 RX ADMIN — SUCRALFATE 1 G: 1 SUSPENSION ORAL at 05:44

## 2023-01-01 RX ADMIN — PANTOPRAZOLE SODIUM 40 MG: 40 INJECTION, POWDER, FOR SOLUTION INTRAVENOUS at 04:05

## 2023-01-01 RX ADMIN — SUCRALFATE 1 G: 1 SUSPENSION ORAL at 15:55

## 2023-01-01 RX ADMIN — LIDOCAINE HYDROCHLORIDE 60 MG: 20 INJECTION, SOLUTION INFILTRATION; PERINEURAL at 12:42

## 2023-01-01 RX ADMIN — ONDANSETRON 4 MG: 2 INJECTION INTRAMUSCULAR; INTRAVENOUS at 12:35

## 2023-01-01 RX ADMIN — KETAMINE HYDROCHLORIDE 20 MG: 10 INJECTION INTRAMUSCULAR; INTRAVENOUS at 12:42

## 2023-01-01 RX ADMIN — POTASSIUM CHLORIDE 10 MEQ: 7.46 INJECTION, SOLUTION INTRAVENOUS at 03:51

## 2023-01-01 RX ADMIN — POTASSIUM CHLORIDE 10 MEQ: 7.46 INJECTION, SOLUTION INTRAVENOUS at 07:31

## 2023-01-01 RX ADMIN — GLYCOPYRROLATE 0.1 MG: 0.2 INJECTION, SOLUTION INTRAMUSCULAR; INTRAVENOUS at 12:35

## 2023-01-01 RX ADMIN — SODIUM CHLORIDE, POTASSIUM CHLORIDE, SODIUM LACTATE AND CALCIUM CHLORIDE 500 ML: 600; 310; 30; 20 INJECTION, SOLUTION INTRAVENOUS at 12:23

## 2023-01-01 RX ADMIN — SUCRALFATE 1 G: 1 SUSPENSION ORAL at 11:09

## 2023-01-01 RX ADMIN — ATENOLOL 50 MG: 50 TABLET ORAL at 15:17

## 2023-01-01 RX ADMIN — POTASSIUM CHLORIDE 10 MEQ: 7.46 INJECTION, SOLUTION INTRAVENOUS at 05:07

## 2023-01-01 RX ADMIN — ONDANSETRON 4 MG: 2 INJECTION INTRAMUSCULAR; INTRAVENOUS at 21:08

## 2023-01-01 RX ADMIN — ONDANSETRON 4 MG: 2 INJECTION INTRAMUSCULAR; INTRAVENOUS at 06:03

## 2023-01-01 RX ADMIN — SODIUM CHLORIDE, POTASSIUM CHLORIDE, SODIUM LACTATE AND CALCIUM CHLORIDE: 600; 310; 30; 20 INJECTION, SOLUTION INTRAVENOUS at 04:14

## 2023-01-01 RX ADMIN — FOLIC ACID 1 MG: 1 TABLET ORAL at 08:46

## 2023-01-01 RX ADMIN — FERROUS SULFATE TAB 325 MG (65 MG ELEMENTAL FE) 325 MG: 325 (65 FE) TAB at 08:05

## 2023-01-01 RX ADMIN — POTASSIUM CHLORIDE 20 MEQ: 1500 TABLET, EXTENDED RELEASE ORAL at 17:50

## 2023-01-01 RX ADMIN — PANTOPRAZOLE SODIUM 40 MG: 40 TABLET, DELAYED RELEASE ORAL at 06:21

## 2023-01-01 RX ADMIN — SUCRALFATE 1 G: 1 SUSPENSION ORAL at 10:05

## 2023-01-01 RX ADMIN — BUSPIRONE HYDROCHLORIDE 30 MG: 15 TABLET ORAL at 07:45

## 2023-01-01 RX ADMIN — SODIUM CHLORIDE, POTASSIUM CHLORIDE, SODIUM LACTATE AND CALCIUM CHLORIDE 1000 ML: 600; 310; 30; 20 INJECTION, SOLUTION INTRAVENOUS at 04:04

## 2023-01-01 RX ADMIN — SODIUM CHLORIDE, POTASSIUM CHLORIDE, SODIUM LACTATE AND CALCIUM CHLORIDE: 600; 310; 30; 20 INJECTION, SOLUTION INTRAVENOUS at 15:36

## 2023-01-01 RX ADMIN — BUSPIRONE HYDROCHLORIDE 30 MG: 15 TABLET ORAL at 08:05

## 2023-01-01 RX ADMIN — MULTIPLE VITAMINS W/ MINERALS TAB 1 TABLET: TAB at 08:46

## 2023-01-01 RX ADMIN — FOLIC ACID: 5 INJECTION, SOLUTION INTRAMUSCULAR; INTRAVENOUS; SUBCUTANEOUS at 04:03

## 2023-01-01 ASSESSMENT — ACTIVITIES OF DAILY LIVING (ADL)
ADLS_ACUITY_SCORE: 32
ADLS_ACUITY_SCORE: 32
ADLS_ACUITY_SCORE: 35
CONCENTRATING,_REMEMBERING_OR_MAKING_DECISIONS_DIFFICULTY: NO
ADLS_ACUITY_SCORE: 35
TOILETING: 1-->ASSISTANCE (EQUIPMENT/PERSON) NEEDED (NOT DEVELOPMENTALLY APPROPRIATE)
WALKING_OR_CLIMBING_STAIRS_DIFFICULTY: YES
ADLS_ACUITY_SCORE: 33
ADLS_ACUITY_SCORE: 32
HEARING_DIFFICULTY_OR_DEAF: NO
ADLS_ACUITY_SCORE: 29
ADLS_ACUITY_SCORE: 28
ADLS_ACUITY_SCORE: 26
ADLS_ACUITY_SCORE: 29
TRANSFERRING: 1-->ASSISTANCE (EQUIPMENT/PERSON) NEEDED
DRESSING/BATHING_DIFFICULTY: NO
ADLS_ACUITY_SCORE: 32
TOILETING_ISSUES: YES
ADLS_ACUITY_SCORE: 33
ADLS_ACUITY_SCORE: 32
ADLS_ACUITY_SCORE: 32
ADLS_ACUITY_SCORE: 30
ADLS_ACUITY_SCORE: 33
ADLS_ACUITY_SCORE: 45
ADLS_ACUITY_SCORE: 32
ADLS_ACUITY_SCORE: 35
ADLS_ACUITY_SCORE: 30
ADLS_ACUITY_SCORE: 32
ADLS_ACUITY_SCORE: 28
ADLS_ACUITY_SCORE: 28
WALKING_OR_CLIMBING_STAIRS: AMBULATION DIFFICULTY, ASSISTANCE 1 PERSON
ADLS_ACUITY_SCORE: 28
FALL_HISTORY_WITHIN_LAST_SIX_MONTHS: NO
ADLS_ACUITY_SCORE: 33
ADLS_ACUITY_SCORE: 28
TRANSFERRING: 0-->ASSISTANCE NEEDED (DEVELOPMENTALLY APPROPRIATE)
ADLS_ACUITY_SCORE: 33
ADLS_ACUITY_SCORE: 34
ADLS_ACUITY_SCORE: 35
ADLS_ACUITY_SCORE: 32
DEPENDENT_IADLS:: CLEANING
ADLS_ACUITY_SCORE: 32
WERE_AUXILIARY_AIDS_OFFERED?: NO
CHANGE_IN_FUNCTIONAL_STATUS_SINCE_ONSET_OF_CURRENT_ILLNESS/INJURY: YES
ADLS_ACUITY_SCORE: 30
ADLS_ACUITY_SCORE: 32
ADLS_ACUITY_SCORE: 29
DIFFICULTY_EATING/SWALLOWING: NO
ADLS_ACUITY_SCORE: 26
ADLS_ACUITY_SCORE: 32
ADLS_ACUITY_SCORE: 30
DIFFICULTY_COMMUNICATING: NO
TOILETING: 1-->ASSISTANCE (EQUIPMENT/PERSON) NEEDED
DOING_ERRANDS_INDEPENDENTLY_DIFFICULTY: OTHER (SEE COMMENTS)
ADLS_ACUITY_SCORE: 28
WEAR_GLASSES_OR_BLIND: NO

## 2023-01-01 ASSESSMENT — ENCOUNTER SYMPTOMS
HEADACHES: 1
BLOOD IN STOOL: 0
PALPITATIONS: 0
DIARRHEA: 0
VOMITING: 1
HEARTBURN: 0
CHILLS: 1
DYSURIA: 0
FREQUENCY: 0
WEAKNESS: 1
ABDOMINAL PAIN: 0
NAUSEA: 1
ORTHOPNEA: 0
FEVER: 0
NERVOUS/ANXIOUS: 0
SHORTNESS OF BREATH: 0
WEIGHT LOSS: 0
WHEEZING: 0
DIZZINESS: 0
BACK PAIN: 1
COUGH: 0
HEMOPTYSIS: 0
DIAPHORESIS: 0

## 2023-01-19 DIAGNOSIS — F41.9 ANXIETY: ICD-10-CM

## 2023-01-19 RX ORDER — BUSPIRONE HYDROCHLORIDE 30 MG/1
30 TABLET ORAL 2 TIMES DAILY
Qty: 180 TABLET | Refills: 0 | Status: SHIPPED | OUTPATIENT
Start: 2023-01-19 | End: 2023-03-23

## 2023-01-19 NOTE — TELEPHONE ENCOUNTER
Mihaela notified she is due for a Medicare Wellness Exam. Scheduled her for 2/3/23. She requested a 90 day supply sent in still so I did give her that.    Jenny Cheema RN

## 2023-02-14 DIAGNOSIS — F41.9 ANXIETY: ICD-10-CM

## 2023-02-16 RX ORDER — HYDROXYZINE HYDROCHLORIDE 25 MG/1
25 TABLET, FILM COATED ORAL
Qty: 120 TABLET | Refills: 4 | OUTPATIENT
Start: 2023-02-16

## 2023-02-16 NOTE — TELEPHONE ENCOUNTER
Pending Prescriptions:                       Disp   Refills    hydrOXYzine (ATARAX) 25 MG tablet          120 ta*4        Sig: Take 1 tablet (25 mg) by mouth nightly as needed for           itching    Routing refill request to provider for review/approval because:  Patient needs to be seen because it has been more than 1 year since last office visit.  Last seen on 9/7/21    Brittani Mccabe RN  [facility]

## 2023-02-28 DIAGNOSIS — F41.9 ANXIETY: ICD-10-CM

## 2023-03-01 NOTE — TELEPHONE ENCOUNTER
"Has appointment scheduled for 3/3/23. Cancelled last appointment.      Requested Prescriptions   Pending Prescriptions Disp Refills     hydrOXYzine (ATARAX) 25 MG tablet [Pharmacy Med Name: hydroxyzine HCl 25 mg tablet] 120 tablet 4     Sig: Take 1 tablet (25 mg) by mouth nightly as needed for itching       Antihistamines Protocol Failed - 2/28/2023  2:10 PM        Failed - Recent (12 mo) or future (30 days) visit within the authorizing provider's specialty     Patient has had an office visit with the authorizing provider or a provider within the authorizing providers department within the previous 12 mos or has a future within next 30 days. See \"Patient Info\" tab in inbasket, or \"Choose Columns\" in Meds & Orders section of the refill encounter.              Passed - Patient is age 3 or older     Apply age and/or weight-based dosing for peds patients age 3 and older.    Forward request to provider for patients under the age of 3.          Passed - Medication is active on med list             "

## 2023-03-02 RX ORDER — HYDROXYZINE HYDROCHLORIDE 25 MG/1
25 TABLET, FILM COATED ORAL
Qty: 120 TABLET | Refills: 4 | Status: SHIPPED | OUTPATIENT
Start: 2023-03-02 | End: 2024-01-01

## 2023-03-23 ENCOUNTER — OFFICE VISIT (OUTPATIENT)
Dept: FAMILY MEDICINE | Facility: CLINIC | Age: 63
End: 2023-03-23
Payer: COMMERCIAL

## 2023-03-23 ENCOUNTER — TELEPHONE (OUTPATIENT)
Dept: FAMILY MEDICINE | Facility: CLINIC | Age: 63
End: 2023-03-23

## 2023-03-23 VITALS
WEIGHT: 129 LBS | SYSTOLIC BLOOD PRESSURE: 100 MMHG | HEART RATE: 83 BPM | TEMPERATURE: 97.6 F | DIASTOLIC BLOOD PRESSURE: 68 MMHG | OXYGEN SATURATION: 100 % | BODY MASS INDEX: 19.61 KG/M2 | RESPIRATION RATE: 16 BRPM

## 2023-03-23 DIAGNOSIS — F41.9 ANXIETY: ICD-10-CM

## 2023-03-23 DIAGNOSIS — E78.5 HYPERLIPIDEMIA LDL GOAL <100: ICD-10-CM

## 2023-03-23 DIAGNOSIS — D50.0 IRON DEFICIENCY ANEMIA DUE TO CHRONIC BLOOD LOSS: ICD-10-CM

## 2023-03-23 DIAGNOSIS — N18.31 STAGE 3A CHRONIC KIDNEY DISEASE (H): ICD-10-CM

## 2023-03-23 DIAGNOSIS — M51.369 DDD (DEGENERATIVE DISC DISEASE), LUMBAR: ICD-10-CM

## 2023-03-23 DIAGNOSIS — Z23 NEED FOR PROPHYLACTIC VACCINATION AND INOCULATION AGAINST INFLUENZA: ICD-10-CM

## 2023-03-23 DIAGNOSIS — Z12.31 VISIT FOR SCREENING MAMMOGRAM: ICD-10-CM

## 2023-03-23 DIAGNOSIS — E55.9 VITAMIN D DEFICIENCY: ICD-10-CM

## 2023-03-23 DIAGNOSIS — Z00.00 ENCOUNTER FOR MEDICARE ANNUAL WELLNESS EXAM: Primary | ICD-10-CM

## 2023-03-23 DIAGNOSIS — Z78.0 ASYMPTOMATIC POSTMENOPAUSAL STATUS: ICD-10-CM

## 2023-03-23 DIAGNOSIS — I10 ESSENTIAL HYPERTENSION WITH GOAL BLOOD PRESSURE LESS THAN 140/90: ICD-10-CM

## 2023-03-23 DIAGNOSIS — N18.31 STAGE 3A CHRONIC KIDNEY DISEASE (H): Primary | ICD-10-CM

## 2023-03-23 DIAGNOSIS — E83.42 HYPOMAGNESEMIA: ICD-10-CM

## 2023-03-23 DIAGNOSIS — Z23 HIGH PRIORITY FOR 2019-NCOV VACCINE: ICD-10-CM

## 2023-03-23 DIAGNOSIS — Z12.4 CERVICAL CANCER SCREENING: ICD-10-CM

## 2023-03-23 DIAGNOSIS — Z23 NEED FOR VACCINATION: ICD-10-CM

## 2023-03-23 DIAGNOSIS — K92.2 GASTROINTESTINAL HEMORRHAGE, UNSPECIFIED GASTROINTESTINAL HEMORRHAGE TYPE: ICD-10-CM

## 2023-03-23 LAB
ANION GAP SERPL CALCULATED.3IONS-SCNC: 13 MMOL/L (ref 7–15)
BUN SERPL-MCNC: 28.9 MG/DL (ref 8–23)
CALCIUM SERPL-MCNC: 10 MG/DL (ref 8.8–10.2)
CHLORIDE SERPL-SCNC: 100 MMOL/L (ref 98–107)
CHOLEST SERPL-MCNC: 226 MG/DL
CREAT SERPL-MCNC: 1.3 MG/DL (ref 0.51–0.95)
DEPRECATED CALCIDIOL+CALCIFEROL SERPL-MC: 11 UG/L (ref 20–75)
DEPRECATED HCO3 PLAS-SCNC: 24 MMOL/L (ref 22–29)
GFR SERPL CREATININE-BSD FRML MDRD: 46 ML/MIN/1.73M2
GLUCOSE SERPL-MCNC: 89 MG/DL (ref 70–99)
HDLC SERPL-MCNC: 86 MG/DL
HGB BLD-MCNC: 9.1 G/DL (ref 11.7–15.7)
LDLC SERPL CALC-MCNC: 122 MG/DL
MAGNESIUM SERPL-MCNC: 1.6 MG/DL (ref 1.7–2.3)
NONHDLC SERPL-MCNC: 140 MG/DL
POTASSIUM SERPL-SCNC: 4.1 MMOL/L (ref 3.4–5.3)
SODIUM SERPL-SCNC: 137 MMOL/L (ref 136–145)
TRIGL SERPL-MCNC: 88 MG/DL

## 2023-03-23 PROCEDURE — G0145 SCR C/V CYTO,THINLAYER,RESCR: HCPCS | Performed by: NURSE PRACTITIONER

## 2023-03-23 PROCEDURE — 99396 PREV VISIT EST AGE 40-64: CPT | Mod: 25 | Performed by: NURSE PRACTITIONER

## 2023-03-23 PROCEDURE — 83735 ASSAY OF MAGNESIUM: CPT | Performed by: NURSE PRACTITIONER

## 2023-03-23 PROCEDURE — 0134A COVID-19 VACCINE BIVALENT BOOSTER 18+ (MODERNA): CPT | Performed by: NURSE PRACTITIONER

## 2023-03-23 PROCEDURE — G0009 ADMIN PNEUMOCOCCAL VACCINE: HCPCS | Mod: 59 | Performed by: NURSE PRACTITIONER

## 2023-03-23 PROCEDURE — 91313 COVID-19 VACCINE BIVALENT BOOSTER 18+ (MODERNA): CPT | Performed by: NURSE PRACTITIONER

## 2023-03-23 PROCEDURE — 80061 LIPID PANEL: CPT | Performed by: NURSE PRACTITIONER

## 2023-03-23 PROCEDURE — 85018 HEMOGLOBIN: CPT | Performed by: NURSE PRACTITIONER

## 2023-03-23 PROCEDURE — 99214 OFFICE O/P EST MOD 30 MIN: CPT | Mod: 25 | Performed by: NURSE PRACTITIONER

## 2023-03-23 PROCEDURE — 90682 RIV4 VACC RECOMBINANT DNA IM: CPT | Performed by: NURSE PRACTITIONER

## 2023-03-23 PROCEDURE — 82306 VITAMIN D 25 HYDROXY: CPT | Performed by: NURSE PRACTITIONER

## 2023-03-23 PROCEDURE — 90677 PCV20 VACCINE IM: CPT | Performed by: NURSE PRACTITIONER

## 2023-03-23 PROCEDURE — G0008 ADMIN INFLUENZA VIRUS VAC: HCPCS | Mod: 59 | Performed by: NURSE PRACTITIONER

## 2023-03-23 PROCEDURE — 87624 HPV HI-RISK TYP POOLED RSLT: CPT | Performed by: NURSE PRACTITIONER

## 2023-03-23 PROCEDURE — 36415 COLL VENOUS BLD VENIPUNCTURE: CPT | Performed by: NURSE PRACTITIONER

## 2023-03-23 PROCEDURE — 80048 BASIC METABOLIC PNL TOTAL CA: CPT | Performed by: NURSE PRACTITIONER

## 2023-03-23 RX ORDER — ATENOLOL 50 MG/1
50 TABLET ORAL DAILY
Qty: 90 TABLET | Refills: 3 | Status: ON HOLD | OUTPATIENT
Start: 2023-03-23 | End: 2023-01-01

## 2023-03-23 RX ORDER — OMEPRAZOLE 40 MG/1
40 CAPSULE, DELAYED RELEASE ORAL DAILY
Qty: 90 CAPSULE | Refills: 3 | Status: ON HOLD | OUTPATIENT
Start: 2023-03-23 | End: 2023-01-01

## 2023-03-23 RX ORDER — BUSPIRONE HYDROCHLORIDE 30 MG/1
30 TABLET ORAL 2 TIMES DAILY
Qty: 180 TABLET | Refills: 3 | Status: SHIPPED | OUTPATIENT
Start: 2023-03-23

## 2023-03-23 ASSESSMENT — ACTIVITIES OF DAILY LIVING (ADL): CURRENT_FUNCTION: HOUSEWORK REQUIRES ASSISTANCE

## 2023-03-23 NOTE — PROGRESS NOTES
"SUBJECTIVE:   Mihaela is a 63 year old who presents for Preventive Visit.  Patient has been advised of split billing requirements and indicates understanding: Yes  Are you in the first 12 months of your Medicare coverage?  No    Healthy Habits:    In general, how would you rate your overall health?  Fair    Frequency of exercise:  None    Do you usually eat at least 4 servings of fruit and vegetables a day, include whole grains    & fiber and avoid regularly eating high fat or \"junk\" foods?  No    Taking medications regularly:  Yes    Barriers to taking medications:  None    Medication side effects:  None    Ability to successfully perform activities of daily living:  Housework requires assistance    Home Safety:  No safety concerns identified    Hearing Impairment:  Difficulty following a conversation in a noisy restaurant or crowded room, feel that people are mumbling or not speaking clearly, need to ask people to speak up or repeat themselves and difficulty understanding soft or whispered speech    In the past 6 months, have you been bothered by leaking of urine? Yes    In general, how would you rate your overall mental or emotional health?  Good      PHQ-2 Total Score:      Have you ever done Advance Care Planning? (For example, a Health Directive, POLST, or a discussion with a medical provider or your loved ones about your wishes): No, advance care planning information given to patient to review.  Patient plans to discuss their wishes with loved ones or provider.         Fall risk  Fallen 2 or more times in the past year?: Yes  Any fall with injury in the past year?: No    Cognitive Screening   1) Repeat 3 items (Leader, Season, Table)    2) Clock draw: NORMAL  3) 3 item recall: Recalls 3 objects  Results: 3 items recalled: COGNITIVE IMPAIRMENT LESS LIKELY    Mini-CogTM Copyright S Rhett. Licensed by the author for use in [facility]; reprinted with permission ([email or web address]@.[facility]). All rights reserved.  "     Do you have sleep apnea, excessive snoring or daytime drowsiness?: no    Reviewed and updated as needed this visit by clinical staff   Tobacco  Allergies  Meds              Reviewed and updated as needed this visit by Provider                 Social History     Tobacco Use     Smoking status: Never     Smokeless tobacco: Never   Substance Use Topics     Alcohol use: Yes     Comment: 2 days per week 3-4 drinks        No flowsheet data found.  Do you have a current opioid prescription? No  Do you use any other controlled substances or medications that are not prescribed by a provider? Alcohol          PROBLEMS TO ADD ON...  Medication Followup of Omeprazole    Taking Medication as prescribed: yes    Side Effects:  None    Medication Helping Symptoms:  Yes    Anxiety Follow-Up    How are you doing with your anxiety since your last visit? No change    Are you having other symptoms that might be associated with anxiety? No    Have you had a significant life event? No     Are you feeling depressed? No    Do you have any concerns with your use of alcohol or other drugs? No    Social History     Tobacco Use     Smoking status: Never     Smokeless tobacco: Never   Vaping Use     Vaping Use: Never used   Substance Use Topics     Alcohol use: Yes     Comment: 2 days per week 3-4 drinks      Drug use: No     PERCY-7 SCORE 11/20/2012 10/16/2013 9/7/2021   Total Score 0 1 -   Total Score - - 0     PHQ 9/7/2021   PHQ-9 Total Score 3   Q9: Thoughts of better off dead/self-harm past 2 weeks Not at all         Hypertension Follow-up      Do you check your blood pressure regularly outside of the clinic? No     Are you following a low salt diet? No    Are your blood pressures ever more than 140 on the top number (systolic) OR more   than 90 on the bottom number (diastolic), for example 140/90? No      Current providers sharing in care for this patient include:   Patient Care Team:  Kylah Sánchez APRN CNP as PCP - General  (Nurse Practitioner)  Ralph Aviles MD as MD (Hematology & Oncology)  Ashlie Chan, RN as  (Hematology)  Kylah Sánchez APRN CNP as Assigned PCP    The following health maintenance items are reviewed in Epic and correct as of today:  Health Maintenance   Topic Date Due     URINE DRUG SCREEN  Never done     ANNUAL REVIEW OF HM ORDERS  Never done     Pneumococcal Vaccine: Pediatrics (0 to 5 Years) and At-Risk Patients (6 to 64 Years) (1 - PCV) Never done     ZOSTER IMMUNIZATION (1 of 2) Never done     MICROALBUMIN  04/22/2016     LIPID  08/22/2019     MAMMO SCREENING  09/05/2020     COVID-19 Vaccine (3 - Booster for Moderna series) 07/21/2021     HPV TEST  07/21/2022     PAP  07/21/2022     INFLUENZA VACCINE (1) 09/01/2022     MEDICARE ANNUAL WELLNESS VISIT  09/07/2022     BMP  02/02/2023     HEMOGLOBIN  02/02/2023     COLORECTAL CANCER SCREENING  06/19/2024     DTAP/TDAP/TD IMMUNIZATION (2 - Td or Tdap) 11/26/2024     ADVANCE CARE PLANNING  02/24/2027     HEPATITIS C SCREENING  Completed     HIV SCREENING  Completed     PHQ-2 (once per calendar year)  Completed     URINALYSIS  Completed     IPV IMMUNIZATION  Aged Out     MENINGITIS IMMUNIZATION  Aged Out     BP Readings from Last 3 Encounters:   03/23/23 100/68   02/02/22 (!) 142/97   01/27/22 (!) 133/92    Wt Readings from Last 3 Encounters:   03/23/23 58.5 kg (129 lb)   02/02/22 61.6 kg (135 lb 14.4 oz)   01/26/22 59.8 kg (131 lb 12.8 oz)                  Patient Active Problem List   Diagnosis     Family history of diabetes mellitus     CARDIOVASCULAR SCREENING; LDL GOAL LESS THAN 160     Anxiety     Panic disorder     Hyponatremia     Hypertension, goal below 140/90     History of anemia     Fatty liver/ ?cirrhosis     Hyperlipidemia LDL goal <100     GI bleed     Chronic pain syndrome     Thiamine deficiency neuropathy     Iron deficiency anemia due to chronic blood loss     CKD (chronic kidney disease) stage 3, GFR 30-59 ml/min (H)      UGIB (upper gastrointestinal bleed)     Alcoholism (H)     Vitamin D deficiency     DDD (degenerative disc disease), lumbar     Past Surgical History:   Procedure Laterality Date     BONE MARROW BIOPSY, BONE SPECIMEN, NEEDLE/TROCAR N/A 6/2/2015    Procedure: BIOPSY BONE MARROW;  Surgeon: Cady Rodriguez MD;  Location: [facility]     COLONOSCOPY  12/8/2010    COLONOSCOPY performed by MADAY BADILLO at [facility]     ESOPHAGOSCOPY, GASTROSCOPY, DUODENOSCOPY (EGD), COMBINED N/A 4/24/2019    Procedure: ESOPHAGOGASTRODUODENOSCOPY, WITH BIOPSY;  Surgeon: Nic Reardon DO;  Location: [facility]     ESOPHAGOSCOPY, GASTROSCOPY, DUODENOSCOPY (EGD), COMBINED N/A 6/19/2019    Procedure: ESOPHAGOGASTRODUODENOSCOPY, WITH BIOPSY;  Surgeon: Nic Reardon DO;  Location: [facility]     ESOPHAGOSCOPY, GASTROSCOPY, DUODENOSCOPY (EGD), COMBINED N/A 1/27/2022    Procedure: ESOPHAGOGASTRODUODENOSCOPY (EGD);  Surgeon: Kofi Chan DO;  Location:  GI     ORTHOPEDIC SURGERY  4/28/2005    Left knee medial and lateral meniscal tears.       Social History     Tobacco Use     Smoking status: Never     Smokeless tobacco: Never   Substance Use Topics     Alcohol use: Yes     Comment: 2 days per week 3-4 drinks      Family History   Problem Relation Age of Onset     Cardiovascular Brother 40        3 heart attacks, last MI 55 years old     Hypertension Brother      Diabetes Brother      C.A.D. Father          age 77 MI     Heart Disease Father         heart attack     Prostate Cancer Father      Cardiovascular Mother         CHF     Diabetes Mother      Hypertension Mother      Obesity Mother      Psychotic Disorder Mother         hospitalized after birth of third child for 6 weeks, was hitting her head on the wall and also hitting her head with a croquet mallet, [facility].     Psychotic Disorder Maternal Grandmother         attempted suicide         Current Outpatient Medications   Medication Sig Dispense Refill      alum & mag hydroxide-simethicone (MYLANTA/MAALOX) 200-200-20 MG/5ML SUSP suspension Take 30 mLs by mouth 2 times daily       atenolol (TENORMIN) 50 MG tablet Take 1 tablet (50 mg) by mouth daily 90 tablet 3     B Complex Vitamins (B COMPLEX PO) Take 1 tablet by mouth daily.       busPIRone HCl (BUSPAR) 30 MG tablet Take 1 tablet (30 mg) by mouth 2 times daily 180 tablet 3     calcium carbonate (TUMS) 500 MG chewable tablet Take 2 chew tab by mouth 3 times daily       Calcium Carbonate-Vitamin D (CALCIUM + D PO) Take 2 tablets by mouth daily.       diclofenac (VOLTAREN) 1 % topical gel Place 2 g onto the skin 4 times daily as needed for moderate pain 100 g 3     diphenhydrAMINE (BENADRYL) 25 MG tablet Take 25 mg by mouth every 6 hours as needed for itching or allergies       famotidine (PEPCID) 20 MG tablet Take 1 tablet (20 mg) by mouth daily 30 tablet 0     ferrous sulfate (FEROSUL) 325 (65 Fe) MG tablet Take 1 tablet (325 mg) by mouth daily (with breakfast) Take with orange juice to enhance absorption. 100 tablet 3     FOLIC ACID PO Take 400 mcg by mouth daily       hydrOXYzine (ATARAX) 25 MG tablet Take 1 tablet (25 mg) by mouth nightly as needed for itching 120 tablet 4     magnesium oxide (MAG-OX) 400 MG tablet Take 1 tablet (400 mg) by mouth daily 60 tablet 3     Melatonin 10 MG TABS tablet Take 10 mg by mouth nightly as needed for sleep       Omega-3 Fatty Acids (FISH OIL PO) Take 1 capsule by mouth daily        omeprazole (PRILOSEC) 40 MG DR capsule Take 1 capsule (40 mg) by mouth daily 90 capsule 3     vitamin B1 (THIAMINE) 100 MG tablet Take 1 tab 3 times daily by mouth for 1 week and then decrease to 1 tab daily. 90 tablet 3     VITAMIN D, CHOLECALCIFEROL, PO Take 400 Units by mouth daily        Mammogram Screening: Mammogram Screening: Recommended mammography every 1-2 years with patient discussion and risk factor consideration  History of abnormal Pap smear:   Last 3 Pap Results:   PAP (no units)   Date  "Value   07/21/2017 NIL   11/26/2014 NIL   12/22/2011 NIL     Any new diagnosis of family breast, ovarian, or bowel cancer? No    FHS-7: No flowsheet data found.    Mammogram Screening: Recommended mammography every 1-2 years with patient discussion and risk factor consideration  Pertinent mammograms are reviewed under the imaging tab.    Review of Systems  Constitutional, HEENT, cardiovascular, pulmonary, gi and gu systems are negative, except as otherwise noted.    OBJECTIVE:   /68   Pulse 83   Temp 97.6  F (36.4  C) (Tympanic)   Resp 16   Wt 58.5 kg (129 lb)   LMP 09/16/2006 (Exact Date)   SpO2 100%   BMI 19.61 kg/m   Estimated body mass index is 19.61 kg/m  as calculated from the following:    Height as of 9/7/21: 1.727 m (5' 8\").    Weight as of this encounter: 58.5 kg (129 lb).  Physical Exam  GENERAL: alert, no distress and appears older than stated age  EYES: Eyes grossly normal to inspection and conjunctivae and sclerae normal  HENT: normal cephalic/atraumatic, oropharynx clear and oral mucous membranes moist  NECK: no adenopathy and no asymmetry, masses, or scars  RESP: lungs clear to auscultation - no rales, rhonchi or wheezes  CV: regular rates and rhythm, normal S1 S2, no S3 or S4, no murmur, click or rub and no peripheral edema  ABDOMEN: soft, nontender, no hepatosplenomegaly, no masses and bowel sounds normal   (female): normal female external genitalia, normal urethral meatus , vaginal mucosal atrophy and normal cervix, adnexae, and uterus without masses.  MS: no gross musculoskeletal defects noted, no edema  NEURO: weakness of - generalized and mentation intact  PSYCH: mentation appears normal, affect normal/bright    Diagnostic Test Results:  Labs reviewed in Epic    ASSESSMENT / PLAN:   Mhiaela was seen today for medicare visit.    Diagnoses and all orders for this visit:    Encounter for Medicare annual wellness exam    Stage 3a chronic kidney disease (H)  -     BASIC METABOLIC PANEL; " Future  -     Hemoglobin; Future    Hyperlipidemia LDL goal <100  -     Lipid panel reflex to direct LDL Non-fasting; Future    Visit for screening mammogram  -     MA SCREENING DIGITAL BILAT - Future  (s+30); Future    Cervical cancer screening  -     Pap Screen with HPV - recommended age 30 - 65 years    Vitamin D deficiency  -     Vitamin D Deficiency; Future    Hypomagnesemia  -     Magnesium; Future    Essential hypertension with goal blood pressure less than 140/90  -     atenolol (TENORMIN) 50 MG tablet; Take 1 tablet (50 mg) by mouth daily    Anxiety  -     busPIRone HCl (BUSPAR) 30 MG tablet; Take 1 tablet (30 mg) by mouth 2 times daily    DDD (degenerative disc disease), lumbar  -     diclofenac (VOLTAREN) 1 % topical gel; Place 2 g onto the skin 4 times daily as needed for moderate pain    Gastrointestinal hemorrhage, unspecified gastrointestinal hemorrhage type  -     omeprazole (PRILOSEC) 40 MG DR capsule; Take 1 capsule (40 mg) by mouth daily    Asymptomatic postmenopausal status  -     DX Hip/Pelvis/Spine; Future    Other orders  -     REVIEW OF HEALTH MAINTENANCE PROTOCOL ORDERS          COUNSELING:  Reviewed preventive health counseling, as reflected in patient instructions        She reports that she has never smoked. She has never used smokeless tobacco.      Appropriate preventive services were discussed with this patient, including applicable screening as appropriate for cardiovascular disease, diabetes, osteopenia/osteoporosis, and glaucoma.  As appropriate for age/gender, discussed screening for colorectal cancer, prostate cancer, breast cancer, and cervical cancer. Checklist reviewing preventive services available has been given to the patient.    Reviewed patients plan of care and provided an AVS. The Intermediate Care Plan ( asthma action plan, low back pain action plan, and migraine action plan) for Mihaela meets the Care Plan requirement. This Care Plan has been established and reviewed  with the Patient.          RON Lora CNP  [facility]    Identified Health Risks:    I have reviewed Opioid Use Disorder and Substance Use Disorder risk factors and made any needed referrals.

## 2023-03-23 NOTE — LETTER
March 24, 2023      Mihaela Vance  [address] [id number]        Dear ,    We are writing to inform you of your test results.    Your vitamin D is low- recommend to take 2,000 units of vitamin D3 daily. Please let us know if you have any questions.       Resulted Orders   Lipid panel reflex to direct LDL Non-fasting   Result Value Ref Range    Cholesterol 226 (H) <200 mg/dL    Triglycerides 88 <150 mg/dL    Direct Measure HDL 86 >=50 mg/dL    LDL Cholesterol Calculated 122 (H) <=100 mg/dL    Non HDL Cholesterol 140 (H) <130 mg/dL    Narrative    Cholesterol  Desirable:  <200 mg/dL    Triglycerides  Normal:  Less than 150 mg/dL  Borderline High:  150-199 mg/dL  High:  200-499 mg/dL  Very High:  Greater than or equal to 500 mg/dL    Direct Measure HDL  Female:  Greater than or equal to 50 mg/dL   Male:  Greater than or equal to 40 mg/dL    LDL Cholesterol  Desirable:  <100mg/dL  Above Desirable:  100-129 mg/dL   Borderline High:  130-159 mg/dL   High:  160-189 mg/dL   Very High:  >= 190 mg/dL    Non HDL Cholesterol  Desirable:  130 mg/dL  Above Desirable:  130-159 mg/dL  Borderline High:  160-189 mg/dL  High:  190-219 mg/dL  Very High:  Greater than or equal to 220 mg/dL   BASIC METABOLIC PANEL   Result Value Ref Range    Sodium 137 136 - 145 mmol/L    Potassium 4.1 3.4 - 5.3 mmol/L    Chloride 100 98 - 107 mmol/L    Carbon Dioxide (CO2) 24 22 - 29 mmol/L    Anion Gap 13 7 - 15 mmol/L    Urea Nitrogen 28.9 (H) 8.0 - 23.0 mg/dL    Creatinine 1.30 (H) 0.51 - 0.95 mg/dL    Calcium 10.0 8.8 - 10.2 mg/dL    Glucose 89 70 - 99 mg/dL    GFR Estimate 46 (L) >60 mL/min/1.73m2      Comment:      eGFR calculated using 2021 CKD-EPI equation.   Hemoglobin   Result Value Ref Range    Hemoglobin 9.1 (L) 11.7 - 15.7 g/dL   Magnesium   Result Value Ref Range    Magnesium 1.6 (L) 1.7 - 2.3 mg/dL   Vitamin D Deficiency   Result Value Ref Range    Vitamin D, Total (25-Hydroxy) 11 (L) 20 - 75 ug/L     Narrative    Season, race, dietary intake, and treatment affect the concentration of 25-hydroxy-Vitamin D. Values may decrease during winter months and increase during summer months. Values 20-29 ug/L may indicate Vitamin D insufficiency and values <20 ug/L may indicate Vitamin D deficiency.    Vitamin D determination is routinely performed by an immunoassay specific for 25 hydroxyvitamin D3.  If an individual is on vitamin D2(ergocalciferol) supplementation, please specify 25 OH vitamin D2 and D3 level determination by LCMSMS test [id number].         If you have any questions or concerns, please call the clinic at the number listed above.       Sincerely,      RON Lora CNP

## 2023-03-23 NOTE — PROGRESS NOTES
The patient was provided with suggestions to help her develop a healthy physical lifestyle.  She is at risk for lack of exercise and has been provided with information to increase physical activity for the benefit of her well-being.  The patient was counseled and encouraged to consider modifying their diet and eating habits. She was provided with information on recommended healthy diet options.  The patient reports that she has difficulty with activities of daily living. She declines referral.  The patient was provided with written information regarding signs of hearing loss.  Information on urinary incontinence and treatment options given to patient.  She is at risk for falling and has been provided with information to reduce the risk of falling at home.

## 2023-03-23 NOTE — TELEPHONE ENCOUNTER
Please notify and help schedule a lab visit: Your kidney function is slightly down, make sure you are drinking enough water and avoid over the counter pain meds. Hemoglobin is down slightly. Recheck all these in 2-4 weeks. Thank you

## 2023-03-23 NOTE — RESULT ENCOUNTER NOTE
Please send patient letter notifying of labs and provider message.    Cholesterol levels are slightly high. Cholesterol lowering medications are recommended at a 10 year cardiac risk score of 7.5% or higher. Your cardiac risk score is:  The 10-year ASCVD risk score (Hayden CARRERO, et al., 2019) is: 3.6%    Values used to calculate the score:      Age: 63 years      Sex: Female      Is Non- : No      Diabetic: No      Tobacco smoker: No      Systolic Blood Pressure: 100 mmHg      Is BP treated: Yes      HDL Cholesterol: 86 mg/dL      Total Cholesterol: 226 mg/dL    Your kidney function is slightly down, make sure you are drinking enough water and avoid over the counter pain meds. Hemoglobin is down slightly. Recheck all these in 2-4 weeks.    Thanks,  RON Lopez CNP

## 2023-03-23 NOTE — PATIENT INSTRUCTIONS
[facility]- [phone]  2nd/4th Monday afternoon, every other Wednesday afternoon        Patient Education   Personalized Prevention Plan  You are due for the preventive services outlined below.  Your care team is available to assist you in scheduling these services.  If you have already completed any of these items, please share that information with your care team to update in your medical record.  Health Maintenance Due   Topic Date Due    URINE DRUG SCREEN  Never done    ANNUAL REVIEW OF HM ORDERS  Never done    Pneumococcal Vaccine (1 - PCV) Never done    Zoster (Shingles) Vaccine (1 of 2) Never done    Kidney Microalbumin Urine Test  04/22/2016    Cholesterol Lab  08/22/2019    Mammogram  09/05/2020    COVID-19 Vaccine (3 - Booster for Moderna series) 07/21/2021    HPV Screening  07/21/2022    PAP Smear  07/21/2022    Flu Vaccine (1) 09/01/2022    Basic Metabolic Panel  02/02/2023    Hemoglobin  02/02/2023     Your Health Risk Assessment indicates you feel you are not in good health    A healthy lifestyle helps keep the body fit and the mind alert. It helps protect you from disease, helps you fight disease, and helps prevent chronic disease (disease that doesn't go away) from getting worse. This is important as you get older and begin to notice twinges in muscles and joints and a decline in the strength and stamina you once took for granted. A healthy lifestyle includes good healthcare, good nutrition, weight control, recreation, and regular exercise. Avoid harmful substances and do what you can to keep safe. Another part of a healthy lifestyle is stay mentally active and socially involved.    Good healthcare   Have a wellness visit every year.   If you have new symptoms, let us know right away. Don't wait until the next checkup.   Take medicines exactly as prescribed and keep your medicines in a safe place. Tell us if your medicine causes problems.   Healthy diet and weight control   Eat 3 or 4 small,  nutritious, low-fat, high-fiber meals a day. Include a variety of fruits, vegetables, and whole-grain foods.   Make sure you get enough calcium in your diet. Calcium, vitamin D, and exercise help prevent osteoporosis (bone thinning).   If you live alone, try eating with others when you can. That way you get a good meal and have company while you eat it.   Try to keep a healthy weight. If you eat more calories than your body uses for energy, it will be stored as fat and you will gain weight.     Recreation   Recreation is not limited to sports and team events. It includes any activity that provides relaxation, interest, enjoyment, and exercise. Recreation provides an outlet for physical, mental, and social energy. It can give a sense of worth and achievement. It can help you stay healthy.    Mental Exercise and Social Involvement  Mental and emotional health is as important as physical health. Keep in touch with friends and family. Stay as active as possible. Continue to learn and challenge yourself.   Things you can do to stay mentally active are:  Learn something new, like a foreign language or musical instrument.   Play SCRABBLE or do crossword puzzles. If you cannot find people to play these games with you at home, you can play them with others on your computer through the Internet.   Join a games club--anything from card games to chess or checkers or lawn bowling.   Start a new hobby.   Go back to school.   Volunteer.   Read.   Keep up with world events.    Exercise for a Healthier Heart  You may wonder how you can improve the health of your heart. If you re thinking about exercise, you re on the right track. You don t need to become an athlete. But you do need a certain amount of brisk exercise to help strengthen your heart. If you have been diagnosed with a heart condition, your healthcare provider may advise exercise to help your condition. To help make exercise a habit, choose safe, fun activities.       Exercise with a friend. When activity is fun, you're more likely to stick with it.     Before you start  Check with your healthcare provider before starting an exercise program. This is especially important if you haven't been active for a while. It's also important if you have a long-term (chronic) health problem such as heart disease, diabetes, or obesity. Also check with your provider if you're at high risk for having these problems.   Why exercise?  Exercising regularly offers many healthy rewards. It can help you do all of these:   Improve your blood cholesterol level to help prevent further heart trouble.  Lower your blood pressure to help prevent a stroke or heart attack.  Control diabetes or reduce your risk of getting this disease.  Improve your heart and lung function.  Reach and stay at a healthy weight.  Make your muscles stronger so you can stay active.  Prevent falls and fractures by slowing the loss of bone mass (osteoporosis).  Manage stress better.  Improve your sense of self and your body image.  Exercise tips    Ease into your routine. Set small goals. Then build on them. Talk with your healthcare provider first before starting an exercise routine if you're not sure what your activity level should be.  Exercise on most days. Aim for a total of at least 150 minutes (2 hours and 30 minutes) or more of moderate-intensity aerobic activity each week. You could also do 75 minutes (1 hour and 15 minutes) or more of vigorous-intensity aerobic activity each week. Or try for a combination of both. Moderate activity means that you breathe heavier and your heart rate increases, but you can still talk. Think about doing at least 30 minutes of moderate exercise, 5 times a week. It's OK to work up to the 30-minute period over time. Examples of moderate-intensity activity are brisk walking, gardening, and water aerobics.  Step up your daily activity level.  Along with your exercise program, try being more  active the whole day. Walk instead of drive. Or park further away so that you take more steps each day. Do more household tasks or yard work. You may not be able to meet the advised amount of physical activity. But doing some moderate- or vigorous-intensity aerobic activity can help reduce your risk for heart disease. Your healthcare provider can help you figure out what is best for you.  Choose 1 or more activities you enjoy.  Walking is one of the easiest things you can do. You can also try swimming, riding a bike, dancing, or taking an exercise class.    Call 911  Call 911 right away if any of these occur:   Chest pain that doesn't go away quickly with rest  New burning, tightness, pressure, or heaviness in your chest, neck, shoulders, back, or arms  Abnormal or severe shortness of breath  A very fast or irregular heartbeat (palpitations)  Fainting  When to call your healthcare provider  Call your healthcare provider if you have any of these:   Dizziness or lightheadedness  Mild shortness of breath or chest pain  Increased or new joint or muscle pain    [employer] last reviewed this educational content on 7/1/2022 2000-2022 [employer]. All rights reserved. This information is not intended as a substitute for professional medical care. Always follow your healthcare professional's instructions.          Understanding USDA MyPlate  The USDA has guidelines to help you make healthy food choices. These are called MyPlate. MyPlate shows the food groups that make up healthy meals using the image of a place setting. Before you eat, think about the healthiest choices for what to put on your plate or in your cup or bowl. To learn more about building a healthy plate, visit www.choosemyplate.gov.     The food groups  Fruits. Any fruit or 100% fruit juice counts as part of the Fruit Group. Fruits may be fresh, canned, frozen, or dried, and may be whole, cut-up, or pureed. Make 1/2 of your plate fruits and  vegetables.  Vegetables. Any vegetable or 100% vegetable juice counts as a member of the Vegetable Group. Vegetables may be fresh, frozen, canned, or dried. They can be served raw or cooked and may be whole, cut-up, or mashed. Make 1/2 of your plate fruits and vegetables.  Grains. All foods made from grains are part of the Grains Group. These include wheat, rice, oats, cornmeal, and barley. Grains are often used to make foods such as bread, pasta, oatmeal, cereal, tortillas, and grits. Grains should be no more than 1/4 of your plate. At least half of your grains should be whole grains.  Protein. This group includes meat, poultry, seafood, beans and peas, eggs, processed soy products (such as tofu), nuts (including nut butters), and seeds. Make protein choices no more than 1/4 of your plate. Meat and poultry choices should be lean or low fat.  Dairy. The Dairy Group includes all fluid milk products and foods made from milk that contain calcium, such as yogurt and cheese. (Foods that have little calcium, such as cream, butter, and cream cheese, are not part of this group.) Most dairy choices should be low-fat or fat-free.  Oils. Oils aren't a food group, but they do contain essential nutrients. However it's important to watch your intake of oils. These are fats that are liquid at room temperature. They include canola, corn, olive, soybean, vegetable, and sunflower oil. Foods that are mainly oil include mayonnaise, certain salad dressings, and soft margarines. You likely already get your daily oil allowance from the foods you eat.  Things to limit  Eating healthy also means limiting these things in your diet:  Salt (sodium). Many processed foods have a lot of sodium. To keep sodium intake down, eat fresh vegetables, meats, poultry, and seafood when possible. Purchase low-sodium, reduced-sodium, or no-salt-added food products at the store. And don't add salt to your meals at home. Instead, season them with herbs and  spices such as dill, oregano, cumin, and paprika. Or try adding flavor with lemon or lime zest and juice.  Saturated fat. Saturated fats are most often found in animal products such as beef, pork, and chicken. They are often solid at room temperature, such as butter. To reduce your saturated fat intake, choose leaner cuts of meat and poultry. And try healthier cooking methods such as grilling, broiling, roasting, or baking. For a simple lower-fat swap, use plain nonfat yogurt instead of mayonnaise when making potato salad or macaroni salad.  Added sugars. These are sugars added to foods. They are in foods such as ice cream, candy, soda, fruit drinks, sports drinks, energy drinks, cookies, pastries, jams, and syrups. Cut down on added sugars by sharing sweet treats with a family member or friend. You can also choose fruit for dessert, and drink water or other unsweetened beverages.  [employer] last reviewed this educational content on 6/1/2020 2000-2022 [employer]. All rights reserved. This information is not intended as a substitute for professional medical care. Always follow your healthcare professional's instructions.        Activities of Daily Living    Your Health Risk Assessment indicates you have difficulties with activities of daily living such as housework, bathing, preparing meals, taking medication, etc. Please make a follow up appointment for us to address this issue in more detail.    Signs of Hearing Loss  Hearing loss is a problem shared by many people. In fact, it's one of the most common health problems, particularly as people age. Most people aged 65 and older have some hearing loss. By age 80, almost everyone does. Hearing loss often occurs slowly over the years. So, you may not realize your hearing has gotten worse.   When sudden hearing loss occurs, it's important to contact your healthcare provider right away. Your provider will do a medical exam and a hearing exam as soon as  possible. This is to help find the cause and type of your sudden hearing loss. Based on your diagnosis, your healthcare provider will discuss possible treatments.      Hearing much better with one ear can be a sign of hearing loss.     Have your hearing checked  Call your healthcare provider if you:   Have to strain to hear normal conversation  Have to watch other people s faces very carefully to follow what they re saying  Need to ask people to repeat what they ve said  Often misunderstand what people are saying  Turn the volume of the television or radio up so high that others complain  Feel that people are mumbling when they re talking to you  Find that the effort to hear leaves you feeling tired and irritated  Notice, when using the phone, that you hear better with one ear than the other  [employer] last reviewed this educational content on 6/1/2022 2000-2022 [employer]. All rights reserved. This information is not intended as a substitute for professional medical care. Always follow your healthcare professional's instructions.          Urinary Incontinence, Female (Adult)   Urinary incontinence means loss of bladder control. This problem affects many women, especially as they get older. If you have incontinence, you may be embarrassed to ask for help. But know that this problem can be treated.   Types of Incontinence  There are different types of incontinence. Two of the main types are described here. You can have more than one type.   Stress incontinence. With this type, urine leaks when pressure (stress) is put on the bladder. This may happen when you cough, sneeze, or laugh. Stress incontinence most often occurs because the pelvic floor muscles that support the bladder and urethra are weak. This can happen after pregnancy and vaginal childbirth or a hysterectomy. It can also be due to excess body weight or hormone changes.  Urge incontinence (also called overactive bladder). With this type, a  sudden urge to urinate is felt often. This may happen even though there may not be much urine in the bladder. The need to urinate often during the night is common. Urge incontinence most often occurs because of bladder spasms. This may be due to bladder irritation or infection. Damage to bladder nerves or pelvic muscles, constipation, and certain medicines can also lead to urge incontinence.  Treatment depends on the cause. Further evaluation is needed to find the type you have. This will likely include an exam and certain tests. Based on the results, you and your healthcare provider can then plan treatment. Until a diagnosis is made, the home care tips below can help ease symptoms.   Home care  Do pelvic floor muscle exercises, if they are prescribed. The pelvic floor muscles help support the bladder and urethra. Many women find that their symptoms improve when doing special exercises that strengthen these muscles. To do the exercises, contract the muscles you would use to stop your stream of urine. But do this when you re not urinating. Hold for 10 seconds, then relax. Repeat 10 to 20 times in a row, at least 3 times a day. Your healthcare provider may give you other instructions for how to do the exercises and how often.  Keep a bladder diary. This helps track how often and how much you urinate over a set period of time. Bring this diary with you to your next visit with the provider. The information can help your provider learn more about your bladder problem.  Lose weight, if advised to by your provider. Extra weight puts pressure on the bladder. Your provider can help you create a weight-loss plan that s right for you. This may include exercising more and making certain diet changes.  Don't have foods and drinks that may irritate the bladder. These can include alcohol and caffeinated drinks.  Quit smoking. Smoking and other tobacco use can lead to a long-term (chronic) cough that strains the pelvic floor  muscles. Smoking may also damage the bladder and urethra. Talk with your provider about treatments or methods you can use to quit smoking.  If drinking large amounts of fluid makes you have symptoms, you may be advised to limit your fluid intake. You may also be advised to drink most of your fluids during the day and to limit fluids at night.  If you re worried about urine leakage or accidents, you may wear absorbent pads to catch urine. Change the pads often. This helps reduce discomfort. It may also reduce the risk of skin or bladder infections.    Follow-up care  Follow up with your healthcare provider, or as directed. It may take some to find the right treatment for your problem. But healthy lifestyle changes can be made right away. These include such things as exercising on a regular basis, eating a healthy diet, losing weight (if needed), and quitting smoking. Your treatment plan may include special therapies or medicines. Certain procedures or surgery may also be options. Talk about any questions you have with your provider.   When to seek medical advice  Call the healthcare provider right away if any of these occur:  Fever of 100.4 F (38 C) or higher, or as directed by your provider  Bladder pain or fullness  Belly swelling  Nausea or vomiting  Back pain  Weakness, dizziness, or fainting  "[employer] last reviewed this educational content on 1/1/2020 2000-2022 [employer]. All rights reserved. This information is not intended as a substitute for professional medical care. Always follow your healthcare professional's instructions.          Preventing Falls at Home  A person can fall for many reasons. Older adults may fall because reaction time slows down as we age. Your muscles and joints may get stiff, weak, or less flexible because of illness, medicines, or a physical condition.   Other health problems that make falls more likely include:   Arthritis  Dizziness or lightheadedness when you  stand up (orthostatic hypotension)  History of a stroke  Dizziness  Anemia  Certain medicines taken for mental illness or to control blood pressure.  Problems with balance or gait  Bladder or urinary problems  History of falling  Changes in vision (vision impairment)  Changes in thinking skills and memory (cognitive impairment)  Injuries from a fall can include serious injuries such as broken bones, dislocated joints, internal bleeding and cuts. Injuries like these can limit your independence.   Prevention tips  To help prevent falls and fall-related injuries, follow the tips below.    Floors  To make floors safer:   Put nonskid pads under area rugs.  Remove small rugs.  Replace worn floor coverings.  Tack carpets firmly to each step on carpeted stairs. Put nonskid strips on the edges of uncarpeted stairs.  Keep floors and stairs free of clutter and cords.  Arrange furniture so there are clear pathways.  Clean up any spills right away.  Bathrooms    To make bathrooms safer:   Install grab bars in the tub or shower.  Apply nonskid strips or put a nonskid rubber mat in the tub or shower.  Sit on a bath chair to bathe.  Use bathmats with nonskid backing.  Lighting  To improve visibility in your home:    Keep a flashlight in each room. Or put a lamp next to the bed within easy reach.  Put nightlights in the bedrooms, hallways, kitchen, and bathrooms.  Make sure all stairways have good lighting.  Take your time when going up and down stairs.  Put handrails on both sides of stairs and in walkways for more support. To prevent injury to your wrist or arm, don t use handrails to pull yourself up.  Install grab bars to pull yourself up.  Move or rearrange items that you use often. This will make them easier to find or reach.  Look at your home to find any safety hazards. Especially look at doorways, walkways, and the driveway. Remove or repair any safety problems that you find.  Other changes to make  Look around to find any  safety hazards. Look closely at doorways, walkways, and the driveway. Remove or repair any safety problems that you find.  Wear shoes that fit well.  Take your time when going up and down stairs.  Put handrails on both sides of stairs and in walkways for more support. To prevent injury to your wrist or arm, don t use handrails to pull yourself up.  Install grab bars wherever needed to pull yourself up.  Arrange items that you use often. This will make them easier to find or reach.    [employer] last reviewed this educational content on 3/1/2020    9255-9835 [employer]. All rights reserved. This information is not intended as a substitute for professional medical care. Always follow your healthcare professional's instructions.

## 2023-03-23 NOTE — LETTER
March 24, 2023      Mihaela Vance  [id number] [address] [facility] [id number]        Dear ,    We are writing to inform you of your test results.    Cholesterol levels are slightly high. Cholesterol lowering medications are recommended at a 10 year cardiac risk score of 7.5% or higher. Your cardiac risk score is:   The 10-year ASCVD risk score (Hayden CARRERO, et al., 2019) is: 3.6%     Values used to calculate the score:       Age: 63 years       Sex: Female       Is Non- : No       Diabetic: No       Tobacco smoker: No       Systolic Blood Pressure: 100 mmHg       Is BP treated: Yes       HDL Cholesterol: 86 mg/dL       Total Cholesterol: 226 mg/dL     Your kidney function is slightly down, make sure you are drinking enough water and avoid over the counter pain meds. Hemoglobin is down slightly. Recheck all these in 2-4 weeks.        Resulted Orders   Lipid panel reflex to direct LDL Non-fasting   Result Value Ref Range    Cholesterol 226 (H) <200 mg/dL    Triglycerides 88 <150 mg/dL    Direct Measure HDL 86 >=50 mg/dL    LDL Cholesterol Calculated 122 (H) <=100 mg/dL    Non HDL Cholesterol 140 (H) <130 mg/dL    Narrative    Cholesterol  Desirable:  <200 mg/dL    Triglycerides  Normal:  Less than 150 mg/dL  Borderline High:  150-199 mg/dL  High:  200-499 mg/dL  Very High:  Greater than or equal to 500 mg/dL    Direct Measure HDL  Female:  Greater than or equal to 50 mg/dL   Male:  Greater than or equal to 40 mg/dL    LDL Cholesterol  Desirable:  <100mg/dL  Above Desirable:  100-129 mg/dL   Borderline High:  130-159 mg/dL   High:  160-189 mg/dL   Very High:  >= 190 mg/dL    Non HDL Cholesterol  Desirable:  130 mg/dL  Above Desirable:  130-159 mg/dL  Borderline High:  160-189 mg/dL  High:  190-219 mg/dL  Very High:  Greater than or equal to 220 mg/dL   BASIC METABOLIC PANEL   Result Value Ref Range    Sodium 137 136 - 145 mmol/L    Potassium 4.1 3.4 - 5.3 mmol/L    Chloride 100 98 - 107  mmol/L    Carbon Dioxide (CO2) 24 22 - 29 mmol/L    Anion Gap 13 7 - 15 mmol/L    Urea Nitrogen 28.9 (H) 8.0 - 23.0 mg/dL    Creatinine 1.30 (H) 0.51 - 0.95 mg/dL    Calcium 10.0 8.8 - 10.2 mg/dL    Glucose 89 70 - 99 mg/dL    GFR Estimate 46 (L) >60 mL/min/1.73m2      Comment:      eGFR calculated using 2021 CKD-EPI equation.   Hemoglobin   Result Value Ref Range    Hemoglobin 9.1 (L) 11.7 - 15.7 g/dL   Magnesium   Result Value Ref Range    Magnesium 1.6 (L) 1.7 - 2.3 mg/dL       If you have any questions or concerns, please call the clinic at the number listed above.       Sincerely,      RON Lora CNP

## 2023-03-24 RX ORDER — FAMOTIDINE 20 MG
2000 TABLET ORAL DAILY
COMMUNITY
Start: 2023-03-24

## 2023-03-24 NOTE — TELEPHONE ENCOUNTER
Pt was called, she said she  takes caffeine with aspirin pills for the energy boost, she was advised to not take them as the caffeine interferes with sleep, she mentioned that she has sleep problem. She doesn't take ibuprofen. She says she does drink a lot of water. She will call in 2-3 weeks to schedule lab appt. Yue Lezama RN

## 2023-03-24 NOTE — RESULT ENCOUNTER NOTE
Please send letter:    Sage Chairez    Your vitamin D is low- recommend to take 2,000 units of vitamin D3 daily. Please let us know if you have any questions.     Take care,    RON Lopez CNP

## 2023-03-27 LAB
BKR LAB AP GYN ADEQUACY: NORMAL
BKR LAB AP GYN INTERPRETATION: NORMAL
BKR LAB AP HPV REFLEX: NORMAL
BKR LAB AP PREVIOUS ABNORMAL: NORMAL
PATH REPORT.COMMENTS IMP SPEC: NORMAL
PATH REPORT.COMMENTS IMP SPEC: NORMAL
PATH REPORT.RELEVANT HX SPEC: NORMAL

## 2023-03-29 LAB
HUMAN PAPILLOMA VIRUS 16 DNA: NEGATIVE
HUMAN PAPILLOMA VIRUS 18 DNA: NEGATIVE
HUMAN PAPILLOMA VIRUS FINAL DIAGNOSIS: NORMAL
HUMAN PAPILLOMA VIRUS OTHER HR: NEGATIVE

## 2023-04-26 NOTE — TELEPHONE ENCOUNTER
"Requested Prescriptions   Pending Prescriptions Disp Refills     hydrOXYzine (ATARAX) 25 MG tablet [Pharmacy Med Name: hydroxyzine HCl 25 mg tablet] 30 tablet 11     Sig: Take 1 tablet (25 mg) by mouth nightly as needed       Antihistamines Protocol Passed - 12/15/2020  9:39 AM        Passed - Recent (12 mo) or future (30 days) visit within the authorizing provider's specialty     Patient has had an office visit with the authorizing provider or a provider within the authorizing providers department within the previous 12 mos or has a future within next 30 days. See \"Patient Info\" tab in inbasket, or \"Choose Columns\" in Meds & Orders section of the refill encounter.              Passed - Patient is age 3 or older     Apply age and/or weight-based dosing for peds patients age 3 and older.    Forward request to provider for patients under the age of 3.          Passed - Medication is active on med list             " What Type Of Note Output Would You Prefer (Optional)?: Bullet Format How Severe Is Your Skin Lesion?: mild Has Your Skin Lesion Been Treated?: not been treated Is This A New Presentation, Or A Follow-Up?: Skin Lesions

## 2023-05-09 NOTE — TELEPHONE ENCOUNTER
"Patient c/o feeling dizzy and light headed at grocery store. Patient eventual went home, took her blood pressure medication and felt fine. Patient was very talkative and jovial  And talking about her anxieties and other situations      Additional Information    Negative: Systolic BP < 90 and feeling dizzy, lightheaded, or weak    Negative: Started suddenly after an allergic medicine, an allergic food, or bee sting    Negative: Shock suspected (e.g., cold/pale/clammy skin, too weak to stand, low BP, rapid pulse)    Negative: Difficult to awaken or acting confused (e.g., disoriented, slurred speech)    Negative: Fainted    Negative: Chest pain    Negative: Bleeding (e.g., vomiting blood, rectal bleeding or tarry stools, severe vaginal bleeding)    Negative: Extra heart beats or heart is beating fast (i.e., 'palpitations')    Negative: Sounds like a life-threatening emergency to the triager    Answer Assessment - Initial Assessment Questions  1. BLOOD PRESSURE: \"What is the blood pressure?\" \"Did you take at least two measurements 5 minutes apart?\"      Does not have bp equipment  2. ONSET: \"When did you take your blood pressure?\"      no  3. HOW: \"How did you obtain the blood pressure?\" (e.g., visiting nurse, automatic home BP monitor)      No bp  4. HISTORY: \"Do you have a history of low blood pressure?\" \"What is your blood pressure normally?\"      unk  5. MEDICATIONS: \"Are you taking any medications for blood pressure?\" If Yes, ask: \"Have they been changed recently?\"      yes  6. PULSE RATE: \"Do you know what your pulse rate is?\"       no  7. OTHER SYMPTOMS: \"Have you been sick recently?\" \"Have you had a recent injury?\"      no  8. PREGNANCY: \"Is there any chance you are pregnant?\" \"When was your last menstrual period?\"      no    Protocols used: BLOOD PRESSURE - LOW-A-OH      "

## 2023-06-15 NOTE — NURSING NOTE
Mihaela presents to clinic for a blood pressure check. Today's first reading is 137/95 with pulse of 65 and second reading is 138/95 with pulse of 68.     She states that she was taken off of her Norvasc and then 6/10 her blood pressure shot way up and got jittery and had a panic attack. Then on 6/13 she was at the grocery store and was unloading her groceries and got lightheaded so she had to sit down. She states she has not felt normal since she went off the Norvasc so she started the Norvasc back up that same day (6/13). She is now out and would like a refill sent in today as she is afraid to be without it because she is afraid of getting jittery and having another panic attack. I will forward this to Kylah Sánchez for her review.     Jenny Cheema RN

## 2023-08-24 PROBLEM — S06.5XAA SUBDURAL HEMATOMA (H): Status: ACTIVE | Noted: 2023-01-01

## 2023-08-24 PROBLEM — N18.31 STAGE 3A CHRONIC KIDNEY DISEASE (H): Status: ACTIVE | Noted: 2023-01-01

## 2023-08-24 PROBLEM — K92.2 UPPER GI BLEED: Status: ACTIVE | Noted: 2023-01-01
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
## 2023-08-24 NOTE — PROGRESS NOTES
Interval Vascular Neurology Note    CT shows subacute on chronic left SDH. Stroke code de-escalated by previous provider. Would recommend MRI brain w/ and w/out contrast, high dose thiamine, EEG, and neurosurgery consultation; may need transfer. Stroke service will sign off.     Patricia Gayle NP

## 2023-08-24 NOTE — ED PROVIDER NOTES
Emergency Department Patient Sign-out       Brief HPI:  This is a 63 year old female signed out to me by Dr. Rojas.  See initial ED Provider note for details of the presentation.     Dr. Rojas    HPI  Mihaela Vance is a 63 year old female who has medical history significant for chronic kidney disease, chronic pain, anxiety, history of alcoholism, with prior upper GI bleed, presenting the emergency department with concerns regarding vomiting, with decreased appetite.  Patient is difficult historian.  She states her last drink of alcohol was 2 days ago, and then states that she began vomiting 2 days ago.  Patient states she has not had anything to eat over the past few days.  She does report use of milk of magnesia which has caused stooling, and patient arrives covered in stool, darkened in color.  Denies any blood in the vomit.  Later during ED course, patient thinks that she may have fallen in the past couple of days, however does not exactly recall the fall.  Not on blood thinning medications.  Reports taking her daily prescription medications.  Does not take frequent ibuprofen       Patient evaluated at 645, no current complaints  Blood pressures within normal limits, persistent tachycardia throughout stay thus far  Alert oriented answering questions appropriately no focal neurologic deficit    Reviewed with stroke neurology, no further input with respect to small subdural hematoma.  See their note.  Awaiting repeat lab work at 800, continue infusion IV fluids, anticipate admission, may require transfusion.  No active bleeding no vomiting.    Continues with mild tachycardia, blood pressures normotensive no diarrhea/GI bleeding.  Reviewed with neurosurgery who recommends repeat brain scan 8-10 hours from initial scan.  Scheduled for brain MRI at request of neurology, 1100.  BMP trending toward normal after IV fluid resuscitation although BUN/creatinine remain elevated.  Mild hypokalemia written for  replacement protocol.  Repeat hemoglobin 6.71 unit packed red cells ordered.  Case reviewed with  who accepts for hospital service.    Exam:   Patient Vitals for the past 24 hrs:   BP Temp Temp src Pulse Resp SpO2   08/24/23 0630 104/79 -- -- 105 21 97 %   08/24/23 0600 125/88 -- -- 102 11 99 %   08/24/23 0546 -- -- -- -- -- 98 %   08/24/23 0545 (!) 143/97 -- -- 101 -- --   08/24/23 0531 -- -- -- -- -- 94 %   08/24/23 0530 (!) 129/96 -- -- 107 -- --   08/24/23 0515 (!) 114/92 -- -- 107 10 90 %   08/24/23 0514 -- -- -- 108 10 93 %   08/24/23 0504 -- -- -- -- -- 97 %   08/24/23 0503 -- -- -- -- -- 94 %   08/24/23 0500 (!) 130/93 -- -- 101 -- --   08/24/23 0430 115/74 -- -- (!) 121 20 --   08/24/23 0415 93/45 -- -- 110 24 91 %   08/24/23 0409 98/73 -- -- 114 15 97 %   08/24/23 0358 -- -- -- -- -- 100 %   08/24/23 0357 90/77 -- -- 105 -- --   08/24/23 0349 (!) 76/53 -- -- 114 17 --   08/24/23 0347 (!) 78/45 97.5  F (36.4  C) Oral 112 16 94 %           ED RESULTS:   Results for orders placed or performed during the hospital encounter of 08/24/23 (from the past 24 hour(s))   [place] Draw *Canceled*     Status: None ()    Collection Time: 08/24/23  3:42 AM    Narrative    The following orders were created for panel order [place] Draw.  Procedure                               Abnormality         Status                     ---------                               -----------         ------                       Please view results for these tests on the individual orders.   [place] Draw *Canceled*     Status: None ()    Collection Time: 08/24/23  3:42 AM    Narrative    The following orders were created for panel order [place] Draw.  Procedure                               Abnormality         Status                     ---------                               -----------         ------                     Extra Blue Top Tube[[id number]]                                                           Please view results for  these tests on the individual orders.   INR     Status: Normal    Collection Time: 08/24/23  3:55 AM   Result Value Ref Range    INR 1.13 0.85 - 1.15   Occult blood stool     Status: Abnormal    Collection Time: 08/24/23  3:55 AM   Result Value Ref Range    Occult Blood Positive (A) Negative   Comprehensive metabolic panel     Status: Abnormal    Collection Time: 08/24/23  4:07 AM   Result Value Ref Range    Sodium 136 136 - 145 mmol/L    Potassium 3.3 (L) 3.4 - 5.3 mmol/L    Chloride 81 (L) 98 - 107 mmol/L    Carbon Dioxide (CO2) 26 22 - 29 mmol/L    Anion Gap 29 (H) 7 - 15 mmol/L    Urea Nitrogen 80.8 (H) 8.0 - 23.0 mg/dL    Creatinine 2.79 (H) 0.51 - 0.95 mg/dL    Calcium 11.3 (H) 8.8 - 10.2 mg/dL    Glucose 210 (H) 70 - 99 mg/dL    Alkaline Phosphatase 84 35 - 104 U/L    AST 35 0 - 45 U/L    ALT 20 0 - 50 U/L    Protein Total 7.9 6.4 - 8.3 g/dL    Albumin 4.3 3.5 - 5.2 g/dL    Bilirubin Total 0.5 <=1.2 mg/dL    GFR Estimate 18 (L) >60 mL/min/1.73m2   CBC with platelets, differential     Status: Abnormal    Collection Time: 08/24/23  4:07 AM    Narrative    The following orders were created for panel order CBC with platelets, differential.  Procedure                               Abnormality         Status                     ---------                               -----------         ------                     CBC with platelets and d...[[id number]]  Abnormal            Final result                 Please view results for these tests on the individual orders.   ABO/Rh type and screen     Status: None    Collection Time: 08/24/23  4:07 AM    Narrative    The following orders were created for panel order ABO/Rh type and screen.  Procedure                               Abnormality         Status                     ---------                               -----------         ------                     Adult Type and Screen[[id number]]                            Final result                 Please view results for  these tests on the individual orders.   Ammonia (on ice)     Status: Normal    Collection Time: 08/24/23  4:07 AM   Result Value Ref Range    Ammonia 15 11 - 51 umol/L   Alcohol level blood     Status: Normal    Collection Time: 08/24/23  4:07 AM   Result Value Ref Range    Alcohol ethyl <0.01 <=0.01 g/dL   CBC with platelets and differential     Status: Abnormal    Collection Time: 08/24/23  4:07 AM   Result Value Ref Range    WBC Count 21.9 (H) 4.0 - 11.0 10e3/uL    RBC Count 2.90 (L) 3.80 - 5.20 10e6/uL    Hemoglobin 8.2 (L) 11.7 - 15.7 g/dL    Hematocrit 25.8 (L) 35.0 - 47.0 %    MCV 89 78 - 100 fL    MCH 28.3 26.5 - 33.0 pg    MCHC 31.8 31.5 - 36.5 g/dL    RDW 19.5 (H) 10.0 - 15.0 %    Platelet Count 523 (H) 150 - 450 10e3/uL    % Neutrophils 89 %    % Lymphocytes 7 %    % Monocytes 3 %    % Eosinophils 0 %    % Basophils 0 %    % Immature Granulocytes 1 %    NRBCs per 100 WBC 0 <1 /100    Absolute Neutrophils 19.5 (H) 1.6 - 8.3 10e3/uL    Absolute Lymphocytes 1.5 0.8 - 5.3 10e3/uL    Absolute Monocytes 0.7 0.0 - 1.3 10e3/uL    Absolute Eosinophils 0.0 0.0 - 0.7 10e3/uL    Absolute Basophils 0.0 0.0 - 0.2 10e3/uL    Absolute Immature Granulocytes 0.2 <=0.4 10e3/uL    Absolute NRBCs 0.1 10e3/uL   Adult Type and Screen     Status: None    Collection Time: 08/24/23  4:07 AM   Result Value Ref Range    ABO/RH(D) A POS     Antibody Screen Negative Negative    SPECIMEN EXPIRATION DATE [id number]    Lipase     Status: Normal    Collection Time: 08/24/23  4:07 AM   Result Value Ref Range    Lipase 20 13 - 60 U/L   Magnesium     Status: Abnormal    Collection Time: 08/24/23  4:07 AM   Result Value Ref Range    Magnesium 2.7 (H) 1.7 - 2.3 mg/dL   Phosphorus     Status: Abnormal    Collection Time: 08/24/23  4:07 AM   Result Value Ref Range    Phosphorus 6.5 (H) 2.5 - 4.5 mg/dL   CT Head w/o Contrast     Status: Abnormal    Collection Time: 08/24/23  5:04 AM   Result Value Ref Range    Radiologist flags Left convexity  subdural collection (AA)     Narrative    EXAM: CT HEAD W/O CONTRAST  LOCATION: [facility]  DATE: 8/24/2023    INDICATION: Confusion.  Altered.  COMPARISON: 01/27/2022.  TECHNIQUE: Routine CT Head without IV contrast. Multiplanar reformats. Dose reduction techniques were used.    FINDINGS:  INTRACRANIAL CONTENTS: Interval development of a predominantly CSF attenuation left convexity subdural collection with mild increased attenuation in its lateral margin. This measures 7 mm in maximum thickness. No hyperdense hemorrhage elsewhere. Volume   loss and presumed chronic small vessel ischemia are stable. No midline shift. No hydrocephalus. No CT evidence of acute infarct.    VISUALIZED ORBITS/SINUSES/MASTOIDS: No intraorbital abnormality. No paranasal sinus mucosal disease. No middle ear or mastoid effusion.    BONES/SOFT TISSUES: No acute abnormality.      Impression    IMPRESSION:  1.  Interval development of a 7 mm thick likely subacute on chronic left convexity subdural hematoma. No hemorrhage elsewhere.    2.  No midline shift.    3.  Stable age-related changes.      [Critical Result: Left convexity subdural collection    Finding was identified on 8/24/2023 5:09 AM CDT.     Dr. Rojas was contacted by me on 8/24/2023 5:19 AM CDT and verbalized understanding of the critical result.                    CT Abdomen Pelvis w/o Contrast     Status: None    Collection Time: 08/24/23  5:05 AM    Narrative    EXAM: CT ABDOMEN PELVIS W/O CONTRAST  LOCATION: [facility]  DATE: 8/24/2023    INDICATION: Abd pain.  Upper GI bleed.  Alcohol abuse.  Elevated WBC.  COMPARISON: None available.  TECHNIQUE: CT scan of the abdomen and pelvis was performed without intravenous contrast Multiplanar reformats were obtained. Dose reduction techniques were used.    FINDINGS:   LOWER CHEST: Moderate-sized hiatal hernia.    ABDOMEN/PELVIS: Limited evaluation of the abdominal organs due to  lack of intravenous contrast.    HEPATOBILIARY: Diffuse hypodense appearance of the liver, likely due to underlying hepatic steatosis.    The gallbladder is unremarkable.    PANCREAS: The unenhanced pancreas is grossly unremarkable.    SPLEEN: No splenomegaly.    ADRENAL GLANDS: No adrenal nodules.    KIDNEYS/BLADDER: No radiodense kidney/ureteral stones or hydronephrosis in either kidney.    BOWEL: No abnormally dilated bowel loops. Colonic diverticulosis without CT evidence of acute diverticulitis.    PERITONEUM: No evidence of free fluid in the abdomen and pelvis. No free peritoneal or portal venous gas.    PELVIC ORGANS: Unremarkable.    VASCULATURE: Moderate atherosclerotic vascular calcification of the abdominal aorta and iliac vessels.    LYMPH NODES: Unremarkable.    MUSCULOSKELETAL: Diffuse osteopenia with multilevel degenerative changes. Multiple compression deformities of the visualized thoracic and lumbar spine most prominent at T10 with approximately 90% vertebral height loss, T11 with approximately 50%   vertebral height loss, T12 and L3 with approximately 40% vertebral height loss. Small fat-containing umbilical hernia.      Impression    IMPRESSION: Within the limitation of noncontrast exam, there is;  1. No evidence of acute pathology in the abdomen and pelvis.  2. Hepatic steatosis.  3. Colonic diverticulosis without CT evidence of acute diverticulitis.  4. Multiple likely old compression deformities of the visualized lower thoracic and lumbar spine.                   ED MEDICATIONS:   Medications   sodium chloride 0.9 % 1,000 mL with Infuvite Adult 10 mL, thiamine 100 mg, folic acid 1 mg infusion ( Intravenous Rate/Dose Verify 8/24/23 0638)   pantoprazole (PROTONIX) IV push injection 40 mg (40 mg Intravenous $Given 8/24/23 0405)   ondansetron (ZOFRAN) injection 4 mg (4 mg Intravenous $Given 8/24/23 0603)   lactated ringers BOLUS 1,000 mL (0 mLs Intravenous Stopped 8/24/23 0637)   ondansetron  (ZOFRAN) injection 4 mg (4 mg Intravenous $Given 8/24/23 2040)         Impression:    ICD-10-CM    1. Upper GI bleed  K92.2       2. Subdural hematoma (H)  S06.5XAA       3. Alcoholism (H)  F10.20       4. Stage 3a chronic kidney disease (H)  N18.31           Plan:    Awaiting stroke neurology input regarding subdural hematoma  Post 2 L crystalloid and Protonix  Repeat CBC CMP  Anticipate admission here        MD Hermilo Balderrama Scott L, MD  08/24/23 1010
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
## 2023-08-24 NOTE — LETTER
Mihaela Vance  [address] [facility] [address]  September 5, 2023    Dear Mihaela,   This letter is to inform you of the results of your pathology report on your upper endoscopy (EGD).    Your pathology report was:  Final Diagnosis   A(1).  Stomach, cardia, biopsy:  -Oxyntic type gastric mucosa with inactive chronic gastritis.  -Negative for H. Pylori organisms on immunohistochemical stains.  - Negative for intestinal metaplasia.   -Negative for dysplasia or malignancy  B(2).  Esophagus, distal, biopsy:  -Completely ulcerated mucosa with prominent necroinflammatory infiltrate.  --Special stains for fungal organisms (PAS) is negative  -No malignancy identified  C(3).  Esophagus, 15 cm, biopsy:  -Ulcerating acute esophagitis with prominent reactive epithelial changes.  -Immunohistochemical stains for HSV and CMV are negative  -Special stains for fungal organisms (PAS) is negative  -Negative for intestinal metaplasia.  -Negative for eosinophilic esophagitis.  -Negative for dysplasia or malignancy.     Showed findings consistent with severe inflammation of the esophagus.  Please continue taking the medication that was prescribed to you.  I recommend that you be seen by a gastroenterologist for further follow-up and treatment.    If you have any questions or concerns please do not hesitate to call my office at [phone].  Sincerely,     [facility] DO Gilliam FACOS [facility]

## 2023-08-24 NOTE — MEDICATION SCRIBE - ADMISSION MEDICATION HISTORY
Medication Scribe Admission Medication History    Admission medication history is complete. The information provided in this note is only as accurate as the sources available at the time of the update.    Medication reconciliation/reorder completed by provider prior to medication history? No    Information Source(s): Patient via in-person    Pertinent Information: Patient could recall all medications but not when she took them last. She knows it was not today or yesterday due to the vomiting and nausea she is experiencing. Resumed Amlodipine in June but also states she is taking Atenolol as well. All OTC meds were confirmed but patient stated she is not good about taking them on a regular basis.    Changes made to PTA medication list:  Added: None  Deleted: Diclofenac Gel, Famotidine  Changed: None    Medication Affordability:  Not including over the counter (OTC) medications, was there a time in the past 3 months when you did not take your medications as prescribed because of cost?: No    Allergies reviewed with patient and updates made in EHR: yes    Medication History Completed By: Keturah Davis 8/24/2023 10:00 AM    Prior to Admission medications    Medication Sig Last Dose Taking? Auth Provider Long Term End Date   alum & mag hydroxide-simethicone (MYLANTA/MAALOX) 200-200-20 MG/5ML SUSP suspension Take 30 mLs by mouth 2 times daily 8/23/2023 at prn Yes Reported, Patient     amLODIPine (NORVASC) 2.5 MG tablet Take 1 tablet (2.5 mg) by mouth daily Past Week at am Yes Kylah Sánchez APRN CNP Yes    atenolol (TENORMIN) 50 MG tablet Take 1 tablet (50 mg) by mouth daily Past Week at am Yes Kylah Sánchez APRN CNP Yes    B Complex Vitamins (B COMPLEX PO) Take 1 tablet by mouth daily. Past Month at unknown Yes Reported, Patient     busPIRone HCl (BUSPAR) 30 MG tablet Take 1 tablet (30 mg) by mouth 2 times daily Past Week at pm Yes Kylah Sánchez APRN CNP Yes    calcium carbonate (TUMS) 500 MG  Stroke Education Note      Stroke Folder provided Understanding Stroke Booklet, BE FAST magnet, support group information, Quit Line pamphlet, Quitting Tobacco FYWB, Low Cholesterol, Low Saturated Fat, Low Sodium Diet and individual risk assessment and Individual Risk Assessment form and contents discussed.     Stroke causes and different types of stroke discussed.  Signs and symptoms of stroke and when to call 911 were discussed. PMD f/u recommended after discharge.    Patient's uncontrollable risk factors arePrior Stroke/TIA/AMI (02/25/18 2332) . Patient's controllable risk factors  High blood pressure;Smoking;Overweight/obesity (02/25/18 2332).      The following lifestyle modifications were recommended: monitor blood pressure, quit smoking, reduce weight, start an exercise program with physician approval, follow low chol/fat/Na diet.     Patient allowed to verbalize fears and concerns. Questions were encouraged and answered.    Stroke Nurse Navigator to follow.          chewable tablet Take 2 chew tab by mouth 3 times daily Unknown at prn Yes Reported, Patient     Calcium Carbonate-Vitamin D (CALCIUM + D PO) Take 2 tablets by mouth daily. Past Month at unknown Yes Reported, Patient     diphenhydrAMINE (BENADRYL) 25 MG tablet Take 25 mg by mouth every 6 hours as needed for itching or allergies Unknown at prn Yes Unknown, Entered By History     ferrous sulfate (FEROSUL) 325 (65 Fe) MG tablet Take 1 tablet (325 mg) by mouth daily (with breakfast) Take with orange juice to enhance absorption. Past Month at unknown Yes Kylah Sánchez APRN CNP     FOLIC ACID PO Take 400 mcg by mouth daily Past Month at unknown Yes Reported, Patient     hydrOXYzine (ATARAX) 25 MG tablet Take 1 tablet (25 mg) by mouth nightly as needed for itching Past Week at pm Yes Kylah Sánchez APRN CNP     magnesium oxide (MAG-OX) 400 MG tablet Take 1 tablet (400 mg) by mouth daily Past Month at unknown Yes Ralph Aviles MD     Melatonin 10 MG TABS tablet Take 10 mg by mouth nightly as needed for sleep Past Week at hs Yes Unknown, Entered By History     Omega-3 Fatty Acids (FISH OIL PO) Take 1 capsule by mouth daily  Past Month at unknown Yes Reported, Patient     omeprazole (PRILOSEC) 40 MG DR capsule Take 1 capsule (40 mg) by mouth daily Past Week at am Yes Kylah Sánchez APRN CNP     vitamin B1 (THIAMINE) 100 MG tablet Take 1 tab 3 times daily by mouth for 1 week and then decrease to 1 tab daily. Past Month at unknown Yes Kylah Sánchez APRN CNP     Vitamin D, Cholecalciferol, 25 MCG (1000 UT) CAPS Take 2,000 Units by mouth daily Past Month at unknown Yes Kylah Sánchez APRN CNP

## 2023-08-24 NOTE — PROGRESS NOTES
"CLINICAL NUTRITION SERVICES - ASSESSMENT NOTE     Nutrition Prescription    RECOMMENDATIONS FOR MDs/PROVIDERS TO ORDER:  Recommend thiamine supplementation and MVT r/t potential micronutrient deficiency/displacement.     Malnutrition Status:    Severe malnutrition in the context of acute on chronic illness.    Recommendations already ordered by Registered Dietitian (RD):  Order updated weight for patient  Please send ensure clear with breakfast and dinner meals; hendricks     Future/Additional Recommendations:  Monitor patient weight, intakes, labs, and GI/BM  Monitor patient tolerance to supps     REASON FOR ASSESSMENT  Mihaela Vance is a/an 63 year old female assessed by the dietitian for Admission Nutrition Risk Screen for positive    NUTRITION HISTORY  Mihaela Vance is a 63 year old female who has medical history significant for chronic kidney disease, chronic pain, anxiety, history of alcoholism, with prior upper GI bleed. Patient notes she has not had anything to eat over the past few days. Per ED notes, Patient is difficult historian.     Patient scored positive on nutrition risk screen for unsure weight loss and decreased appetite.     Per neuro vascular notes \"She says she is here because she drinks too much. She also reports that she might have had a possible seizure before a prior admission for GI bleed where her  noticed convulsions. She keeps referring back to that admission and we had to redirect her multiple times.\"    Per patient interview  Patient reports that she has not been able to eat well for the past 2-3 days. She reports a chronic small appetite related to taste changes and food not tasting good. She states he the taste changes have resolved but previously this impacted her intakes. Patient has tried ensure enlive in the past and does not like them; she was agreeable to trying ensure cleat during her admission. Patient denied any weight loss but stated her  weight tends to fluctuate " between 115-130 lbs. Patient denied any chronic GI issues, Chewing/swallowing difficulties, and stated her nausea has improved with medications. RD went over the importance of adequate nutrition when not feeling well for the preservation of LBM.  CURRENT NUTRITION ORDERS  Diet: Orders Placed This Encounter      Combination Diet Regular Diet Adult      Intake/Tolerance: No recorded intakes in patient chart.    LABS  Labs reviewed  Sodium decreased-135 mg/dL  Potassium decreased-3.2 mg/dL  Chloride decreased-89 mmol/L  Urea Nitrogen elevated-76.9 mg/dL  Creatinine elevated-2.37 mg/dL  GFR estimate decreased-22 mL/min/1.73m2    MEDICATIONS  Medications reviewed  Scheduled: Norvasc, Tenormin, Pepcid, Prilosec, Zofran, Protonix, Senna  Continuous: Sodium chloride infusion with infuvite-150 mL/hr  PRN; Zofran    ANTHROPOMETRICS  Height: 0 cm (Data Unavailable)  Most Recent Weight: 52.3 kg (115 lb 4.8 oz)    IBW: 63.6 kg  BMI: Underweight BMI <18.5  Weight History:   Wt Readings from Last 15 Encounters:   08/24/23 52.3 kg (115 lb 4.8 oz)   03/23/23 58.5 kg (129 lb)   02/02/22 61.6 kg (135 lb 14.4 oz)   01/26/22 59.8 kg (131 lb 12.8 oz)   09/07/21 57.2 kg (126 lb)   07/29/20 66.2 kg (146 lb)   10/04/19 63.1 kg (139 lb 3.2 oz)   07/23/19 64.5 kg (142 lb 3.2 oz)   06/19/19 62.6 kg (138 lb)   05/01/19 62.6 kg (138 lb)   04/23/19 62.6 kg (138 lb)   04/23/19 63.2 kg (139 lb 6.4 oz)   08/21/18 67.1 kg (148 lb)   07/21/17 70.3 kg (155 lb)   02/01/17 77.6 kg (171 lb)   10.9% significant weight loss noted within 6 months.    Dosing Weight: 52.3 kg-actual BW used.    ASSESSED NUTRITION NEEDS  Estimated Energy Needs: 1,569-1,830 kcals/day (30-35 kcals/kg)  Justification: Increased needs  Estimated Protein Needs: 63-78 grams protein/day (1.2 - 1.5 grams of pro/kg)  Justification: Increased needs  Estimated Fluid Needs: 1,569-1,830  mL/day (1 mL/kcal)   Justification: Per provider pending fluid status    PHYSICAL FINDINGS  See  malnutrition section below.    MALNUTRITION  % Intake: Decreased intake does not meet criteria  % Weight Loss: > 10% in 6 months (severe)  Subcutaneous Fat Loss: Lower arm:  moderate  Muscle Loss: Patellar region:  moderate and Posterior calf:  severe  Fluid Accumulation/Edema: None noted  Malnutrition Diagnosis: Severe malnutrition in the context of acute on chronic illness.    NUTRITION DIAGNOSIS  Malnutrition related to inadequate oral intakes as evidenced by 10.9% weight loss noted within 6 months, moderate to severe muscle and fat loss.   INTERVENTIONS  Implementation  Nutrition education for nutrition relationship to health/disease   Collaboration with other providers-IDT rounds  Medical food supplement therapy-Please send ensure clear BID with breakfast and dinner meals.    Goals  Patient to consume % of nutritionally adequate meal trays TID, or the equivalent with supplements/snacks.     Monitoring/Evaluation  Progress toward goals will be monitored and evaluated per protocol.  Patricia Hilario RDN, ELE  Clinical Dietitian  Office: [phone]  Weekend pager: [phone]
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
## 2023-08-24 NOTE — ED NOTES
Pt cont to have scant amt emesis mostly spit, brown in color. Pt asking for water, pt is NPO given mouth swabs and vaseline for comfort. Bed alarm applied.    Spironolactone Pregnancy And Lactation Text: This medication can cause feminization of the male fetus and should be avoided during pregnancy. The active metabolite is also found in breast milk.

## 2023-08-24 NOTE — ED TRIAGE NOTES
called EMS when he found pt unresponsive at home. Hx of ETOH.     Triage Assessment       Row Name 08/24/23 2006       Triage Assessment (Adult)    Airway WDL WDL       Respiratory WDL    Respiratory WDL WDL       Skin Circulation/Temperature WDL    Skin Circulation/Temperature WDL WDL       Cardiac WDL    Cardiac WDL WDL       Peripheral/Neurovascular WDL    Peripheral Neurovascular WDL WDL       Cognitive/Neuro/Behavioral WDL    Cognitive/Neuro/Behavioral WDL WDL

## 2023-08-24 NOTE — PROGRESS NOTES
[facility] ADMISSION NOTE    Patient admitted to room 5-ICU at approximately 1330 via cart from emergency room. Patient was accompanied by  E D staff     Verbal SBAR report received from E D staff prior to patient arrival.     Patient trasferred to bed via air mallory. Patient alert and oriented X 1. The patient is not having any pain.  . Admission vital signs: Blood pressure (!) 119/92, pulse 90, temperature 99.5  F (37.5  C), temperature source Oral, resp. rate (!) 33, weight 52.3 kg (115 lb 4.8 oz), last menstrual period 09/16/2006, SpO2 93 %, not currently breastfeeding. significant other was oriented to plan of care, call light, bed controls, tv, telephone, bathroom, and visiting hours.     Risk Assessment    The following safety risks were identified during admission: skin. Yellow risk band applied: YES.     Skin Initial Assessment    This writer admitted this patient and completed a full skin assessment and Hugh score in the Adult PCS flowsheet. Appropriate interventions initiated as needed.     Secondary skin check completed by Bethany.    Hugh Risk Assessment  Sensory Perception: 3-->slightly limited  Moisture: 3-->occasionally moist  Activity: 3-->walks occasionally  Mobility: 3-->slightly limited  Nutrition: 2-->probably inadequate  Friction and Shear: 2-->potential problem  Hugh Score: 16  Sensory/Activity/Mobility Interventions: Heels suspended (pillows)  Moisture Interventions: Incontinence pad  Nutrition Interventions: Maintain adequate hydration  Friction/Shear Interventions: HOB 30 degrees or less  Mattress: Standard gel/foam mattress (IsoFlex, Atmos Air, etc.)  Bed Frame: Standard width and length  Informed Refusal Interventions: No    Education    Patient has a [place] to Observation order: No  Observation education completed and documented: N/A      Zita Rudolph RN

## 2023-08-24 NOTE — PROGRESS NOTES
Patient stated unable to go void at this time. Giving oral flds and allow pat to eat dinner-clear liqs..
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
## 2023-08-24 NOTE — H&P
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 Leukocytosis    COMPARISON: Chest radiograph 1/27/2022.      Impression    IMPRESSION: Stable cardiomediastinal silhouette. Low lung volumes with  linear opacities in the left lower lobe, favor atelectasis, less  likely consolidation/pneumonia. Possible trace left pleural effusion.  No pneumothorax. Bones are unchanged. Healed right clavicular and left  humeral fracture again noted.    CORNELIUS BOO MD         SYSTEM ID:  [id number]     I personally reviewed the chest x-ray image(s) showing linear consolidation of LLL, favoring atelectasis, the abdominal CT image(s) showing no evidence for acute pathology, the head CT image(s) showing subacute on chronic left convexity subdural hematoma, and the brain MRI image(s) showing redemonstration of left subdural hematoma with mild right sided midline shift, no hydrocephalus.

## 2023-08-25 NOTE — PROVIDER NOTIFICATION
"12:12: Dr. Valentin paged: \"Bed 5 L. Skarda: Pt's BP is soft at 80's/60's MAP in the 70's. Thanks! Karla RN\"  -Provider ordered a 500 ml LR bolus, this was administered.     14:35 : Dr. Valentin paged: \"Bed5 Mihaela Pinky:FYI unlikely to complete EEG today, won't get done till next Thurs. Also, keep pt ICU or switch med-surg? Do you want IV fluids? Karla\"  -Provider called back, ordered LR at 75, and transferred pt to med-surg status.    15:44: Dr. Valentin paged: \"Bed 5 L. Skarda: FYI the EEG people called back, they can do it on Sunday if you want. Sorry! Karla RN  -Plan for EEG on Sunday.  "

## 2023-08-25 NOTE — PLAN OF CARE
Assumed care [id number]  Neuro: A&Ox4.   Cardiac: SR. VSS. Low BP 80's/60's before EGD, provider notified and 500ml LR bolus administered.   Respiratory: Sating adequately on RA.  GI/: Adequate urine output. No BM's this shift.  Diet/appetite: NPO until EGD this afternoon, then tolerating clear liquid diet.   Activity:  Assist of 1, up to chair and ambulating to bathroom.  Pain: At acceptable level on current regimen.   Skin: No new deficits noted.  LDA's: PIV with LR at 75    Plan: Continue with POC. Notify primary team with changes. Plan for EEG on Sunday. Pt switched to med-surg status.

## 2023-08-25 NOTE — PLAN OF CARE
Goal Outcome Evaluation:progressing well.    End Of Shift Note    Situation:62 yo female admitted with 7mm subdural hematoma after fall with concerns for GI bleed, drop in Hgb to 6.2.    Plan: Continue to monitor Hgb, UGI EGD today with Dr. Gilliam.    Subjective/Objective:    Neuro: For most part Alert & Oriented, brief periods of confusion which is at baseline, steady when up with one assist for safety.  CIWA scores 3-5 past 12 hours.    Cardiac: SR rates 60's to 70's, with SBP's on soft side in 90's to 120's.    Resp: Lung sounds clear but diminished, on RA for evening but once fell asleep needed oxygen at 2 LPM to maintain saturations over 90%    GI/: Denies any nausea or pain, up to void twice overnight.  300 ml with dark brown/black green hued stools and incontinent to brief large amount urine before fully awake and up to commode + 50 ml at 6 am.     Endo: ICU protocal every 4 hours readings of 132, 100 & 107.    MSK: As above mentioned, up with one assist to Bedside commode, steady on feet.    Skin: slight reddened rash to sacrum and coccyx from green dark brown/black stools    LDAs: 2 PIV's, one infusing K+ replacement of 4 IV bags 10 MeQ each last one scheduled for 0700.

## 2023-08-25 NOTE — ANESTHESIA POSTPROCEDURE EVALUATION
Patient: Mihaela Vance    Procedure: Procedure(s):  ESOPHAGOGASTRODUODENOSCOPY, WITH BIOPSY       Anesthesia Type:  General    Note:  Disposition: Inpatient   Postop Pain Control: Uneventful            Sign Out: Well controlled pain   PONV: No   Neuro/Psych: Uneventful            Sign Out: Acceptable/Baseline neuro status   Airway/Respiratory: Uneventful            Sign Out: Acceptable/Baseline resp. status   CV/Hemodynamics: Uneventful            Sign Out: Acceptable CV status; No obvious hypovolemia; No obvious fluid overload   Other NRE: NONE   DID A NON-ROUTINE EVENT OCCUR? No           Last vitals:  Vitals:    08/25/23 1200 08/25/23 1211 08/25/23 1310   BP: (!) 83/62 (!) 87/68 124/79   Pulse: 59 60 82   Resp: 18 17    Temp:      SpO2: 93% 94% 100%       Electronically Signed By: RON Merchant CRNA  August 25, 2023  1:13 PM

## 2023-08-25 NOTE — PLAN OF CARE
Physical Therapy: Per rounds, pt transferring to higher level of care. Will defer PT evaluation to next level of care.

## 2023-08-25 NOTE — ANESTHESIA CARE TRANSFER NOTE
Patient: Mihaela Vance    Procedure: Procedure(s):  ESOPHAGOGASTRODUODENOSCOPY, WITH BIOPSY       Diagnosis: Upper GI bleed [K92.2]  Diagnosis Additional Information: No value filed.    Anesthesia Type:   General     Note:    Oropharynx: oropharynx clear of all foreign objects and spontaneously breathing  Level of Consciousness: drowsy  Oxygen Supplementation: nasal cannula  Level of Supplemental Oxygen (L/min / FiO2): 6  Independent Airway: airway patency satisfactory and stable  Dentition: dentition unchanged  Vital Signs Stable: post-procedure vital signs reviewed and stable  Report to RN Given: handoff report given  Patient transferred to: Phase II    Handoff Report: Identifed the Patient, Identified the Reponsible Provider, Reviewed the pertinent medical history, Discussed the surgical course, Reviewed Intra-OP anesthesia mangement and issues during anesthesia, Set expectations for post-procedure period and Allowed opportunity for questions and acknowledgement of understanding    Vitals:  Vitals Value Taken Time   /79 08/25/23 1310   Temp     Pulse 82 08/25/23 1310   Resp     SpO2 95 % 08/25/23 1311   Vitals shown include unvalidated device data.    Electronically Signed By: RON Merchant CRNA  August 25, 2023  1:13 PM
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
## 2023-08-25 NOTE — INTERVAL H&P NOTE
I have reviewed the surgical (or preoperative) H&P that is linked to this encounter, and examined the patient. There are no significant changes    Anemia (6/7); fall; subdural bleedLast EGD 2022; severe esophagitis; EtOH abuse; no noted varices    Clinical Conditions Present on Arrival:  Clinically Significant Risk Factors Present on Admission

## 2023-08-25 NOTE — PROGRESS NOTES
08/25/23 1109   Appointment Info   Signing Clinician's Name / Credentials (PT) Belen Salinas, PT   Living Environment   People in Home significant other   Current Living Arrangements house   Home Accessibility stairs to enter home   Number of Stairs, Main Entrance 8   Stair Railings, Main Entrance railings safe and in good condition   Transportation Anticipated family or friend will provide   Living Environment Comments all needs met on main level   Self-Care   Equipment Currently Used at Home walker, standard;cane, straight   Fall history within last six months yes   Activity/Exercise/Self-Care Comment pt indep with mobility, occ use of walker if feeling weak or for longer distances, indep with ADL's, shares IADL's with significant other.   General Information   Onset of Illness/Injury or Date of Surgery 08/24/23   Referring Physician Rodrigue Valentin MD   Patient/Family Therapy Goals Statement (PT) return home   Pertinent History of Current Problem (include personal factors and/or comorbidities that impact the POC) Mihaela Vance is a 63 year old female with past medical history of upper GI bleed, alcohol abuse, chronic kidney disease, chronic pain, and anxiety now presents on 8/24/2023 with syncope, altered mental status, and vomiting.   Existing Precautions/Restrictions fall   Pain Assessment   Patient Currently in Pain No   Range of Motion (ROM)   Range of Motion ROM is WFL   Strength (Manual Muscle Testing)   Strength Comments general LE weakness from reduced mobility   Bed Mobility   Comment, (Bed Mobility) supine<>sit with indep   Transfers   Comment, (Transfers) sit>stand from EOB with CGA and HHA   Gait/Stairs (Locomotion)   Comment, (Gait/Stairs) able to march in place with standing with CGA, amb not assessed due to fatigue and going for EGD this PM   Clinical Impression   Criteria for Skilled Therapeutic Intervention Yes, treatment indicated   PT Diagnosis (PT) impaired functional mobility   Influenced by  the following impairments LE weakness, reduced activity tolerance   Functional limitations due to impairments impaired transfers, gait, stairs   Clinical Presentation (PT Evaluation Complexity) Evolving/Changing   Clinical Presentation Rationale clinical reasoning   Clinical Decision Making (Complexity) low complexity   Planned Therapy Interventions (PT) gait training;home exercise program;patient/family education;stair training;strengthening;transfer training   Anticipated Equipment Needs at Discharge (PT)   (has walker)   Risk & Benefits of therapy have been explained evaluation/treatment results reviewed;care plan/treatment goals reviewed;risks/benefits reviewed;current/potential barriers reviewed;participants voiced agreement with care plan;participants included;patient   PT Total Evaluation Time   PT Eval, Low Complexity Minutes (18316) 15   Physical Therapy Goals   PT Frequency 5x/week   PT Predicted Duration/Target Date for Goal Attainment 09/01/23   PT Goals Transfers;Gait;Stairs   PT: Transfers Supervision/stand-by assist;Bed to/from chair   PT: Gait Supervision/stand-by assist;Standard walker;100 feet   PT: Stairs Supervision/stand-by assist;8 stairs;Rail on both sides   PT Discharge Planning   PT Plan Fri 1/5; amb with 2WW v. no device, stairs if returning home, LE strengthening   PT Discharge Recommendation (DC Rec) home with assist;Transitional Care Facility   PT Rationale for DC Rec limited evaluation today as pt going for EGD, abale to complete supine>sit independently and sit>stand with CGA/SBA, states she occassionally uses 2WW for mobility if needed, anticipate with increased activity and clinical progression, pt may be able to return home with support from significant other, will increase activity tomorrow and update recs as needed   PT Brief overview of current status sit>stand with CGA, able to march in place with CGA   Total Session Time   Total Session Time (sum of timed and untimed services)  15

## 2023-08-25 NOTE — PROGRESS NOTES
[facility]    Medicine Progress Note - Hospitalist Service    Date of Admission:  8/24/2023    Assessment & Plan     Mihaela Vance is a 63 year old female with past medical history of upper GI bleed, alcohol abuse, chronic kidney disease, chronic pain, and anxiety now presents on 8/24/2023 with syncope, altered mental status, and vomiting.     Status post fall  Left subdural hematoma   -Patient has history of multiple falls at home  - progressive decline in physical strength previous visits with physical therapy and chiropractor was told for additional care required  - Updated in the ED showed  -CT head which was significant for interval development of 7mm thick likely subacute on chronic left convexity subdural hematoma. No evidence of hemorrhage elsewhere and no midline shift   -MRI of the brain showed  -MRI showing redemonstration of left subdural hematoma/hygroma, overlying reactive dural thickening and mild rightward midline shift. No hydrocephalus.   -Neurologic team was consulted recommended neurochecks, routine EEG and repeat of CT head in 4 weeks time  - Past with neurosurgery  recommended to repeat CT of the head no acute finding , conservative management and PET/CT in 4 weeks time and follow-up with neurology as outpatient   -We will consult physical therapy    Upper GI bleed   Acute on chronic anemia likely from GI bleed  -Fecal occult blood positive no raghavendra bleeding  -IV Protonix   - History of upper GI bleed 1/20/2022 EGD showed severe circumferential erosive esophagitis without bleeding but friable  - Consulted on-call surgery and EGD planned for today  -Recent colonoscopy on 06/2020  Had 2 polyps that were removed    TIFFANY on CKD 3b  -Continue to trend creatinine  - Renal ultrasound Bilateral renal cortical thinning without hydronephrosis.   - urine protein/creat   - needs nephrology follow up     Alcohol use disorder   -Per patient 2-3X/week drinks a cocktail  require -7-  - Monitor for withdrawal on CIWA protocol  - Neurology consult EtOH use  - Consult on cessation of alcohol intake    Essential HTN   -Patient on amlodipine and atenolol    Hypokalemia  -Continue to replete  Hypermagnesemia  - improved     Hyperphosphatemia   - improved     SIRS-likely noninfectious  -Elevated lactic acid improved  - Elevated WBC trending down likely from hemoconcentration  - Chest x-ray clear, urinalysis unremarkable         Diet: NPO per Anesthesia Guidelines for Procedure/Surgery Except for: Meds    DVT Prophylaxis: Pneumatic Compression Devices  Soto Catheter: Not present  Lines: None     Cardiac Monitoring: ACTIVE order. Indication: ICU  Code Status: Full Code      Clinically Significant Risk Factors        # Hypokalemia: Lowest K = 2.9 mmol/L in last 2 days, will replace as needed    # Hypercalcemia: Highest Ca = 11.3 mg/dL in last 2 days, will monitor as appropriate   # Anion Gap Metabolic Acidosis: Highest Anion Gap = 29 mmol/L in last 2 days, will monitor and treat as appropriate  # Hypoalbuminemia: Lowest albumin = 3.1 g/dL at 8/25/2023  4:05 AM, will monitor as appropriate     # Hypertension: Noted on problem list         # Severe Malnutrition: based on nutrition assessment, PRESENT ON ADMISSION          Disposition Plan     Expected Discharge Date: 08/26/2023                  Rodrigue Valentin MD  Hospitalist Service  [facility]  Securely message with [employer] (more info)  Text page via [employer] Paging/Directory   ______________________________________________________________________    Interval History   Patient remained calm overnight, no nausea or vomiting or change in her vision  N.p.o. for possible EGD today      Physical Exam   Vital Signs: Temp: 98  F (36.7  C) Temp src: Oral BP: 92/71 Pulse: 73   Resp: 16 SpO2: 94 % O2 Device: None (Room air)    Weight: 115 lbs 4.81 oz    Constitutional: awake, alert, cooperative, no apparent distress, and appears  stated age  Eyes: Lids and lashes normal, pupils equal, round and reactive to light, extra ocular muscles intact, sclera clear, conjunctiva normal  ENT: Normocephalic, without obvious abnormality, atraumatic, sinuses nontender on palpation, external ears without lesions, oral pharynx with moist mucous membranes, tonsils without erythema or exudates, gums normal and good dentition.  Hematologic / Lymphatic: no cervical lymphadenopathy  Respiratory: No increased work of breathing, good air exchange, clear to auscultation bilaterally, no crackles or wheezing  Cardiovascular: Normal apical impulse, regular rate and rhythm, normal S1 and S2, no S3 or S4, and no murmur noted  GI: No scars, normal bowel sounds, soft, non-distended, non-tender, no masses palpated, no hepatosplenomegally  Genitounirinary:   Skin: no bruising or bleeding  Musculoskeletal: There is no redness, warmth, or swelling of the joints.  Full range of motion noted.  Motor strength is 5 out of 5 all extremities bilaterally.  Tone is normal.  Neurologic: Awake, alert, oriented to name, place and time.  Cranial nerves II-XII are grossly intact.  Motor is 5 out of 5 bilaterally.  Cerebellar finger to nose, heel to shin intact.  Sensory is intact.  Babinski down going, Romberg negative, and gait is normal.  Neuropsychiatric: General: normal and normal eye contact    Medical Decision Making       52 MINUTES SPENT BY ME on the date of service doing chart review, history, exam, documentation & further activities per the note.      Data     I have personally reviewed the following data over the past 24 hrs:    12.4 (H)  \   7.8 (L)   / 280     136 97 (L) 53.6 (H) /  101 (H)   3.2 (L) 32 (H) 1.72 (H) \     ALT: 18 AST: 36 AP: 67 TBILI: 0.6   ALB: 3.1 (L) TOT PROTEIN: 5.7 (L) LIPASE: N/A     TSH: N/A T4: N/A A1C: 4.9     Procal: 0.69 (H) CRP: N/A Lactic Acid: 1.2

## 2023-08-26 NOTE — PROGRESS NOTES
[facility]    Medicine Progress Note - Hospitalist Service    Date of Admission:  8/24/2023    Assessment & Plan     Mihaela Vance is a 63 year old female with past medical history of upper GI bleed, alcohol abuse, chronic kidney disease, chronic pain, and anxiety now presents on 8/24/2023 with syncope, altered mental status, and vomiting.     Status post fall  Left subdural hematoma   -Patient has history of multiple falls at home  - progressive decline in physical strength previous visits with physical therapy and chiropractor was told no additional care required    -CT head which was significant for interval development of 7mm thick likely subacute on chronic left convexity subdural hematoma. No evidence of hemorrhage elsewhere and no midline shift   -MRI showing redemonstration of left subdural hematoma/hygroma, overlying reactive dural thickening and mild rightward midline shift. No hydrocephalus.   -Neurologic team was consulted recommended neurochecks, routine EEG and repeat of CT head in 4 weeks time  - Past with neurosurgery  recommended to repeat CT of the head no acute finding , conservative management and PET/CT in 4 weeks time and follow-up with neurology as outpatient   -EEG pending and if unremarkable will discharge  tomorrow   - pt declined PT     Upper GI bleed   Acute on chronic anemia likely from GI bleed  -Fecal occult blood positive no raghavendra bleeding  -PO   - History of upper GI bleed 1/20/2022 EGD showed severe circumferential erosive esophagitis without bleeding but friable  -Recent colonoscopy on 06/2020  Had 2 polyps that were removed    TIFFANY on CKD 3b  -Continue to trend creatinine  - Renal ultrasound Bilateral renal cortical thinning without hydronephrosis.   - urine protein/creat   - needs nephrology follow up out pt     Alcohol use disorder   -Per patient 2-3X/week drinks a cocktail require -7-  - Monitor for withdrawal on CIWA protocol  - Neurology  consult EtOH use  - Counseled cessation of alcohol intake    Essential HTN   -Patient on amlodipine and atenolol    Hypokalemia  -Continue to replete  Hypermagnesemia  - improved     Hyperphosphatemia   - improved     SIRS-likely noninfectious  -Elevated lactic acid improved  - Elevated WBC trending down likely from hemoconcentration  - Chest x-ray clear, urinalysis unremarkable         Diet: 2 Gram Sodium Diet    DVT Prophylaxis: Pneumatic Compression Devices  Soto Catheter: Not present  Lines: None     Cardiac Monitoring: ACTIVE order. Indication: ICU  Code Status: Full Code      Clinically Significant Risk Factors        # Hypokalemia: Lowest K = 2.9 mmol/L in last 2 days, will replace as needed     # Hypomagnesemia: Lowest Mg = 1.6 mg/dL in last 2 days, will replace as needed   # Hypoalbuminemia: Lowest albumin = 3.1 g/dL at 8/25/2023  4:05 AM, will monitor as appropriate       # Hypertension: Noted on problem list         # Severe Malnutrition: based on nutrition assessment  , PRESENT ON ADMISSION          Disposition Plan     Expected Discharge Date: 08/26/2023                  Rodrigue Valentin MD  Hospitalist Service  [facility]  Securely message with [employer] (more info)  Text page via [employer] Paging/Directory   ______________________________________________________________________    Interval History   Patient remained calm overnight, no nausea or vomiting or change in her vision  N.p.o. for possible EGD today      Physical Exam   Vital Signs: Temp: 98.1  F (36.7  C) Temp src: Oral BP: 91/70 Pulse: 64   Resp: 16 SpO2: 99 % O2 Device: None (Room air)    Weight: 124 lbs 1.9 oz    Constitutional: awake, alert, cooperative, no apparent distress, and appears stated age  Eyes: Lids and lashes normal, pupils equal, round and reactive to light, extra ocular muscles intact, sclera clear, conjunctiva normal  ENT: Normocephalic, without obvious abnormality, atraumatic, sinuses nontender on  palpation, external ears without lesions, oral pharynx with moist mucous membranes, tonsils without erythema or exudates, gums normal and good dentition.  Hematologic / Lymphatic: no cervical lymphadenopathy  Respiratory: No increased work of breathing, good air exchange, clear to auscultation bilaterally, no crackles or wheezing  Cardiovascular: Normal apical impulse, regular rate and rhythm, normal S1 and S2, no S3 or S4, and no murmur noted  GI: No scars, normal bowel sounds, soft, non-distended, non-tender, no masses palpated, no hepatosplenomegally  Genitounirinary:   Skin: no bruising or bleeding  Musculoskeletal: There is no redness, warmth, or swelling of the joints.  Full range of motion noted.  Motor strength is 5 out of 5 all extremities bilaterally.  Tone is normal.  Neurologic: Awake, alert, oriented to name, place and time.  Cranial nerves II-XII are grossly intact.  Motor is 5 out of 5 bilaterally.  Cerebellar finger to nose, heel to shin intact.  Sensory is intact.  Babinski down going, Romberg negative, and gait is normal.  Neuropsychiatric: General: normal and normal eye contact    Medical Decision Making       52 MINUTES SPENT BY ME on the date of service doing chart review, history, exam, documentation & further activities per the note.      Data     I have personally reviewed the following data over the past 24 hrs:    9.9  \   7.4 (L)   / 264     134 (L) 100 23.0 /  103 (H)   3.7 27 1.16 (H) \

## 2023-08-26 NOTE — PLAN OF CARE
[id number]  Neuro: A&Ox4.   Cardiac: SR. VSS.   Respiratory: Sating adequately on RA.  GI/: Adequate urine output. BM x1 today.  Diet/appetite: Tolerating regular diet.   Activity:  Assist of 1, walker, gait steady.  Pain: At acceptable level on current regimen.   Skin: No new deficits noted.  LDA's: PIV    Plan: Continue with POC. Notify primary team with changes.

## 2023-08-27 NOTE — PROGRESS NOTES
"End Of Shift Note    Pt has been A&O- intermittent confusion noted. CIWA @ 2100: 3. No ETOH withdrawal medications have been ordered.  Pt became nauseated and vomited x1. Zofran given. Pt states she has \"acid reflux\". Pt was going to be transferred to Med/surg floor-ultimately stayed in ICU. Pt had removed her IV- stating well I don't need this anymore. Writer educated on importance of having vascular access. IV replaced. Assist of 1 with walker, pt refuses transfer belt.    "

## 2023-08-27 NOTE — PROGRESS NOTES
This writer in room for first 15 minutes blood infusing.  Patient up to Bedside commode to void delaying start of blood slightly until back to bed and settled.  No signs of reaction noted during this 15 minutes and continue with VS as per protocol..

## 2023-08-27 NOTE — TELEPHONE ENCOUNTER
Reason for Call:  Appointment Request    Patient requesting this type of appt:  Hospital/ED Follow-Up     Requested provider: Kylah Sánchez    Reason patient unable to be scheduled: Not within requested timeframe    When does patient want to be seen/preferred time: 3-7 days    Comments: Hospital Dr recommended CBC and CMP at follow up and patient be seen within 7 days, but there isn't anything for two weeks.  Please call the patient on her landline, and leave a msg if she doesn't answer.      Okay to leave a detailed message?: Yes at Home number on file [phone] (home)    Call taken on 8/27/2023 at 3:05 PM by Nichole Metzger

## 2023-08-27 NOTE — DISCHARGE SUMMARY
[facility]  Hospitalist Discharge Summary       Date of Admission:  8/24/2023  Date of Discharge:  8/27/2023  Discharging Provider: Osorio Stewart MD      Discharge Diagnoses     Status post fall  Left subdural hematoma   Upper GI bleed   Acute on chronic anemia likely from GI bleed  TIFFANY on CKD 3b  Alcohol use disorder   Essential hypertension  Hypokalemia  Hypermagnesemia  Hyperphosphatemia   SIRS-likely noninfectious    Follow-ups Needed After Discharge   Follow-up Appointments     Follow-up and recommended labs and tests       Follow up with primary care provider, Kylah Sánchez, within 7 days   for hospital follow- up.  The following labs/tests are recommended: CBC   and CMP.          Unresulted Labs Ordered in the Past 30 Days of this Admission       Date and Time Order Name Status Description    8/27/2023  5:20 AM Prepare red blood cells (unit) Preliminary     8/25/2023 12:48 PM Surgical Pathology Exam In process         These results will be followed up by Osorio Stewart or PCP    Discharge Disposition   Discharged to home  Condition at discharge: Stable    Hospital Course   Mihaela Vance is a 63 year old female with past medical history of upper GI bleed, alcohol abuse, chronic kidney disease, chronic pain, and anxiety now presents on 8/24/2023 with syncope, altered mental status, and vomiting.      Status post fall  Left subdural hematoma   Patient has history of multiple falls at home  - Progressive decline in physical strength previous visits with physical therapy and chiropractor was told no additional care required  - CT head which was significant for interval development of 7mm thick likely subacute on chronic left convexity subdural hematoma. No evidence of hemorrhage elsewhere and no midline shift   - MRI showing redemonstration of left subdural hematoma/hygroma, overlying reactive dural thickening and mild rightward midline shift. No hydrocephalus.   - Neurologic  team was consulted recommended neurochecks, routine EEG and repeat of CT head in 4 weeks time  - EEG performed on 8/27.  Official read is pending at time of discharge.  - Patient declined PT     Plan:  - Discharged home  - Follow-up results of EEG  - Neurosurgery follow-up in 4 weeks with repeat head CT prior to appointment     Upper GI bleed   Acute on chronic anemia likely from GI bleed  - Fecal occult blood positive no raghavendra bleeding  - History of upper GI bleed 1/20/2022 EGD showed severe circumferential erosive esophagitis without bleeding but friable  - Transfused 2 units during hospitalization    Plan:  - Carafate 4 times daily for 2 weeks  - Protonix 40 mg twice daily for 3 months  - Follow-up CBC with PCP  - Avoid aspirin and NSAIDs     TIFFANY on CKD 3b  - Continue to trend creatinine  - Renal ultrasound bilateral renal cortical thinning without hydronephrosis.   - Urine protein/creat normal  - Follow-up BMP with PCP     Alcohol use disorder   - Per patient 2-3X/week drinks a cocktail  - No evidence of withdrawal on CIWA protocol  - Counseled cessation of alcohol intake     Essential hypertension  -Patient on amlodipine and atenolol     Hypokalemia  3.3 on presentation.  - Replace per protocol, normal at time of discharge    Hypermagnesemia  2.7 at time of admission  - 1.5 at discharge     Hyperphosphatemia   6.5 at admission  - 2.6 at discharge     SIRS-likely noninfectious  Sepsis criteria based on WBC 21.9, pulse 112  - Elevated WBC trending down likely from hemoconcentration, normal at discharge  - Chest x-ray clear, urinalysis unremarkable     Consultations This Hospital Stay   SURGERY GENERAL IP CONSULT  PHYSICAL THERAPY ADULT IP CONSULT  SOCIAL WORK IP CONSULT  PHYSICAL THERAPY ADULT IP CONSULT  CARE MANAGEMENT / SOCIAL WORK IP CONSULT    Code Status   Full Code    Time Spent on this Encounter   I, Osorio Stewart MD, personally saw the patient today and spent greater than 30 minutes discharging  this patient.       Osorio Stewart MD  [facility]  ______________________________________________________________________    Physical Exam   Vital Signs: Temp: 98.6  F (37  C) Temp src: Oral BP: 98/69 Pulse: 78   Resp: 18 SpO2: 98 % O2 Device: None (Room air)    Weight: 124 lbs 1.9 oz       Gen: Well nourished, debilitated female, alert and oriented x 1, no acute distressed  HEENT: Atraumatic, normocephalic; sclera non-injected, anicterric; oral mucosa moist, no lesion, no exudate  Lungs: Clear to ausculation, no wheezes, no rhonchi, no rales  Heart: Regular rate, regular rhythm, no gallops, no rubs, no murmurs  GI: Bowel sound normal, no hepatosplenomegaly, no masses, non-tender, non-distended, no guarding, no rebound tenderness  Lymph: No lymphadenopathy, no edema  Skin: No rashes, no chronic venous stasis     Primary Care Physician   Kylah Sánchez    Discharge Orders      Neurosurgery Referral      Reason for your hospital stay    This is a 63-year-old female was admitted with an episode of loss consciousness, found to have a subdural hematoma.     Follow-up and recommended labs and tests     Follow up with primary care provider, Kylah Sánchez, within 7 days for hospital follow- up.  The following labs/tests are recommended: CBC and CMP.     Activity    Your activity upon discharge: activity as tolerated     Full Code     Diet    Follow this diet upon discharge: Orders Placed This Encounter      2 Gram Sodium Diet         Significant Results and Procedures   Most Recent 3 CBC's:  Recent Labs   Lab Test 08/27/23  0415 08/26/23  0507 08/25/23  0405   WBC 7.3 9.9 12.4*   HGB 6.9* 7.4* 7.8*   MCV 94 94 90    264 280     Most Recent 3 BMP's:  Recent Labs   Lab Test 08/27/23  0415 08/26/23  0507 08/25/23  1117 08/25/23  0835 08/25/23  0405   * 134*  --   --  136   POTASSIUM 3.6 3.7 3.7  --  3.2*   CHLORIDE 103 100  --   --  97*   CO2 26 27  --   --  32*   BUN  14.4 23.0  --   --  53.6*   CR 1.11* 1.16*  --   --  1.72*   ANIONGAP 6* 7  --   --  7   SUE 8.4* 8.5*  --   --  8.3*   * 103*  --  101* 99   ,   Results for orders placed or performed during the hospital encounter of 08/24/23   CT Head w/o Contrast     Value    Radiologist flags Left convexity subdural collection (AA)    Narrative    EXAM: CT HEAD W/O CONTRAST  LOCATION: [facility]  DATE: 8/24/2023    INDICATION: Confusion.  Altered.  COMPARISON: 01/27/2022.  TECHNIQUE: Routine CT Head without IV contrast. Multiplanar reformats. Dose reduction techniques were used.    FINDINGS:  INTRACRANIAL CONTENTS: Interval development of a predominantly CSF attenuation left convexity subdural collection with mild increased attenuation in its lateral margin. This measures 7 mm in maximum thickness. No hyperdense hemorrhage elsewhere. Volume   loss and presumed chronic small vessel ischemia are stable. No midline shift. No hydrocephalus. No CT evidence of acute infarct.    VISUALIZED ORBITS/SINUSES/MASTOIDS: No intraorbital abnormality. No paranasal sinus mucosal disease. No middle ear or mastoid effusion.    BONES/SOFT TISSUES: No acute abnormality.      Impression    IMPRESSION:  1.  Interval development of a 7 mm thick likely subacute on chronic left convexity subdural hematoma. No hemorrhage elsewhere.    2.  No midline shift.    3.  Stable age-related changes.      [Critical Result: Left convexity subdural collection    Finding was identified on 8/24/2023 5:09 AM CDT.     Dr. Rojas was contacted by me on 8/24/2023 5:19 AM CDT and verbalized understanding of the critical result.                    CT Abdomen Pelvis w/o Contrast    Narrative    EXAM: CT ABDOMEN PELVIS W/O CONTRAST  LOCATION: [facility]  DATE: 8/24/2023    INDICATION: Abd pain.  Upper GI bleed.  Alcohol abuse.  Elevated WBC.  COMPARISON: None available.  TECHNIQUE: CT scan of the abdomen and pelvis  was performed without intravenous contrast Multiplanar reformats were obtained. Dose reduction techniques were used.    FINDINGS:   LOWER CHEST: Moderate-sized hiatal hernia.    ABDOMEN/PELVIS: Limited evaluation of the abdominal organs due to lack of intravenous contrast.    HEPATOBILIARY: Diffuse hypodense appearance of the liver, likely due to underlying hepatic steatosis.    The gallbladder is unremarkable.    PANCREAS: The unenhanced pancreas is grossly unremarkable.    SPLEEN: No splenomegaly.    ADRENAL GLANDS: No adrenal nodules.    KIDNEYS/BLADDER: No radiodense kidney/ureteral stones or hydronephrosis in either kidney.    BOWEL: No abnormally dilated bowel loops. Colonic diverticulosis without CT evidence of acute diverticulitis.    PERITONEUM: No evidence of free fluid in the abdomen and pelvis. No free peritoneal or portal venous gas.    PELVIC ORGANS: Unremarkable.    VASCULATURE: Moderate atherosclerotic vascular calcification of the abdominal aorta and iliac vessels.    LYMPH NODES: Unremarkable.    MUSCULOSKELETAL: Diffuse osteopenia with multilevel degenerative changes. Multiple compression deformities of the visualized thoracic and lumbar spine most prominent at T10 with approximately 90% vertebral height loss, T11 with approximately 50%   vertebral height loss, T12 and L3 with approximately 40% vertebral height loss. Small fat-containing umbilical hernia.      Impression    IMPRESSION: Within the limitation of noncontrast exam, there is;  1. No evidence of acute pathology in the abdomen and pelvis.  2. Hepatic steatosis.  3. Colonic diverticulosis without CT evidence of acute diverticulitis.  4. Multiple likely old compression deformities of the visualized lower thoracic and lumbar spine.               MR Brain w/o & w Contrast    Narrative    MR BRAIN WITHOUT AND WITH CONTRAST   8/24/2023 11:55 AM     HISTORY: Falls and subdural hematoma by CT.       COMPARISON: 8/24/2023 head CT at 0453  hours    TECHNIQUE: Multiplanar, multisequence MR imaging of the head obtained  prior to, and after, intravenous contrast administration    CONTRAST: 5.5 ML GADAVIST.    FINDINGS:  Left subdural largely CSF signal fluid collection measures 7-8 mm with  overlying dural thickening and enhancement. Trace 1-2 mm of rightward  midline shift. Confluent periventricular areas of T2 FLAIR white  matter hyperintensities, moderate to advanced symmetric generalized  parenchymal volume loss and compensatory ventricular dilatation. No  MRI findings of acute hydrocephalus. Preserved major intracranial  vascular flow voids. No pathologic intraparenchymal enhancement.  Gyriform areas of signal loss on susceptibility-weighted images  coating the left cerebral convexities compatible with chronic  siderosis.    Normal skull marrow signal. No substantial paranasal sinus mucosal  disease. Clear mastoid air cells. Nonfocal pituitary gland, sella,  skull base and upper cervical spinal structures. The orbits are  normal.      Impression    IMPRESSION:  1. Redemonstration of left subdural hematoma/hygroma, overlying  reactive dural thickening and mild rightward midline shift. No  hydrocephalus.  2. Brain atrophy and leukoaraiosis, presumed sequela of chronic  microvascular ischemic disease, as detailed.    KAREN LEÓN DO         SYSTEM ID:  [id number]   US Renal Complete Non-Vascular    Narrative    US RENAL COMPLETE NON-VASCULAR   8/24/2023 3:56 PM     HISTORY: Obstructive uropathy and acute kidney injury    COMPARISON: CT abdomen and pelvis 8/24/2023    FINDINGS:  Right Kidney: 8.5 cm in length without hydronephrosis. There is  diffuse cortical thinning. Normal renal cortical echogenicity.    Left Kidney: 9.2 cm in length. There is diffuse renal cortical  thinning. Normal renal cortical echogenicity. No hydronephrosis.    Bladder: Partially distended urinary bladder is unremarkable.      Impression    IMPRESSION:   1. Bilateral renal  cortical thinning without hydronephrosis.    KAERN PARKER MD         SYSTEM ID:  [id number]   XR Chest Port 1 View    Narrative    XR CHEST PORT 1 VIEW   8/24/2023 4:05 PM     HISTORY: Leukocytosis    COMPARISON: Chest radiograph 1/27/2022.      Impression    IMPRESSION: Stable cardiomediastinal silhouette. Low lung volumes with  linear opacities in the left lower lobe, favor atelectasis, less  likely consolidation/pneumonia. Possible trace left pleural effusion.  No pneumothorax. Bones are unchanged. Healed right clavicular and left  humeral fracture again noted.    CORNELIUS BOO MD         SYSTEM ID:  [id number]   CT Head w/o Contrast    Narrative    CT SCAN OF THE HEAD WITHOUT CONTRAST   8/25/2023 10:56 AM     HISTORY: Re-assess subdural hematoma.    TECHNIQUE:  Axial images of the head and coronal reformations without  IV contrast material. Radiation dose for this scan was reduced using  automated exposure control, adjustment of the mA and/or kV according  to patient size, or iterative reconstruction technique.    COMPARISON: Head CT 8/24/2023      Impression    IMPRESSION: No change. Thin subdural hematoma along the left convexity  with mild mass effect, unchanged.    REBECCA HIDALGO MD         SYSTEM ID:  [id number]       Discharge Medications   Current Discharge Medication List        START taking these medications    Details   pantoprazole (PROTONIX) 40 MG EC tablet Take 1 tablet (40 mg) by mouth 2 times daily (before meals)  Qty: 60 tablet, Refills: 0    Associated Diagnoses: UGIB (upper gastrointestinal bleed)      sucralfate (CARAFATE) 1 GM/10ML suspension Take 10 mLs (1 g) by mouth 4 times daily (before meals and nightly) for 13 days  Qty: 520 mL, Refills: 0    Associated Diagnoses: UGIB (upper gastrointestinal bleed)           CONTINUE these medications which have NOT CHANGED    Details   alum & mag hydroxide-simethicone (MYLANTA/MAALOX) 200-200-20 MG/5ML SUSP suspension Take 30 mLs by mouth 2 times  daily      B Complex Vitamins (B COMPLEX PO) Take 1 tablet by mouth daily.    Associated Diagnoses: Generalized anxiety disorder; Panic disorder without agoraphobia      busPIRone HCl (BUSPAR) 30 MG tablet Take 1 tablet (30 mg) by mouth 2 times daily  Qty: 180 tablet, Refills: 3    Comments: HOLD ON FILE until patient requests or is due for refill.  Associated Diagnoses: Anxiety      calcium carbonate (TUMS) 500 MG chewable tablet Take 2 chew tab by mouth 3 times daily      Calcium Carbonate-Vitamin D (CALCIUM + D PO) Take 2 tablets by mouth daily.    Associated Diagnoses: Generalized anxiety disorder; Panic disorder without agoraphobia      diphenhydrAMINE (BENADRYL) 25 MG tablet Take 25 mg by mouth every 6 hours as needed for itching or allergies      ferrous sulfate (FEROSUL) 325 (65 Fe) MG tablet Take 1 tablet (325 mg) by mouth daily (with breakfast) Take with orange juice to enhance absorption.  Qty: 100 tablet, Refills: 3    Associated Diagnoses: Iron deficiency anemia due to chronic blood loss      FOLIC ACID PO Take 400 mcg by mouth daily      hydrOXYzine (ATARAX) 25 MG tablet Take 1 tablet (25 mg) by mouth nightly as needed for itching  Qty: 120 tablet, Refills: 4    Associated Diagnoses: Anxiety      magnesium oxide (MAG-OX) 400 MG tablet Take 1 tablet (400 mg) by mouth daily  Qty: 60 tablet, Refills: 3    Associated Diagnoses: Anemia      Melatonin 10 MG TABS tablet Take 10 mg by mouth nightly as needed for sleep      Omega-3 Fatty Acids (FISH OIL PO) Take 1 capsule by mouth daily     Associated Diagnoses: Generalized anxiety disorder; Panic disorder without agoraphobia      vitamin B1 (THIAMINE) 100 MG tablet Take 1 tab 3 times daily by mouth for 1 week and then decrease to 1 tab daily.  Qty: 90 tablet, Refills: 3    Associated Diagnoses: Thiamine deficiency neuropathy      Vitamin D, Cholecalciferol, 25 MCG (1000 UT) CAPS Take 2,000 Units by mouth daily           STOP taking these medications        amLODIPine (NORVASC) 2.5 MG tablet Comments:   Reason for Stopping:         atenolol (TENORMIN) 50 MG tablet Comments:   Reason for Stopping:         omeprazole (PRILOSEC) 40 MG DR capsule Comments:   Reason for Stopping:             Allergies   Allergies   Allergen Reactions    Lisinopril Other (See Comments)     Terrible taste to food    Ativan Nausea and Vomiting    Lorazepam Nausea and Vomiting    Sertraline Other (See Comments)     Irritation and tightness in throat    Tizanidine Other (See Comments)     Fainting     Quetiapine Nausea     Other reaction(s): Other (See Comments)  Very tired, couldn't do anything    Seroquel [Quetiapine Fumarate] Other (See Comments) and Nausea     Very tired, couldn't do anything    Hydrochlorothiazide Hives and Rash

## 2023-08-27 NOTE — PROGRESS NOTES
End Of Shift Note    Plan: EEG tomorrow with possible discharge home.    Subjective/Objective:    Neuro: pt alert and oriented.    Cardiac: VSS.    Resp: lung sounds clear, on RA.    GI/: pt going to restroom, no BM for this shift.    MSK: pt was steady when up walking. Has gait belt and walker in room.    Skin: skin dry and intact.    LDAs: PIV S/L.

## 2023-08-27 NOTE — PLAN OF CARE
Goal Outcome Evaluation: Progressing slowly.  Morning Hgb was 6.9 and per [facility]-Dr. Tiara Moreno will transfuse one unit this am.

## 2023-08-27 NOTE — PROGRESS NOTES
End Of Shift Note    Situation:62 yo female admitted with GI bleed    Plan: Hgb this am was 6.9, orders to transfuse one unit of PRBC's, also plan for EEG and if stable will possible go home later today.    Subjective/Objective:    Neuro: Alert & oriented X 4    Cardiac: Vital signs stable, SR 60-70's, SBP in the 100-110's    Resp: Lung sounds clear, remains on RA    GI/: Up to BR for stool and voiding about 4 am with stool brown yellow in color.  Voiding in 200 -30 ml amounts.    MSK: Patient steady when up ambulating to BR, gait belt and walker in use & up and about in room    Skin: red eunice-area improved and no issues noted.  Skin dry and intact.    LDAs: one PIV, infusing blood at present.

## 2023-08-27 NOTE — PROGRESS NOTES
[facility] DISCHARGE NOTE    Patient discharged to home at 3:53 PM via wheel chair. Accompanied by significant other and staff. Discharge instructions reviewed with patient and other: significant other, opportunity offered to ask questions. Prescriptions sent to patients preferred pharmacy. All belongings sent with patient.    Nichole Long RN

## 2023-08-28 NOTE — TELEPHONE ENCOUNTER
Patient Contact    Attempt # 1    Was call answered?  No.  Left message on voicemail with information to call back.    Ginette Akhtar RN on 8/28/2023 at 1:16 PM

## 2023-08-28 NOTE — PROGRESS NOTES
EEG brief note.  7 Hz PDA, no interictal epileptiform discharges, and no clinical or subclinical seizures.  Full procedure note to follow.  Rambo Myles MD.

## 2023-08-28 NOTE — TELEPHONE ENCOUNTER
Received call back from Patient.  Patient has follow up appointment already scheduled for 9/14/23 at 1015.  Verbalized understanding.  Andrez Puri RN

## 2023-08-28 NOTE — PROGRESS NOTES
Clinic Care Coordination Contact  Community Health Worker Initial Outreach    Patient accepts CC: No, Pt declined needs for extra support.     Clinic Care Coordination Contact  [facility]: Post-Discharge Note  SITUATION                                                      Admission:    Admission Date: 08/24/23   Reason for Admission: Status post fall  Left subdural hematoma   Upper GI bleed   Acute on chronic anemia likely from GI bleed  TIFFANY on CKD 3b  Alcohol use disorder   Essential hypertension  Hypokalemia  Hypermagnesemia  Hyperphosphatemia   SIRS-likely noninfectious  Discharge:   Discharge Date: 08/27/23  Discharge Diagnosis: Status post fall  Left subdural hematoma   Upper GI bleed   Acute on chronic anemia likely from GI bleed  TIFFANY on CKD 3b  Alcohol use disorder   Essential hypertension  Hypokalemia  Hypermagnesemia  Hyperphosphatemia   SIRS-likely noninfectious    BACKGROUND                                                      Per hospital discharge summary and inpatient provider notes:    Mihaela Vance is a 63 year old female with past medical history of upper GI bleed, alcohol abuse, chronic kidney disease, chronic pain, and anxiety now presents on 8/24/2023 with syncope, altered mental status, and vomiting.      Status post fall  Left subdural hematoma     ASSESSMENT           Discharge Assessment  How are you doing now that you are home?: doing well  How are your symptoms? (Red Flag symptoms escalate to triage hotline per guidelines): Improved  Do you feel your condition is stable enough to be safe at home until your provider visit?: Yes  Does the patient have their discharge instructions? : Yes  Does the patient have questions regarding their discharge instructions? : No  Were you started on any new medications or were there changes to any of your previous medications? : Yes  Does the patient have all of their medications?: Yes  Do you have questions regarding any of your medications? :  No  Do you have all of your needed medical supplies or equipment (DME)?  (i.e. oxygen tank, CPAP, cane, etc.): Yes  Discharge follow-up appointment scheduled within 14 calendar days? : Yes  Discharge Follow Up Appointment Date: 09/07/23  Discharge Follow Up Appointment Scheduled with?: Primary Care Provider    Post-op ([facility] Only)  If the patient had a surgery or procedure, do they have any questions for a nurse?: No         PLAN                                                      Outpatient Plan:      Follow-up and recommended labs and tests      Follow up with primary care provider, Kylah Sánchez, within 7 days for hospital follow- up.  The following labs/tests are recommended: CBC and CMP.        Future Appointments   Date Time Provider Department Center   9/14/2023 10:30 AM Kylah Sánchez APRN CNP [facility]     For any urgent concerns, please contact our 24 hour nurse triage line: [phone]     Daya, IRINAW  [phone]  [facility]

## 2023-10-02 NOTE — PROGRESS NOTES
Mihaela is a 63 year old who is being evaluated via a billable telephone visit.      What phone number would you like to be contacted at? [phone]  How would you like to obtain your AVS? Mail a copy    Distant Location (provider location):  On-site    Assessment & Plan     UGIB (upper gastrointestinal bleed)    - CBC with platelets and differential; Future    Stage 3a chronic kidney disease (H)    - Comprehensive metabolic panel (BMP + Alb, Alk Phos, ALT, AST, Total. Bili, TP); Future    Gastrointestinal hemorrhage, unspecified gastrointestinal hemorrhage type    - omeprazole (PRILOSEC) 40 MG DR capsule; Take 1 capsule (40 mg) by mouth daily             FUTURE APPOINTMENTS:       - Follow-up visit in 3 months, schedule lab draw.    RON Lora [facility]   Mihaela is a 63 year old, presenting for the following health issues:  Recheck Medication        10/2/2023     3:30 PM   Additional Questions   Roomed by Vy VILLALOBOS CMA       HPI     Follow Up on medications    Medication Followup of Protonix  Taking Medication as prescribed: NO-wasn't helping, went back to Omeprazole, works better  Side Effects:  n/a  Medication Helping Symptoms:  yes    Needs follow up labs from inpatient admission.      Review of Systems   Constitutional, HEENT, cardiovascular, pulmonary, gi and gu systems are negative, except as otherwise noted.      Objective    Vitals - Patient Reported  Pain Score: No Pain (0)      Vitals:  No vitals were obtained today due to virtual visit.    Physical Exam   alert and no distress  PSYCH: Alert and oriented times 3; coherent speech, normal   rate and volume, able to articulate logical thoughts, able   to abstract reason, no tangential thoughts, no hallucinations   or delusions  Her affect is normal and pleasant  RESP: No cough, no audible wheezing, able to talk in full sentences  Remainder of exam unable to be completed due to telephone visits           Phone call duration: 12 minutes

## 2023-10-20 NOTE — TELEPHONE ENCOUNTER
NEUROSURGERY- NEW PREVISIT PLANNING       Record Status/Location     Referring Provider Referral   Osorio Stewart MD      Diagnosis Referral S06.5XAA (ICD-10-CM) - Subdural hematoma (H)    MRI (HEAD, NECK, SPINE) Pacs Brain 8/24/23 [facility]    CT Pacs Head 8/25/23     [facility]      X-ray Na    INJECTION Na    PHYSICAL THERAPY Na    SURGERY Na

## 2023-11-01 NOTE — TELEPHONE ENCOUNTER
Please have her restart her Atenolol 50 mg and her Amlodipine 2.5 mg. RN recheck in 2-3 weeks. Thank you.

## 2023-11-01 NOTE — PROGRESS NOTES
Mihaela Vance is a 63 year old year old patient who comes in today for a Blood Pressure check because of medication change.  Vital Signs as repeated by /100 right 138/94 left  SPO2 95%  Patient is taking medication as prescribed  Patient is tolerating medications well.  Patient is not monitoring Blood Pressure at home.  Average readings if yes are NA  Current complaints: bilateral foot pain with ambulation distances  Disposition:  patient to continue with the same medication    [facility] preferred  Home phone: [phone] okay to leave detailed message    Tylenol added to medication list PRN    Ginette Akhtar RN on 11/1/2023 at 10:39 AM

## 2023-11-01 NOTE — TELEPHONE ENCOUNTER
Patient Contact     Attempt # 1     Was call answered?  No.  Left message on voicemail with information to call back.     Corrina Khan RN on 11/1/2023 at 4:03 PM

## 2023-11-01 NOTE — RESULT ENCOUNTER NOTE
Please send letter:    Sage Chairez    Your kidney function is stable. The hemoglobin is improved. Continue your iron and other supplements. Please let us know if you have any questions.     Take care,    RON Lopez CNP

## 2023-11-01 NOTE — LETTER
November 2, 2023      Mihaela Vance  [address] [address]        Dear ,    We are writing to inform you of your test results. Your kidney function is stable. The hemoglobin is improved. Continue your iron and other supplements. Please let us know if you have any questions.     Resulted Orders   Comprehensive metabolic panel (BMP + Alb, Alk Phos, ALT, AST, Total. Bili, TP)   Result Value Ref Range    Sodium 135 135 - 145 mmol/L      Comment:      Reference intervals for this test were updated on 09/26/2023 to more accurately reflect our healthy population. There may be differences in the flagging of prior results with similar values performed with this method. Interpretation of those prior results can be made in the context of the updated reference intervals.     Potassium 3.8 3.4 - 5.3 mmol/L    Carbon Dioxide (CO2) 23 22 - 29 mmol/L    Anion Gap 18 (H) 7 - 15 mmol/L    Urea Nitrogen 21.9 8.0 - 23.0 mg/dL    Creatinine 1.22 (H) 0.51 - 0.95 mg/dL    GFR Estimate 49 (L) >60 mL/min/1.73m2    Calcium 10.2 8.8 - 10.2 mg/dL    Chloride 94 (L) 98 - 107 mmol/L    Glucose 123 (H) 70 - 99 mg/dL    Alkaline Phosphatase 91 35 - 104 U/L    AST 31 0 - 45 U/L      Comment:      Reference intervals for this test were updated on 6/12/2023 to more accurately reflect our healthy population. There may be differences in the flagging of prior results with similar values performed with this method. Interpretation of those prior results can be made in the context of the updated reference intervals.    ALT 18 0 - 50 U/L      Comment:      Reference intervals for this test were updated on 6/12/2023 to more accurately reflect our healthy population. There may be differences in the flagging of prior results with similar values performed with this method. Interpretation of those prior results can be made in the context of the updated reference intervals.      Protein Total 8.5 (H) 6.4 - 8.3 g/dL    Albumin 4.8  3.5 - 5.2 g/dL    Bilirubin Total 0.9 <=1.2 mg/dL   CBC with platelets and differential   Result Value Ref Range    WBC Count 7.1 4.0 - 11.0 10e3/uL    RBC Count 3.69 (L) 3.80 - 5.20 10e6/uL    Hemoglobin 10.9 (L) 11.7 - 15.7 g/dL    Hematocrit 33.9 (L) 35.0 - 47.0 %    MCV 92 78 - 100 fL    MCH 29.5 26.5 - 33.0 pg    MCHC 32.2 31.5 - 36.5 g/dL    RDW 17.1 (H) 10.0 - 15.0 %    Platelet Count 348 150 - 450 10e3/uL    % Neutrophils 74 %    % Lymphocytes 14 %    % Monocytes 10 %    % Eosinophils 1 %    % Basophils 1 %    % Immature Granulocytes 0 %    Absolute Neutrophils 5.3 1.6 - 8.3 10e3/uL    Absolute Lymphocytes 1.0 0.8 - 5.3 10e3/uL    Absolute Monocytes 0.7 0.0 - 1.3 10e3/uL    Absolute Eosinophils 0.1 0.0 - 0.7 10e3/uL    Absolute Basophils 0.1 0.0 - 0.2 10e3/uL    Absolute Immature Granulocytes 0.0 <=0.4 10e3/uL       Sincerely,      RON Lora CNP

## 2023-11-01 NOTE — TELEPHONE ENCOUNTER
Mihaela Vance is a 63 year old year old patient who comes in today for a Blood Pressure check because of medication change.  Vital Signs as repeated by /100 right 138/94 left  SPO2 95%  Patient is taking medication as prescribed  Patient is tolerating medications well.  Patient is not monitoring Blood Pressure at home.  Average readings if yes are NA  Current complaints: bilateral foot pain with ambulation distances  Disposition:  patient to continue with the same medication    [facility] preferred  Home phone: [phone] okay to leave detailed message    Ginette Akhtar RN on 11/1/2023 at 10:39 AM

## 2024-01-01 ENCOUNTER — TELEPHONE (OUTPATIENT)
Dept: FAMILY MEDICINE | Facility: CLINIC | Age: 64
End: 2024-01-01
Payer: COMMERCIAL

## 2024-01-01 DIAGNOSIS — F41.9 ANXIETY: ICD-10-CM

## 2024-01-01 RX ORDER — HYDROXYZINE HYDROCHLORIDE 25 MG/1
TABLET, FILM COATED ORAL
Qty: 100 TABLET | Refills: 1 | Status: SHIPPED | OUTPATIENT
Start: 2024-01-01

## 2024-02-27 NOTE — TELEPHONE ENCOUNTER
CC: Annual check-up    SUBJECTIVE:   44 y.o. female   for annual routine Pap and checkup. Patient's last menstrual period was 02/15/2024..  She complains of heavy cycles evans 1st 2-3 days of monthly 7 day cycle. Was recently admitted and got 1 unit of blood. Here for f/u.        Past Medical History:   Diagnosis Date    Hypertension      Past Surgical History:   Procedure Laterality Date    COLONOSCOPY N/A 2024    Procedure: COLONOSCOPY;  Surgeon: Saeid Rain MD;  Location: [facility];  Service: Gastroenterology;  Laterality: N/A;    ESOPHAGOGASTRODUODENOSCOPY N/A 2024    Procedure: EGD (ESOPHAGOGASTRODUODENOSCOPY);  Surgeon: Bonifacio Toney MD;  Location: [facility];  Service: Endoscopy;  Laterality: N/A;     Social History     Socioeconomic History    Marital status:    Tobacco Use    Smoking status: Never     Passive exposure: Never    Smokeless tobacco: Never   Substance and Sexual Activity    Alcohol use: No    Drug use: No     Social Determinants of Health     Financial Resource Strain: Medium Risk (2024)    Overall Financial Resource Strain (CARDIA)     Difficulty of Paying Living Expenses: Somewhat hard   Food Insecurity: Food Insecurity Present (2024)    Hunger Vital Sign     Worried About Running Out of Food in the Last Year: Sometimes true     Ran Out of Food in the Last Year: Never true   Transportation Needs: No Transportation Needs (2024)    PRAPARE - Transportation     Lack of Transportation (Medical): No     Lack of Transportation (Non-Medical): No   Physical Activity: Inactive (2024)    Exercise Vital Sign     Days of Exercise per Week: 0 days     Minutes of Exercise per Session: 0 min   Stress: No Stress Concern Present (2024)    [employer] - Occupational Stress Questionnaire     Feeling of Stress : Only a little   Social Connections: Unknown (2024)    Social Connection and Isolation Panel [NHANES]     Pt would like the Omeprazole PA to be for the #120/yr.  Pt states she takes it 2 days / week.  Can the PA be done for this lesser amt?  Advise.  Marcy    Frequency of Communication with Friends and Family: More than three times a week     Frequency of Social Gatherings with Friends and Family: More than three times a week     Active Member of Clubs or Organizations: Yes     Attends Club or Organization Meetings: More than 4 times per year     Marital Status:    Housing Stability: High Risk (2024)    Housing Stability Vital Sign     Unable to Pay for Housing in the Last Year: Yes     Number of Places Lived in the Last Year: 1     Unstable Housing in the Last Year: No     Family History   Problem Relation Age of Onset    Hypertension Mother     Heart disease Father     Hypertension Father     Cancer Paternal Grandfather      OB History    Para Term  AB Living   2 2 2         SAB IAB Ectopic Multiple Live Births                  # Outcome Date GA Lbr Alejo/2nd Weight Sex Delivery Anes PTL Lv   2 Term            1 Term                  Current Outpatient Medications   Medication Sig Dispense Refill    amLODIPine (NORVASC) 5 MG tablet Take 1 tablet (5 mg total) by mouth once daily. 90 tablet 3    hydroCHLOROthiazide (HYDRODIURIL) 12.5 MG Tab Take 1 tablet (12.5 mg total) by mouth once daily. 90 tablet 3    pantoprazole (PROTONIX) 40 MG tablet Take 1 tablet (40 mg total) by mouth once daily. 90 tablet 3    ferrous sulfate (FEOSOL) 325 mg (65 mg iron) Tab tablet Take 1 tablet (325 mg total) by mouth daily with breakfast. 90 tablet 2    tranexamic acid (LYSTEDA) 650 mg tablet Take 2 tablets (1,300 mg total) by mouth 3 (three) times daily. 60 tablet 6     No current facility-administered medications for this visit.     Allergies: Ace inhibitors     ROS:  Constitutional: no weight loss, weight gain, fever, fatigue  Eyes:  No vision changes, glasses/contacts  ENT/Mouth: No ulcers, sinus problems, ears ringing, headache  Cardiovascular: No inability to lie flat, chest pain, exercise intolerance, swelling, heart palpitations  Respiratory: No wheezing,  coughing blood, shortness of breath, or cough  Gastrointestinal: No diarrhea, bloody stool, nausea/vomiting, constipation, gas, hemorrhoids  Genitourinary: No blood in urine, painful urination, urgency of urination, frequency of urination, incomplete emptying, incontinence, abnormal bleeding, painful periods, heavy periods, vaginal discharge, vaginal odor, painful intercourse, sexual problems, bleeding after intercourse.  Musculoskeletal: No muscle weakness  Skin/Breast: No painful breasts, nipple discharge, masses, rash, ulcers  Neurological: No passing out, seizures, numbness, headache  Endocrine: No diabetes, hypothyroid, hyperthyroid, hot flashes, hair loss, abnormal hair growth, ance  Psychiatric: No depression, crying  Hematologic: No bruises, bleeding, swollen lymph nodes, anemia.      OBJECTIVE:   The patient appears well, alert, oriented x 3, in no distress.  BP (!) 150/84   Wt 62.7 kg (138 lb 3.7 oz)   LMP 02/15/2024   BMI 23.00 kg/m²   NECK: no thyromegaly, trachea midline  SKIN: no acne, striae, hirsutism  BREAST EXAM: breasts appear normal, no suspicious masses, no skin or nipple changes or axillary nodes, symmetric fibrous changes in both upper outer quadrants  ABDOMEN: no hernias, masses, or hepatosplenomegaly  GENITALIA: normal external genitalia, no erythema, no discharge  URETHRA: normal urethra, normal urethral meatus  VAGINA: vaginal discharge copious, white, and malodorous  CERVIX: no lesions or cervical motion tenderness  UTERUS: enlarged, 16-18w weeks size and irregular popst fibroid with no CDS room  ADNEXA: normal adnexa      ASSESSMENT:   well woman  1. Well woman exam with routine gynecological exam    2. Dysmenorrhea    3. Fibroids    4. Anemia due to blood loss    5. Iron deficiency anemia, unspecified iron deficiency anemia type        PLAN:   Mammogram schedule d kaylyn  pap smear  return annually or prn  Orders Placed This Encounter    US Pelvis Complete Non OB    Liquid-Based Pap  Smear, Screening    tranexamic acid (LYSTEDA) 650 mg tablet    ferrous sulfate (FEOSOL) 325 mg (65 mg iron) Tab tablet   Will f/u in 2-3 months after trial of Lysteda  Po iron, track and monitor cycles and vb  Do IV iron if approved already ordered

## 2024-04-12 NOTE — TELEPHONE ENCOUNTER
Patient Quality Outreach    Patient is due for the following:   Breast Cancer Screening - Mammogram  Physical Annual Wellness Visit    Next Steps:   Schedule a Annual Wellness Visit    Type of outreach:    Sent letter.    Next Steps:  Reach out within 90 days via Letter.    Max number of attempts reached: No. Will try again in 90 days if patient still on fail list.    Questions for provider review:    None           Brittaney Garza, [facility]

## 2024-04-12 NOTE — LETTER
April 12, 2024    To  Mihaela Vance  [address] [facility] [address]    Your team at [facility] cares about your health. We have reviewed your chart and based on our findings; we are making the following recommendations to better manage your health.     You are in particular need of attention regarding the following:     Schedule Annual MAMMOGRAPHY. [facility] scheduling number is [phone] or schedule in [facility] (self referral).  1 in 8 women will develop invasive breast cancer during her lifetime and it is the most common non-skin cancer in [place] Women. EARLY detection, new treatments, and a better understanding of the disease have increased survival rates- the 5 year survival rate in the 1960's was 63% and today it is close to 90%.  PREVENTATIVE VISIT: Annual Medicare Wellness:Schedule an Annual Medicare Wellness Exam. Please call your [facility] [place] clinic to set up your appointment.    If you have already completed these items, please contact the clinic via phone or   [facility] so your care team can review and update your records. Thank you for   choosing [facility] for your healthcare needs. For any questions,   concerns, or to schedule an appointment please contact our clinic.    Healthy Regards,      Your [facility] Care Team

## (undated) RX ORDER — ONDANSETRON 2 MG/ML
INJECTION INTRAMUSCULAR; INTRAVENOUS
Status: DISPENSED
Start: 2023-01-01

## (undated) RX ORDER — LIDOCAINE HYDROCHLORIDE 10 MG/ML
INJECTION, SOLUTION EPIDURAL; INFILTRATION; INTRACAUDAL; PERINEURAL
Status: DISPENSED
Start: 2019-06-19

## (undated) RX ORDER — GLYCOPYRROLATE 0.2 MG/ML
INJECTION, SOLUTION INTRAMUSCULAR; INTRAVENOUS
Status: DISPENSED
Start: 2023-01-01

## (undated) RX ORDER — GLYCOPYRROLATE 0.2 MG/ML
INJECTION, SOLUTION INTRAMUSCULAR; INTRAVENOUS
Status: DISPENSED
Start: 2019-06-19

## (undated) RX ORDER — PROPOFOL 10 MG/ML
INJECTION, EMULSION INTRAVENOUS
Status: DISPENSED
Start: 2019-04-24

## (undated) RX ORDER — LIDOCAINE HYDROCHLORIDE 10 MG/ML
INJECTION, SOLUTION EPIDURAL; INFILTRATION; INTRACAUDAL; PERINEURAL
Status: DISPENSED
Start: 2023-01-01

## (undated) RX ORDER — FENTANYL CITRATE-0.9 % NACL/PF 10 MCG/ML
PLASTIC BAG, INJECTION (ML) INTRAVENOUS
Status: DISPENSED
Start: 2023-01-01